# Patient Record
Sex: FEMALE | Race: WHITE | NOT HISPANIC OR LATINO | Employment: OTHER | URBAN - METROPOLITAN AREA
[De-identification: names, ages, dates, MRNs, and addresses within clinical notes are randomized per-mention and may not be internally consistent; named-entity substitution may affect disease eponyms.]

---

## 2023-03-17 LAB
CHOLESTEROL (MG/DL) IN SER/PLAS: 107 MG/DL (ref 0–199)
CHOLESTEROL IN HDL (MG/DL) IN SER/PLAS: 48.9 MG/DL
CHOLESTEROL/HDL RATIO: 2.2
LDL: 43 MG/DL (ref 0–99)
TRIGLYCERIDE (MG/DL) IN SER/PLAS: 75 MG/DL (ref 0–149)
VLDL: 15 MG/DL (ref 0–40)

## 2023-03-19 LAB
ESTIMATED AVERAGE GLUCOSE FOR HBA1C: 123 MG/DL
HEMOGLOBIN A1C/HEMOGLOBIN TOTAL IN BLOOD: 5.9 %

## 2023-04-25 LAB
ALANINE AMINOTRANSFERASE (SGPT) (U/L) IN SER/PLAS: 21 U/L (ref 7–45)
ALBUMIN (G/DL) IN SER/PLAS: 3.9 G/DL (ref 3.4–5)
ALKALINE PHOSPHATASE (U/L) IN SER/PLAS: 77 U/L (ref 33–136)
ANION GAP IN SER/PLAS: 9 MMOL/L (ref 10–20)
ASPARTATE AMINOTRANSFERASE (SGOT) (U/L) IN SER/PLAS: 24 U/L (ref 9–39)
BILIRUBIN TOTAL (MG/DL) IN SER/PLAS: 0.6 MG/DL (ref 0–1.2)
CALCIDIOL (25 OH VITAMIN D3) (NG/ML) IN SER/PLAS: 45 NG/ML
CALCIUM (MG/DL) IN SER/PLAS: 9.6 MG/DL (ref 8.6–10.3)
CARBON DIOXIDE, TOTAL (MMOL/L) IN SER/PLAS: 30 MMOL/L (ref 21–32)
CHLORIDE (MMOL/L) IN SER/PLAS: 106 MMOL/L (ref 98–107)
CREATININE (MG/DL) IN SER/PLAS: 0.81 MG/DL (ref 0.5–1.05)
ESTIMATED AVERAGE GLUCOSE FOR HBA1C: 114 MG/DL
GFR FEMALE: 75 ML/MIN/1.73M2
GLUCOSE (MG/DL) IN SER/PLAS: 88 MG/DL (ref 74–99)
HEMOGLOBIN A1C/HEMOGLOBIN TOTAL IN BLOOD: 5.6 %
POTASSIUM (MMOL/L) IN SER/PLAS: 4.3 MMOL/L (ref 3.5–5.3)
PROTEIN TOTAL: 6.9 G/DL (ref 6.4–8.2)
SODIUM (MMOL/L) IN SER/PLAS: 141 MMOL/L (ref 136–145)
THYROTROPIN (MIU/L) IN SER/PLAS BY DETECTION LIMIT <= 0.05 MIU/L: 1.59 MIU/L (ref 0.44–3.98)
UREA NITROGEN (MG/DL) IN SER/PLAS: 16 MG/DL (ref 6–23)

## 2023-09-06 PROBLEM — M19.90 ARTHRITIS: Status: ACTIVE | Noted: 2023-09-06

## 2023-09-06 PROBLEM — R25.2 LEG CRAMPS: Status: ACTIVE | Noted: 2023-09-06

## 2023-09-06 PROBLEM — R07.89 CHEST PAIN, ATYPICAL: Status: ACTIVE | Noted: 2023-09-06

## 2023-09-06 PROBLEM — R94.31 ABNORMAL EKG: Status: ACTIVE | Noted: 2023-09-06

## 2023-09-06 PROBLEM — M48.00 SPINAL STENOSIS: Status: ACTIVE | Noted: 2023-09-06

## 2023-09-06 PROBLEM — R53.83 FATIGUE: Status: ACTIVE | Noted: 2023-09-06

## 2023-09-06 PROBLEM — I25.10 CORONARY ARTERY DISEASE: Status: ACTIVE | Noted: 2023-09-06

## 2023-09-06 PROBLEM — I10 HYPERTENSION, ESSENTIAL, BENIGN: Status: ACTIVE | Noted: 2023-09-06

## 2023-09-06 PROBLEM — I20.9 ACUTE ANGINA (CMS-HCC): Status: ACTIVE | Noted: 2023-09-06

## 2023-09-06 PROBLEM — I48.0 PAROXYSMAL ATRIAL FIBRILLATION (MULTI): Status: ACTIVE | Noted: 2023-09-06

## 2023-09-06 PROBLEM — E66.3 OVERWEIGHT (BMI 25.0-29.9): Status: ACTIVE | Noted: 2023-09-06

## 2023-09-06 PROBLEM — R00.2 PALPITATIONS: Status: ACTIVE | Noted: 2023-09-06

## 2023-09-06 PROBLEM — E03.9 HYPOTHYROIDISM: Status: ACTIVE | Noted: 2023-09-06

## 2023-09-06 PROBLEM — R06.09 EXERTIONAL DYSPNEA: Status: ACTIVE | Noted: 2023-09-06

## 2023-09-06 PROBLEM — M85.80 OSTEOPENIA: Status: ACTIVE | Noted: 2023-09-06

## 2023-09-06 PROBLEM — H53.9 VISUAL CHANGES: Status: ACTIVE | Noted: 2023-09-06

## 2023-09-06 PROBLEM — Z95.1 S/P CABG X 4: Status: ACTIVE | Noted: 2023-09-06

## 2023-09-06 PROBLEM — H53.453: Status: ACTIVE | Noted: 2023-09-06

## 2023-09-06 PROBLEM — E78.5 HYPERLIPIDEMIA: Status: ACTIVE | Noted: 2023-09-06

## 2023-09-06 PROBLEM — M54.16 LUMBAR RADICULOPATHY, CHRONIC: Status: ACTIVE | Noted: 2023-09-06

## 2023-09-06 PROBLEM — I25.2: Status: ACTIVE | Noted: 2023-09-06

## 2023-09-06 RX ORDER — NITROFURANTOIN 25; 75 MG/1; MG/1
1 CAPSULE ORAL 2 TIMES DAILY
COMMUNITY
Start: 2020-12-30 | End: 2023-10-11 | Stop reason: WASHOUT

## 2023-09-06 RX ORDER — ALPRAZOLAM 0.25 MG/1
0.25 TABLET ORAL
COMMUNITY

## 2023-09-06 RX ORDER — PRIMIDONE 50 MG/1
0.5 TABLET ORAL DAILY
COMMUNITY
End: 2023-10-11 | Stop reason: WASHOUT

## 2023-09-06 RX ORDER — NITROGLYCERIN 0.3 MG/1
0.3 TABLET SUBLINGUAL
COMMUNITY

## 2023-09-06 RX ORDER — FAMOTIDINE 40 MG/1
1 TABLET, FILM COATED ORAL 2 TIMES DAILY
COMMUNITY
Start: 2019-10-16

## 2023-09-06 RX ORDER — CALCIUM CARBONATE 600 MG
2 TABLET ORAL DAILY
COMMUNITY
End: 2023-10-11 | Stop reason: WASHOUT

## 2023-09-06 RX ORDER — OLMESARTAN MEDOXOMIL 40 MG/1
1 TABLET ORAL DAILY
COMMUNITY
Start: 2020-02-26 | End: 2024-02-05 | Stop reason: SDUPTHER

## 2023-09-06 RX ORDER — NAPROXEN SODIUM 220 MG/1
81 TABLET, FILM COATED ORAL
COMMUNITY
Start: 2019-10-04

## 2023-09-06 RX ORDER — CELECOXIB 200 MG/1
200 CAPSULE ORAL DAILY PRN
COMMUNITY
Start: 2020-09-02 | End: 2023-10-11 | Stop reason: WASHOUT

## 2023-09-06 RX ORDER — ERGOCALCIFEROL 1.25 MG/1
50000 CAPSULE ORAL
COMMUNITY
Start: 2018-04-25

## 2023-09-06 RX ORDER — METOPROLOL SUCCINATE 50 MG/1
1.5 TABLET, EXTENDED RELEASE ORAL 2 TIMES DAILY
COMMUNITY
Start: 2021-03-11

## 2023-09-06 RX ORDER — ACETAMINOPHEN 325 MG/1
325 TABLET ORAL
COMMUNITY

## 2023-09-06 RX ORDER — VIT C/E/ZN/COPPR/LUTEIN/ZEAXAN 250MG-90MG
1 CAPSULE ORAL DAILY
COMMUNITY
End: 2023-10-11 | Stop reason: WASHOUT

## 2023-09-06 RX ORDER — LEVOTHYROXINE SODIUM 75 UG/1
75 TABLET ORAL
COMMUNITY
Start: 2018-05-02

## 2023-09-06 RX ORDER — TRIAMCINOLONE ACETONIDE 1 MG/G
1 OINTMENT TOPICAL 2 TIMES DAILY
COMMUNITY
Start: 2020-07-23

## 2023-09-06 RX ORDER — IRON POLYSACCHARIDE COMPLEX 150 MG
150 CAPSULE ORAL DAILY
COMMUNITY
Start: 2019-09-30 | End: 2023-10-11 | Stop reason: WASHOUT

## 2023-09-06 RX ORDER — EVOLOCUMAB 140 MG/ML
140 INJECTION, SOLUTION SUBCUTANEOUS
COMMUNITY
Start: 2019-10-16

## 2023-10-11 ENCOUNTER — OFFICE VISIT (OUTPATIENT)
Dept: CARDIOLOGY | Facility: CLINIC | Age: 78
End: 2023-10-11
Payer: MEDICARE

## 2023-10-11 VITALS
HEIGHT: 64 IN | BODY MASS INDEX: 29.02 KG/M2 | DIASTOLIC BLOOD PRESSURE: 64 MMHG | HEART RATE: 61 BPM | SYSTOLIC BLOOD PRESSURE: 138 MMHG | WEIGHT: 170 LBS

## 2023-10-11 DIAGNOSIS — I25.10 CORONARY ARTERY DISEASE INVOLVING NATIVE CORONARY ARTERY OF NATIVE HEART WITHOUT ANGINA PECTORIS: Primary | ICD-10-CM

## 2023-10-11 DIAGNOSIS — I10 HYPERTENSION, ESSENTIAL, BENIGN: ICD-10-CM

## 2023-10-11 DIAGNOSIS — E78.00 PURE HYPERCHOLESTEROLEMIA: ICD-10-CM

## 2023-10-11 DIAGNOSIS — Z95.1 S/P CABG X 4: ICD-10-CM

## 2023-10-11 PROCEDURE — 99214 OFFICE O/P EST MOD 30 MIN: CPT | Performed by: INTERNAL MEDICINE

## 2023-10-11 PROCEDURE — 1159F MED LIST DOCD IN RCRD: CPT | Performed by: INTERNAL MEDICINE

## 2023-10-11 PROCEDURE — 1125F AMNT PAIN NOTED PAIN PRSNT: CPT | Performed by: INTERNAL MEDICINE

## 2023-10-11 PROCEDURE — 3078F DIAST BP <80 MM HG: CPT | Performed by: INTERNAL MEDICINE

## 2023-10-11 PROCEDURE — 93000 ELECTROCARDIOGRAM COMPLETE: CPT | Performed by: INTERNAL MEDICINE

## 2023-10-11 PROCEDURE — 1160F RVW MEDS BY RX/DR IN RCRD: CPT | Performed by: INTERNAL MEDICINE

## 2023-10-11 PROCEDURE — 3075F SYST BP GE 130 - 139MM HG: CPT | Performed by: INTERNAL MEDICINE

## 2023-10-11 PROCEDURE — 1036F TOBACCO NON-USER: CPT | Performed by: INTERNAL MEDICINE

## 2023-10-11 RX ORDER — DOXAZOSIN 2 MG/1
2 TABLET ORAL NIGHTLY
COMMUNITY
Start: 2023-06-29

## 2023-10-11 NOTE — ASSESSMENT & PLAN NOTE
9/25/19 LIMA to LAD, SVG to diagonal, SVG sequential to PDA and PLB ([facility] at [facility])

## 2023-10-11 NOTE — PROGRESS NOTES
Transitory subxiphoid discomfort, got gout , No dyspnea,, no palapitions, no edema.      Review of Systems   All other systems reviewed and are negative.       Physical Exam  HENT:      Head: Normocephalic.   Eyes:      Pupils: Pupils are equal, round, and reactive to light.   Cardiovascular:      Rate and Rhythm: Normal rate and regular rhythm.   Abdominal:      Palpations: Abdomen is soft.   Musculoskeletal:      Right lower leg: No edema.      Left lower leg: No edema.   Neurological:      Mental Status: She is alert.          Problem List Items Addressed This Visit          Cardiac and Vasculature    Coronary artery disease - Primary    Hyperlipidemia    Hypertension, essential, benign    S/P CABG x 4    Current Assessment & Plan     9/25/19 LIMA to LAD, SVG to diagonal, SVG sequential to PDA and PLB ([facility] at [facility])

## 2023-10-24 ENCOUNTER — ANCILLARY PROCEDURE (OUTPATIENT)
Dept: RADIOLOGY | Facility: CLINIC | Age: 78
End: 2023-10-24
Payer: MEDICARE

## 2023-10-24 ENCOUNTER — LAB (OUTPATIENT)
Dept: LAB | Facility: LAB | Age: 78
End: 2023-10-24
Payer: MEDICARE

## 2023-10-24 DIAGNOSIS — I25.10 CORONARY ARTERY DISEASE INVOLVING NATIVE CORONARY ARTERY OF NATIVE HEART WITHOUT ANGINA PECTORIS: ICD-10-CM

## 2023-10-24 DIAGNOSIS — R69 ILLNESS, UNSPECIFIED: Primary | ICD-10-CM

## 2023-10-24 DIAGNOSIS — Z12.31 ENCOUNTER FOR SCREENING MAMMOGRAM FOR MALIGNANT NEOPLASM OF BREAST: ICD-10-CM

## 2023-10-24 DIAGNOSIS — E78.00 PURE HYPERCHOLESTEROLEMIA: ICD-10-CM

## 2023-10-24 LAB
ALBUMIN SERPL BCP-MCNC: 4.2 G/DL (ref 3.4–5)
ALP SERPL-CCNC: 78 U/L (ref 33–136)
ALT SERPL W P-5'-P-CCNC: 16 U/L (ref 7–45)
ANION GAP SERPL CALC-SCNC: 11 MMOL/L (ref 10–20)
AST SERPL W P-5'-P-CCNC: 19 U/L (ref 9–39)
BILIRUB SERPL-MCNC: 0.6 MG/DL (ref 0–1.2)
BUN SERPL-MCNC: 20 MG/DL (ref 6–23)
CALCIUM SERPL-MCNC: 10.1 MG/DL (ref 8.6–10.3)
CHLORIDE SERPL-SCNC: 104 MMOL/L (ref 98–107)
CHOLEST SERPL-MCNC: 148 MG/DL (ref 0–199)
CHOLESTEROL/HDL RATIO: 2.6
CO2 SERPL-SCNC: 28 MMOL/L (ref 21–32)
CREAT SERPL-MCNC: 0.95 MG/DL (ref 0.5–1.05)
GFR SERPL CREATININE-BSD FRML MDRD: 62 ML/MIN/1.73M*2
GLUCOSE SERPL-MCNC: 80 MG/DL (ref 74–99)
HDLC SERPL-MCNC: 57.7 MG/DL
LDLC SERPL CALC-MCNC: 68 MG/DL
NON HDL CHOLESTEROL: 90 MG/DL (ref 0–149)
POTASSIUM SERPL-SCNC: 4.4 MMOL/L (ref 3.5–5.3)
PROT SERPL-MCNC: 6.8 G/DL (ref 6.4–8.2)
SODIUM SERPL-SCNC: 139 MMOL/L (ref 136–145)
TRIGL SERPL-MCNC: 112 MG/DL (ref 0–149)
URATE SERPL-MCNC: 4.9 MG/DL (ref 2.3–6.7)
VLDL: 22 MG/DL (ref 0–40)

## 2023-10-24 PROCEDURE — 77067 SCR MAMMO BI INCL CAD: CPT | Performed by: RADIOLOGY

## 2023-10-24 PROCEDURE — 84550 ASSAY OF BLOOD/URIC ACID: CPT

## 2023-10-24 PROCEDURE — 77067 SCR MAMMO BI INCL CAD: CPT

## 2023-10-24 PROCEDURE — 36415 COLL VENOUS BLD VENIPUNCTURE: CPT

## 2023-10-24 PROCEDURE — 77063 BREAST TOMOSYNTHESIS BI: CPT | Performed by: RADIOLOGY

## 2023-10-24 PROCEDURE — 80061 LIPID PANEL: CPT

## 2023-10-24 PROCEDURE — 80053 COMPREHEN METABOLIC PANEL: CPT

## 2023-11-02 ENCOUNTER — ANCILLARY PROCEDURE (OUTPATIENT)
Dept: RADIOLOGY | Facility: CLINIC | Age: 78
End: 2023-11-02
Payer: MEDICARE

## 2023-11-02 ENCOUNTER — LAB (OUTPATIENT)
Dept: LAB | Facility: LAB | Age: 78
End: 2023-11-02
Payer: MEDICARE

## 2023-11-02 ENCOUNTER — APPOINTMENT (OUTPATIENT)
Dept: RADIOLOGY | Facility: CLINIC | Age: 78
End: 2023-11-02
Payer: MEDICARE

## 2023-11-02 DIAGNOSIS — E55.9 VITAMIN D DEFICIENCY, UNSPECIFIED: ICD-10-CM

## 2023-11-02 DIAGNOSIS — E04.2 NONTOXIC MULTINODULAR GOITER: ICD-10-CM

## 2023-11-02 DIAGNOSIS — E11.9 TYPE 2 DIABETES MELLITUS WITHOUT COMPLICATIONS (MULTI): ICD-10-CM

## 2023-11-02 DIAGNOSIS — E03.9 HYPOTHYROIDISM, UNSPECIFIED: Primary | ICD-10-CM

## 2023-11-02 DIAGNOSIS — R93.89 ABNORMAL FINDINGS ON DIAGNOSTIC IMAGING OF OTHER SPECIFIED BODY STRUCTURES: ICD-10-CM

## 2023-11-02 LAB
25(OH)D3 SERPL-MCNC: 35 NG/ML (ref 30–100)
ALBUMIN SERPL BCP-MCNC: 3.9 G/DL (ref 3.4–5)
ALP SERPL-CCNC: 69 U/L (ref 33–136)
ALT SERPL W P-5'-P-CCNC: 15 U/L (ref 7–45)
ANION GAP SERPL CALC-SCNC: 10 MMOL/L (ref 10–20)
AST SERPL W P-5'-P-CCNC: 21 U/L (ref 9–39)
BILIRUB SERPL-MCNC: 0.9 MG/DL (ref 0–1.2)
BUN SERPL-MCNC: 13 MG/DL (ref 6–23)
CALCIUM SERPL-MCNC: 9.4 MG/DL (ref 8.6–10.3)
CHLORIDE SERPL-SCNC: 105 MMOL/L (ref 98–107)
CO2 SERPL-SCNC: 30 MMOL/L (ref 21–32)
CREAT SERPL-MCNC: 0.75 MG/DL (ref 0.5–1.05)
CREAT UR-MCNC: 94 MG/DL (ref 20–320)
GFR SERPL CREATININE-BSD FRML MDRD: 83 ML/MIN/1.73M*2
GLUCOSE SERPL-MCNC: 78 MG/DL (ref 74–99)
MICROALBUMIN UR-MCNC: 9.4 MG/L
MICROALBUMIN/CREAT UR: 10 UG/MG CREAT
POTASSIUM SERPL-SCNC: 4.6 MMOL/L (ref 3.5–5.3)
PROT SERPL-MCNC: 6.5 G/DL (ref 6.4–8.2)
SODIUM SERPL-SCNC: 140 MMOL/L (ref 136–145)
T4 FREE SERPL-MCNC: 1.01 NG/DL (ref 0.61–1.12)
TSH SERPL-ACNC: 1.05 MIU/L (ref 0.44–3.98)

## 2023-11-02 PROCEDURE — 84443 ASSAY THYROID STIM HORMONE: CPT

## 2023-11-02 PROCEDURE — 83036 HEMOGLOBIN GLYCOSYLATED A1C: CPT

## 2023-11-02 PROCEDURE — 84439 ASSAY OF FREE THYROXINE: CPT

## 2023-11-02 PROCEDURE — 82043 UR ALBUMIN QUANTITATIVE: CPT

## 2023-11-02 PROCEDURE — 76536 US EXAM OF HEAD AND NECK: CPT | Mod: LIO | Performed by: RADIOLOGY

## 2023-11-02 PROCEDURE — 82306 VITAMIN D 25 HYDROXY: CPT

## 2023-11-02 PROCEDURE — 36415 COLL VENOUS BLD VENIPUNCTURE: CPT

## 2023-11-02 PROCEDURE — 82570 ASSAY OF URINE CREATININE: CPT

## 2023-11-02 PROCEDURE — 80053 COMPREHEN METABOLIC PANEL: CPT

## 2023-11-02 PROCEDURE — 76536 US EXAM OF HEAD AND NECK: CPT | Mod: LIO

## 2023-11-03 LAB
EST. AVERAGE GLUCOSE BLD GHB EST-MCNC: 126 MG/DL
HBA1C MFR BLD: 6 %

## 2024-01-10 ENCOUNTER — HOSPITAL ENCOUNTER (OUTPATIENT)
Dept: RADIOLOGY | Facility: HOSPITAL | Age: 79
Discharge: HOME | End: 2024-01-10
Payer: MEDICARE

## 2024-01-10 DIAGNOSIS — R52 PAIN: ICD-10-CM

## 2024-01-10 PROBLEM — N39.3 STRESS INCONTINENCE OF URINE: Status: ACTIVE | Noted: 2023-12-13

## 2024-01-10 PROBLEM — K21.9 GERD (GASTROESOPHAGEAL REFLUX DISEASE): Status: ACTIVE | Noted: 2019-03-11

## 2024-01-10 PROBLEM — I50.30 DIASTOLIC HF (HEART FAILURE) (MULTI): Status: ACTIVE | Noted: 2019-05-28

## 2024-01-10 PROCEDURE — 73560 X-RAY EXAM OF KNEE 1 OR 2: CPT | Mod: RT

## 2024-01-10 PROCEDURE — 73560 X-RAY EXAM OF KNEE 1 OR 2: CPT | Mod: RIGHT SIDE | Performed by: RADIOLOGY

## 2024-01-17 ENCOUNTER — APPOINTMENT (OUTPATIENT)
Dept: CARDIOLOGY | Facility: CLINIC | Age: 79
End: 2024-01-17
Payer: MEDICARE

## 2024-01-18 ENCOUNTER — OFFICE VISIT (OUTPATIENT)
Dept: CARDIOLOGY | Facility: CLINIC | Age: 79
End: 2024-01-18
Payer: MEDICARE

## 2024-01-18 VITALS
SYSTOLIC BLOOD PRESSURE: 150 MMHG | DIASTOLIC BLOOD PRESSURE: 90 MMHG | HEIGHT: 64 IN | BODY MASS INDEX: 28.27 KG/M2 | HEART RATE: 88 BPM | WEIGHT: 165.6 LBS

## 2024-01-18 DIAGNOSIS — Z95.1 S/P CABG X 4: Primary | ICD-10-CM

## 2024-01-18 DIAGNOSIS — E78.00 PURE HYPERCHOLESTEROLEMIA: ICD-10-CM

## 2024-01-18 DIAGNOSIS — E03.9 HYPOTHYROIDISM, UNSPECIFIED TYPE: ICD-10-CM

## 2024-01-18 DIAGNOSIS — R60.0 EDEMA OF RIGHT LOWER EXTREMITY: ICD-10-CM

## 2024-01-18 DIAGNOSIS — I50.32 CHRONIC DIASTOLIC HEART FAILURE (MULTI): ICD-10-CM

## 2024-01-18 DIAGNOSIS — I25.10 CORONARY ARTERY DISEASE INVOLVING NATIVE CORONARY ARTERY OF NATIVE HEART WITHOUT ANGINA PECTORIS: ICD-10-CM

## 2024-01-18 PROCEDURE — 1159F MED LIST DOCD IN RCRD: CPT | Performed by: INTERNAL MEDICINE

## 2024-01-18 PROCEDURE — 1036F TOBACCO NON-USER: CPT | Performed by: INTERNAL MEDICINE

## 2024-01-18 PROCEDURE — 1160F RVW MEDS BY RX/DR IN RCRD: CPT | Performed by: INTERNAL MEDICINE

## 2024-01-18 PROCEDURE — 99215 OFFICE O/P EST HI 40 MIN: CPT | Performed by: INTERNAL MEDICINE

## 2024-01-18 PROCEDURE — 1125F AMNT PAIN NOTED PAIN PRSNT: CPT | Performed by: INTERNAL MEDICINE

## 2024-01-18 PROCEDURE — 3080F DIAST BP >= 90 MM HG: CPT | Performed by: INTERNAL MEDICINE

## 2024-01-18 PROCEDURE — 93000 ELECTROCARDIOGRAM COMPLETE: CPT | Performed by: INTERNAL MEDICINE

## 2024-01-18 PROCEDURE — 3077F SYST BP >= 140 MM HG: CPT | Performed by: INTERNAL MEDICINE

## 2024-01-18 NOTE — ASSESSMENT & PLAN NOTE
9/25/19 LIMA to Lad, SVG to diag., SVG sequential to PDA and PLB ([facility]) at [facility]

## 2024-01-18 NOTE — PROGRESS NOTES
"  Subjective  Kristin Martines  is a 78 y.o. year old female who presents for ASHD.  No chest pain, no palapitions, no dyspnea, notes pain in rightn knee from hmopving her knee cap out of place    Blood pressure 150/90, pulse 88, height 1.626 m (5' 4\"), weight 75.1 kg (165 lb 9.6 oz).   Amoxicillin-pot clavulanate, Cephalexin, Ciprofloxacin, Codeine, Erythromycin base, Esomeprazole magnesium, Ezetimibe, Oxycodone-acetaminophen, and Sulfamethoxazole-trimethoprim  Past Medical History:   Diagnosis Date    Encounter for screening for malignant neoplasm of vagina     Vaginal Pap smear    Other specified noninflammatory disorders of vulva and perineum     Vulval lesion    Personal history of other medical treatment 11/01/2019    H/O bone density study    Personal history of other medical treatment     H/O mammogram     Past Surgical History:   Procedure Laterality Date    FOOT SURGERY  12/02/2019    Foot Surgery    OTHER SURGICAL HISTORY  08/09/2021    Cardiac catheterization    OTHER SURGICAL HISTORY  04/29/2020    Coronary artery bypass graft    OTHER SURGICAL HISTORY  12/02/2019    Wrist Surgery    OTHER SURGICAL HISTORY  12/02/2019    Biopsy Vulvar     Family History   Problem Relation Name Age of Onset    Hypertension Mother      Heart attack Father      Asthma Other family hx     Other (cardiac disorder) Other family hx     Hypertension Other family hx      @SOC    Current Outpatient Medications   Medication Sig Dispense Refill    acetaminophen (Tylenol) 325 mg tablet Take 1 tablet (325 mg) by mouth.      ALPRAZolam (Xanax) 0.25 mg tablet Take 1 tablet (0.25 mg) by mouth.      aspirin 81 mg chewable tablet Chew 1 tablet (81 mg).      doxazosin (Cardura) 2 mg tablet Take 1 tablet (2 mg) by mouth once daily at bedtime.      ergocalciferol (Vitamin D-2) 1.25 MG (67843 UT) capsule Take 1 capsule (50,000 Units) by mouth.      evolocumab (Repatha SureClick) 140 mg/mL injection Inject 1 mL (140 mg) under the skin every 14 " (fourteen) days.      famotidine (Pepcid) 40 mg tablet Take 1 tablet (40 mg) by mouth 2 times a day.      levothyroxine (Synthroid, Levoxyl) 75 mcg tablet Take 1 tablet (75 mcg) by mouth.      metoprolol succinate XL (Toprol-XL) 50 mg 24 hr tablet Take 1.5 tablets (75 mg) by mouth twice a day.      multivitamin (MULTIPLE VITAMINS ORAL) Take 1 tablet by mouth once daily.      nitroglycerin (Nitrostat) 0.3 mg SL tablet Place 1 tablet (0.3 mg) under the tongue.      olmesartan (BENIcar) 40 mg tablet Take 1 tablet (40 mg) by mouth once daily.      triamcinolone (Kenalog) 0.1 % ointment Apply 1 Application topically twice a day. Apply sparingly and rub in well to affected area(s) twice daily. Not for face, breast or groin.       No current facility-administered medications for this visit.        ROS  Review of Systems   All other systems reviewed and are negative.      Physical Exam  Physical Exam  Constitutional:       Appearance: Normal appearance.   HENT:      Head: Normocephalic and atraumatic.   Eyes:      Extraocular Movements: Extraocular movements intact.      Pupils: Pupils are equal, round, and reactive to light.   Cardiovascular:      Heart sounds: S1 normal and S2 normal.   Pulmonary:      Effort: Pulmonary effort is normal.      Breath sounds: Normal breath sounds.   Abdominal:      Palpations: Abdomen is soft.   Musculoskeletal:      Right lower leg: No edema.      Left lower leg: No edema.      Comments: Slight edema of the right lower thigh   Skin:     General: Skin is warm and dry.   Neurological:      Mental Status: She is alert and oriented to person, place, and time.   Psychiatric:         Mood and Affect: Mood normal.         Behavior: Behavior normal.          EKG  Encounter Date: 10/11/23   ECG 12 Lead    Narrative    NSR at 61/min., WNL       Problem List Items Addressed This Visit       Coronary artery disease    Hyperlipidemia     10/24/23 Tchol = 148, HDL = 57.7, LDL = 68, VLDL = 22, Trig  =112         Hypothyroidism    S/P CABG x 4 - Primary     9/25/19 TOMPKINS to Lad, SVG to diag., SVG sequential to PDA and PLB ([facility]) at [facility]         Relevant Orders    ECG 12 Lead    Diastolic HF (heart failure) (CMS/HCC)     Other Visit Diagnoses       Edema of right lower extremity        Relevant Orders    Vascular US lower extremity venous duplex bilateral              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

## 2024-01-25 ENCOUNTER — HOSPITAL ENCOUNTER (OUTPATIENT)
Dept: RADIOLOGY | Facility: CLINIC | Age: 79
Discharge: HOME | End: 2024-01-25
Payer: MEDICARE

## 2024-01-25 ENCOUNTER — HOSPITAL ENCOUNTER (OUTPATIENT)
Dept: VASCULAR MEDICINE | Facility: HOSPITAL | Age: 79
Discharge: HOME | End: 2024-01-25
Payer: MEDICARE

## 2024-01-25 DIAGNOSIS — R52 PAIN: ICD-10-CM

## 2024-01-25 DIAGNOSIS — R60.9 SWELLING: ICD-10-CM

## 2024-01-25 DIAGNOSIS — R60.0 EDEMA OF RIGHT LOWER EXTREMITY: ICD-10-CM

## 2024-01-25 PROCEDURE — 73721 MRI JNT OF LWR EXTRE W/O DYE: CPT | Mod: RT

## 2024-01-25 PROCEDURE — 93970 EXTREMITY STUDY: CPT

## 2024-01-25 PROCEDURE — 93970 EXTREMITY STUDY: CPT | Performed by: INTERNAL MEDICINE

## 2024-01-25 PROCEDURE — 73721 MRI JNT OF LWR EXTRE W/O DYE: CPT | Mod: RIGHT SIDE | Performed by: RADIOLOGY

## 2024-01-30 ENCOUNTER — APPOINTMENT (OUTPATIENT)
Dept: VASCULAR MEDICINE | Facility: CLINIC | Age: 79
End: 2024-01-30
Payer: MEDICARE

## 2024-02-05 DIAGNOSIS — I10 HYPERTENSION, ESSENTIAL, BENIGN: Primary | ICD-10-CM

## 2024-02-05 RX ORDER — OLMESARTAN MEDOXOMIL 40 MG/1
40 TABLET ORAL DAILY
Qty: 90 TABLET | Refills: 3 | Status: SHIPPED | OUTPATIENT
Start: 2024-02-05

## 2024-04-16 ENCOUNTER — OFFICE VISIT (OUTPATIENT)
Dept: CARDIOLOGY | Facility: CLINIC | Age: 79
End: 2024-04-16
Payer: MEDICARE

## 2024-04-16 ENCOUNTER — APPOINTMENT (OUTPATIENT)
Dept: AUDIOLOGY | Facility: CLINIC | Age: 79
End: 2024-04-16
Payer: MEDICARE

## 2024-04-16 ENCOUNTER — APPOINTMENT (OUTPATIENT)
Dept: OTOLARYNGOLOGY | Facility: CLINIC | Age: 79
End: 2024-04-16
Payer: MEDICARE

## 2024-04-16 VITALS
SYSTOLIC BLOOD PRESSURE: 150 MMHG | OXYGEN SATURATION: 95 % | HEIGHT: 64 IN | BODY MASS INDEX: 29.37 KG/M2 | WEIGHT: 172 LBS | DIASTOLIC BLOOD PRESSURE: 88 MMHG | HEART RATE: 77 BPM

## 2024-04-16 DIAGNOSIS — M71.21 SYNOVIAL CYST OF RIGHT POPLITEAL SPACE: ICD-10-CM

## 2024-04-16 DIAGNOSIS — I10 HYPERTENSION, ESSENTIAL, BENIGN: ICD-10-CM

## 2024-04-16 DIAGNOSIS — E78.00 PURE HYPERCHOLESTEROLEMIA: ICD-10-CM

## 2024-04-16 DIAGNOSIS — I50.32 CHRONIC DIASTOLIC HEART FAILURE (MULTI): ICD-10-CM

## 2024-04-16 DIAGNOSIS — I25.10 CORONARY ARTERY DISEASE INVOLVING NATIVE CORONARY ARTERY OF NATIVE HEART WITHOUT ANGINA PECTORIS: ICD-10-CM

## 2024-04-16 DIAGNOSIS — Z95.1 S/P CABG X 4: ICD-10-CM

## 2024-04-16 DIAGNOSIS — R60.0 EDEMA OF RIGHT LOWER EXTREMITY: Primary | ICD-10-CM

## 2024-04-16 DIAGNOSIS — K21.9 GASTROESOPHAGEAL REFLUX DISEASE WITHOUT ESOPHAGITIS: ICD-10-CM

## 2024-04-16 PROCEDURE — 99214 OFFICE O/P EST MOD 30 MIN: CPT | Performed by: INTERNAL MEDICINE

## 2024-04-16 PROCEDURE — 93000 ELECTROCARDIOGRAM COMPLETE: CPT | Performed by: INTERNAL MEDICINE

## 2024-04-16 PROCEDURE — 1160F RVW MEDS BY RX/DR IN RCRD: CPT | Performed by: INTERNAL MEDICINE

## 2024-04-16 PROCEDURE — 3079F DIAST BP 80-89 MM HG: CPT | Performed by: INTERNAL MEDICINE

## 2024-04-16 PROCEDURE — 3077F SYST BP >= 140 MM HG: CPT | Performed by: INTERNAL MEDICINE

## 2024-04-16 PROCEDURE — 1159F MED LIST DOCD IN RCRD: CPT | Performed by: INTERNAL MEDICINE

## 2024-04-16 RX ORDER — SERTRALINE HYDROCHLORIDE 25 MG/1
50 TABLET, FILM COATED ORAL DAILY
COMMUNITY

## 2024-04-16 NOTE — PROGRESS NOTES
"  Subjective  Kristin Martines  is a 78 y.o. year old female who presents for F/U ASHd and bilateral LE venous duplex.  Occ. Palpitatins when she takes more caffeine no chest pain, occ. Slight dyspnea with damp weather    Blood pressure 150/88, pulse 77, height 1.626 m (5' 4\"), weight 78 kg (172 lb), SpO2 95%.   Amoxicillin-pot clavulanate, Cephalexin, Ciprofloxacin, Codeine, Erythromycin base, Esomeprazole magnesium, Ezetimibe, Oxycodone-acetaminophen, and Sulfamethoxazole-trimethoprim  Past Medical History:   Diagnosis Date    Encounter for screening for malignant neoplasm of vagina     Vaginal Pap smear    Other specified noninflammatory disorders of vulva and perineum     Vulval lesion    Personal history of other medical treatment 11/01/2019    H/O bone density study    Personal history of other medical treatment     H/O mammogram     Past Surgical History:   Procedure Laterality Date    FOOT SURGERY  12/02/2019    Foot Surgery    OTHER SURGICAL HISTORY  08/09/2021    Cardiac catheterization    OTHER SURGICAL HISTORY  04/29/2020    Coronary artery bypass graft    OTHER SURGICAL HISTORY  12/02/2019    Wrist Surgery    OTHER SURGICAL HISTORY  12/02/2019    Biopsy Vulvar     Family History   Problem Relation Name Age of Onset    Hypertension Mother      Heart attack Father      Asthma Other family hx     Other (cardiac disorder) Other family hx     Hypertension Other family hx      @SOC    Current Outpatient Medications   Medication Sig Dispense Refill    acetaminophen (Tylenol) 325 mg tablet Take 1 tablet (325 mg) by mouth.      ALPRAZolam (Xanax) 0.25 mg tablet Take 1 tablet (0.25 mg) by mouth.      aspirin 81 mg chewable tablet Chew 1 tablet (81 mg).      doxazosin (Cardura) 2 mg tablet Take 1 tablet (2 mg) by mouth once daily at bedtime.      ergocalciferol (Vitamin D-2) 1.25 MG (82878 UT) capsule Take 1 capsule (50,000 Units) by mouth.      evolocumab (Repatha SureClick) 140 mg/mL injection Inject 1 mL (140 " mg) under the skin every 14 (fourteen) days.      famotidine (Pepcid) 40 mg tablet Take 1 tablet (40 mg) by mouth 2 times a day.      levothyroxine (Synthroid, Levoxyl) 75 mcg tablet Take 1 tablet (75 mcg) by mouth.      metoprolol succinate XL (Toprol-XL) 50 mg 24 hr tablet Take 1.5 tablets (75 mg) by mouth twice a day.      multivitamin (MULTIPLE VITAMINS ORAL) Take 1 tablet by mouth once daily.      nitroglycerin (Nitrostat) 0.3 mg SL tablet Place 1 tablet (0.3 mg) under the tongue.      olmesartan (BENIcar) 40 mg tablet Take 1 tablet (40 mg) by mouth once daily. 90 tablet 3    sertraline (Zoloft) 25 mg tablet Take 2 tablets (50 mg) by mouth once daily.      triamcinolone (Kenalog) 0.1 % ointment Apply 1 Application topically twice a day. Apply sparingly and rub in well to affected area(s) twice daily. Not for face, breast or groin.       No current facility-administered medications for this visit.        ROS  Review of Systems   All other systems reviewed and are negative.      Physical Exam  Physical Exam  Constitutional:       Appearance: Normal appearance.   HENT:      Head: Normocephalic and atraumatic.   Cardiovascular:      Rate and Rhythm: Normal rate and regular rhythm.      Heart sounds: S1 normal and S2 normal.   Pulmonary:      Effort: Pulmonary effort is normal.      Breath sounds: Normal breath sounds.   Musculoskeletal:      Right lower leg: No edema.      Left lower leg: No edema.      Comments: Right sided Baker's cyst   Skin:     General: Skin is warm and dry.   Neurological:      General: No focal deficit present.      Mental Status: She is alert and oriented to person, place, and time.   Psychiatric:         Mood and Affect: Mood normal.         Behavior: Behavior normal.          EKG  Encounter Date: 04/16/24   ECG 12 Lead    Narrative    NSR at 72/min., possible old IWMI       Problem List Items Addressed This Visit       Coronary artery disease    Relevant Orders    Follow Up In Cardiology     Hyperlipidemia    Relevant Orders    Lipid Panel Non-Fasting    Comprehensive metabolic panel    Follow Up In Cardiology    Hypertension, essential, benign    S/P CABG x 4     9/25/19 LIMA to LAD, SVG to Daig,SVG sequential to PDA and PLB ([facility]) at [email or web address]         GERD (gastroesophageal reflux disease)    Diastolic HF (heart failure) (Multi)    Synovial cyst of right popliteal space     Noted on 1/15/24 venous duplex.  Refer to dr. Robert          Other Visit Diagnoses       Edema of right lower extremity    -  Primary    Relevant Orders    ECG 12 Lead (Completed)    Follow Up In Cardiology    Lipid Panel Non-Fasting            CMP. Lipid profile  Refer to Dr Robert re: right Doe's cyst  Return 3 months with EKG      Juan Manuel Castro MD

## 2024-04-17 ENCOUNTER — APPOINTMENT (OUTPATIENT)
Dept: CARDIOLOGY | Facility: CLINIC | Age: 79
End: 2024-04-17
Payer: MEDICARE

## 2024-04-19 NOTE — ASSESSMENT & PLAN NOTE
9/25/19 TOMPKINS to LAD, SVG to Daig,SVG sequential to PDA and PLB ([facility]) at [facility]

## 2024-05-02 ENCOUNTER — LAB (OUTPATIENT)
Dept: LAB | Facility: LAB | Age: 79
End: 2024-05-02
Payer: MEDICARE

## 2024-05-02 DIAGNOSIS — E55.9 VITAMIN D DEFICIENCY, UNSPECIFIED: ICD-10-CM

## 2024-05-02 DIAGNOSIS — E78.5 HYPERLIPIDEMIA, UNSPECIFIED: Primary | ICD-10-CM

## 2024-05-02 DIAGNOSIS — E11.9 TYPE 2 DIABETES MELLITUS WITHOUT COMPLICATIONS (MULTI): ICD-10-CM

## 2024-05-02 DIAGNOSIS — R60.0 EDEMA OF RIGHT LOWER EXTREMITY: ICD-10-CM

## 2024-05-02 DIAGNOSIS — E78.00 PURE HYPERCHOLESTEROLEMIA: ICD-10-CM

## 2024-05-02 LAB
25(OH)D3 SERPL-MCNC: 53 NG/ML (ref 30–100)
ALBUMIN SERPL BCP-MCNC: 4 G/DL (ref 3.4–5)
ALP SERPL-CCNC: 70 U/L (ref 33–136)
ALT SERPL W P-5'-P-CCNC: 12 U/L (ref 7–45)
ANION GAP SERPL CALC-SCNC: 9 MMOL/L (ref 10–20)
AST SERPL W P-5'-P-CCNC: 19 U/L (ref 9–39)
BILIRUB SERPL-MCNC: 0.7 MG/DL (ref 0–1.2)
BUN SERPL-MCNC: 19 MG/DL (ref 6–23)
CALCIUM SERPL-MCNC: 9.7 MG/DL (ref 8.6–10.3)
CHLORIDE SERPL-SCNC: 107 MMOL/L (ref 98–107)
CHOLEST SERPL-MCNC: 114 MG/DL (ref 0–199)
CHOLESTEROL/HDL RATIO: 2.3
CO2 SERPL-SCNC: 29 MMOL/L (ref 21–32)
CREAT SERPL-MCNC: 0.78 MG/DL (ref 0.5–1.05)
EGFRCR SERPLBLD CKD-EPI 2021: 78 ML/MIN/1.73M*2
EST. AVERAGE GLUCOSE BLD GHB EST-MCNC: 105 MG/DL
GLUCOSE SERPL-MCNC: 90 MG/DL (ref 74–99)
HBA1C MFR BLD: 5.3 %
HDLC SERPL-MCNC: 49.8 MG/DL
NON-HDL CHOLESTEROL: 64 MG/DL (ref 0–149)
POTASSIUM SERPL-SCNC: 4.2 MMOL/L (ref 3.5–5.3)
PROT SERPL-MCNC: 6.7 G/DL (ref 6.4–8.2)
SODIUM SERPL-SCNC: 141 MMOL/L (ref 136–145)
T4 FREE SERPL-MCNC: 1.15 NG/DL (ref 0.61–1.12)
TSH SERPL-ACNC: 0.91 MIU/L (ref 0.44–3.98)

## 2024-05-02 PROCEDURE — 84443 ASSAY THYROID STIM HORMONE: CPT

## 2024-05-02 PROCEDURE — 83036 HEMOGLOBIN GLYCOSYLATED A1C: CPT

## 2024-05-02 PROCEDURE — 84439 ASSAY OF FREE THYROXINE: CPT

## 2024-05-02 PROCEDURE — 80053 COMPREHEN METABOLIC PANEL: CPT

## 2024-05-02 PROCEDURE — 36415 COLL VENOUS BLD VENIPUNCTURE: CPT

## 2024-05-02 PROCEDURE — 82465 ASSAY BLD/SERUM CHOLESTEROL: CPT

## 2024-05-02 PROCEDURE — 83718 ASSAY OF LIPOPROTEIN: CPT

## 2024-05-02 PROCEDURE — 82306 VITAMIN D 25 HYDROXY: CPT

## 2024-05-30 ENCOUNTER — CLINICAL SUPPORT (OUTPATIENT)
Dept: AUDIOLOGY | Facility: CLINIC | Age: 79
End: 2024-05-30
Payer: MEDICARE

## 2024-05-30 ENCOUNTER — OFFICE VISIT (OUTPATIENT)
Dept: OTOLARYNGOLOGY | Facility: CLINIC | Age: 79
End: 2024-05-30
Payer: MEDICARE

## 2024-05-30 VITALS
OXYGEN SATURATION: 99 % | BODY MASS INDEX: 29.02 KG/M2 | SYSTOLIC BLOOD PRESSURE: 182 MMHG | DIASTOLIC BLOOD PRESSURE: 84 MMHG | HEART RATE: 62 BPM | TEMPERATURE: 98.4 F | HEIGHT: 64 IN | RESPIRATION RATE: 16 BRPM | WEIGHT: 170 LBS

## 2024-05-30 DIAGNOSIS — H90.3 SENSORINEURAL HEARING LOSS (SNHL), BILATERAL: ICD-10-CM

## 2024-05-30 DIAGNOSIS — H90.3 BILATERAL SENSORINEURAL HEARING LOSS: Primary | ICD-10-CM

## 2024-05-30 DIAGNOSIS — H93.13 TINNITUS OF BOTH EARS: Primary | ICD-10-CM

## 2024-05-30 PROCEDURE — 92550 TYMPANOMETRY & REFLEX THRESH: CPT | Performed by: AUDIOLOGIST

## 2024-05-30 PROCEDURE — 92557 COMPREHENSIVE HEARING TEST: CPT | Performed by: AUDIOLOGIST

## 2024-05-30 PROCEDURE — 99213 OFFICE O/P EST LOW 20 MIN: CPT | Performed by: STUDENT IN AN ORGANIZED HEALTH CARE EDUCATION/TRAINING PROGRAM

## 2024-05-30 PROCEDURE — 1159F MED LIST DOCD IN RCRD: CPT | Performed by: STUDENT IN AN ORGANIZED HEALTH CARE EDUCATION/TRAINING PROGRAM

## 2024-05-30 PROCEDURE — 1126F AMNT PAIN NOTED NONE PRSNT: CPT | Performed by: STUDENT IN AN ORGANIZED HEALTH CARE EDUCATION/TRAINING PROGRAM

## 2024-05-30 PROCEDURE — 1160F RVW MEDS BY RX/DR IN RCRD: CPT | Performed by: STUDENT IN AN ORGANIZED HEALTH CARE EDUCATION/TRAINING PROGRAM

## 2024-05-30 PROCEDURE — 1036F TOBACCO NON-USER: CPT | Performed by: STUDENT IN AN ORGANIZED HEALTH CARE EDUCATION/TRAINING PROGRAM

## 2024-05-30 PROCEDURE — 3079F DIAST BP 80-89 MM HG: CPT | Performed by: STUDENT IN AN ORGANIZED HEALTH CARE EDUCATION/TRAINING PROGRAM

## 2024-05-30 PROCEDURE — 3077F SYST BP >= 140 MM HG: CPT | Performed by: STUDENT IN AN ORGANIZED HEALTH CARE EDUCATION/TRAINING PROGRAM

## 2024-05-30 RX ORDER — CYCLOSPORINE 0.5 MG/ML
1 EMULSION OPHTHALMIC
COMMUNITY

## 2024-05-30 ASSESSMENT — COLUMBIA-SUICIDE SEVERITY RATING SCALE - C-SSRS
2. HAVE YOU ACTUALLY HAD ANY THOUGHTS OF KILLING YOURSELF?: NO
1. IN THE PAST MONTH, HAVE YOU WISHED YOU WERE DEAD OR WISHED YOU COULD GO TO SLEEP AND NOT WAKE UP?: NO
6. HAVE YOU EVER DONE ANYTHING, STARTED TO DO ANYTHING, OR PREPARED TO DO ANYTHING TO END YOUR LIFE?: NO

## 2024-05-30 ASSESSMENT — PAIN SCALES - GENERAL
PAINLEVEL_OUTOF10: 0 - NO PAIN
PAINLEVEL: 0-NO PAIN

## 2024-05-30 ASSESSMENT — PATIENT HEALTH QUESTIONNAIRE - PHQ9
SUM OF ALL RESPONSES TO PHQ9 QUESTIONS 1 AND 2: 0
2. FEELING DOWN, DEPRESSED OR HOPELESS: NOT AT ALL
1. LITTLE INTEREST OR PLEASURE IN DOING THINGS: NOT AT ALL

## 2024-05-30 ASSESSMENT — ENCOUNTER SYMPTOMS
DEPRESSION: 0
LOSS OF SENSATION IN FEET: 0
OCCASIONAL FEELINGS OF UNSTEADINESS: 0

## 2024-05-30 ASSESSMENT — PAIN - FUNCTIONAL ASSESSMENT: PAIN_FUNCTIONAL_ASSESSMENT: 0-10

## 2024-05-30 NOTE — PROGRESS NOTES
"SUBJECTIVE  Patient ID: Kristin Martines is a 78 y.o. female who presents for Follow-up (Hearing loss).    History:  77 year-old female referred by Dr. Jemal Helton for evaluation of hearing changes and tinnitus.     She reports that 4-5 months ago she yelled at the dog and chadd her hearing changed. She has noted bilateral tinnitus that is a constant \"noise;\" not pulsatile. She has some left otalgia feels that may be related to grinding. She was concerned that her hearing change may be related to cerumen impaction. She denies otorrhea, dizziness (rare). She denies a history of prior ear surgery, noise exposure, exposure to ototoxic drugs or agents. Her mother had hearing loss.     She is also noting a lesion on the left side of her septum that grows a scab. This will occasionally bleed. Seems to grow and then will come off. Has been an issue for years. She is non-smoker.     Update 5/30/2024:  Feels that hearing has been relatively stable or potentially even improved.  Still having difficulty with some communication, especially at work.  Interested in possible hearing aids.  Here today with new audiogram.    OBJECTIVE  Physical Exam  CONSTITUTIONAL: Well appearing female who appears stated age.  PSYCHIATRIC: Alert, appropriate mood and affect.  RESPIRATORY: Normal inspiration and expiration and chest wall expansion; no use of accessory muscles to breathe.  VOICE: Clear speech without hoarseness. No stridor nor stertor.  HEAD AND FACE: Symmetric facial features. No cutaneous masses or lesions were visualized.  RIGHT EAR:  Normal external ear and post auricular area, no visible lesions, external auditory canal patent, tympanic membrane intact, no retraction, no signs of mass, effusion, or infection within the middle ear.  LEFT EAR: Normal external ear and post auricular area, no visible lesions, external auditory canal patent, tympanic membrane intact, no retraction, no signs of mass, effusion, or infection within " the middle ear.    --------------------------------------------------  Audiology:  I personally reviewed the patient's audiogram from today.  This demonstrated a relatively stable, symmetric normal sloping to moderate-severe sensorineural hearing loss bilaterally.  She had good word recognition scores, type A tympanograms, and some preservation of acoustic reflexes bilaterally.   --------------------------------------------------    ASSESSMENT/PLAN  Diagnoses and all orders for this visit:  Bilateral sensorineural hearing loss      78 y.o. female who initially presented with tinnitus in the setting of hearing loss.    1. Tinnitus, bilateral sensorineural hearing loss  The etiology of tinnitus and its connection to hearing loss was previously discussed with the patient. The patient presents today with an audiogram that demonstrates a stable normal sloping to moderate-severe sensorineural hearing loss.  The patient is more interested in amplification at this time and has requested that I sign for hearing aids.  Insurance form completed and given back to the patient.    We discussed that the patient could also consider over-the-counter hearing aids if she is not interested in adjustable hearing aids from an audiologist.  I recommended follow-up audiograms every 1 to 2 years.  I recommended she preserve her current hearing.     She can see me as needed.    This note was created using speech recognition transcription software. Despite proofreading, typographical errors may be present that affect the meaning of the content. Please contact my office with any questions.

## 2024-05-30 NOTE — PROGRESS NOTES
"AUDIOLOGY ADULT AUDIOMETRIC EVALUATION      Name:  Kristin Martines  :  1945  Age:  78 y.o.  Date of Evaluation:  2024    HISTORY  Reason for visit:  hearing loss; tinnitus  Ms. Martines is seen 24 at the request of Destin Chua M.D. for an evaluation of hearing.      Chief complaint:    Hearing loss    Hearing loss:   bilateral; increased hearing difficulty since previous audiogram of 2023  Tinnitus:   intermittent ringing tinnitus; constant hiss-like tinnitus bilaterally   Otitis Media: denies  Otologic surgical history:  denies  Dizziness/imbalance:  denies  Otalgia:  temporomandibular joint disorder, pain near left ear; 0/10 today  Ear pressure/fullness:  denies  History of excessive noise exposure:  denies   Other: none    Hearing aid history: currently pursuing hearing aids         EVALUATION  Please find audiogram in \"Media\" tab (Document Type:  Audiology Report) or included at the bottom of this note.    RESULTS   Otoscopic Evaluation: clear canals bilaterally       Immittance Measures (226 Hz probe tone):   Tympanometry is consistent with normal middle ear pressure and normal tympanic membrane mobility bilaterally.       Ipsilateral acoustic reflexes (500-4000 Hz) are present for the right ear for 500-1000 Hz (absent 7609-3208 Hz) and present for the left ear for 500-2000 Hz (absent 4000 Hz).       Test technique:  standard behavioral technique via insert earphones.  Reliability is good.    Pure Tone Audiometry:  Hearing sensitivity is in the normal hearing to moderately-severe hearing loss range bilaterally.       Speech Audiometry:        Right Ear:  Speech Reception Threshold (SRT) was obtained at 40 dBHL                 Speech discrimination score was 92% in quiet when words were presented at 80 dBHL      Left Ear:  Speech Reception Threshold (SRT) was obtained at 45 dBHL                 Speech discrimination score was 88% in quiet when words were presented at 85 " dBHL    IMPRESSIONS:  In comparison with previous audiogram of 4/18/2023, hearing is stable bilaterally.      Patient is expected to experience communication difficulty in many situations.    Patient is expected to benefit from devices that provide amplification (e.g., hearing aids) and improve the desired sound signal over that of background noise.       RECOMMENDATIONS  Continue with ENT follow-up with Destin Chua M.D.   Continue with hearing aid evaluation.    Reassess hearing in 1 year (or sooner if medically indicated or if there is a concern for a change in hearing).    Continue with medical follow-up as indicated.       PATIENT EDUCATION  Discussed results and recommendations with patient.  Questions were addressed and the patient was encouraged to contact our department should concerns arise.       MARGARETH Pacheco, CCC-A  Licensed Audiologist

## 2024-07-08 PROBLEM — J34.89 NASAL MUCOSA DRY: Status: ACTIVE | Noted: 2024-07-08

## 2024-07-08 PROBLEM — Z86.39 HISTORY OF HYPERCHOLESTEROLEMIA: Status: ACTIVE | Noted: 2024-07-08

## 2024-07-08 PROBLEM — N90.89 LESION OF VULVA: Status: ACTIVE | Noted: 2024-07-08

## 2024-07-08 PROBLEM — Z86.39 HISTORY OF HYPOTHYROIDISM: Status: ACTIVE | Noted: 2024-07-08

## 2024-07-08 PROBLEM — T84.099A MECHANICAL COMPLICATION OF INTERNAL JOINT PROSTHESIS (CMS-HCC): Status: ACTIVE | Noted: 2024-07-08

## 2024-07-08 PROBLEM — Z86.69 HISTORY OF MIGRAINE: Status: ACTIVE | Noted: 2024-07-08

## 2024-07-08 PROBLEM — M79.673 PAIN OF FOOT: Status: ACTIVE | Noted: 2024-07-08

## 2024-07-08 PROBLEM — M20.40 HAMMER TOE: Status: ACTIVE | Noted: 2024-07-08

## 2024-07-08 PROBLEM — N39.0 ACUTE URINARY TRACT INFECTION: Status: ACTIVE | Noted: 2024-07-08

## 2024-07-08 PROBLEM — S86.919A STRAIN OF KNEE: Status: ACTIVE | Noted: 2024-02-10

## 2024-07-08 PROBLEM — M25.561 RIGHT KNEE PAIN: Status: ACTIVE | Noted: 2024-02-28

## 2024-07-08 PROBLEM — Z86.69 HISTORY OF HEARING LOSS: Status: ACTIVE | Noted: 2024-07-08

## 2024-07-08 PROBLEM — H93.13 SUBJECTIVE TINNITUS OF BOTH EARS: Status: ACTIVE | Noted: 2024-07-08

## 2024-07-08 PROBLEM — Z86.39 HISTORY OF HYPERCHOLESTEROLEMIA: Status: RESOLVED | Noted: 2024-07-08 | Resolved: 2024-07-08

## 2024-07-08 PROBLEM — R60.0 EDEMA OF RIGHT LOWER EXTREMITY: Status: ACTIVE | Noted: 2024-07-08

## 2024-07-08 PROBLEM — J34.89 NASAL CRUSTING: Status: ACTIVE | Noted: 2024-07-08

## 2024-07-08 PROBLEM — Z86.39 HISTORY OF THYROID DISORDER: Status: ACTIVE | Noted: 2024-07-08

## 2024-07-08 PROBLEM — Z86.59 HISTORY OF DEPRESSION: Status: ACTIVE | Noted: 2024-07-08

## 2024-07-08 PROBLEM — J34.2 DEVIATED NASAL SEPTUM: Status: ACTIVE | Noted: 2024-07-08

## 2024-07-12 DIAGNOSIS — I10 HYPERTENSION, ESSENTIAL, BENIGN: Primary | ICD-10-CM

## 2024-07-12 RX ORDER — METOPROLOL SUCCINATE 50 MG/1
75 TABLET, EXTENDED RELEASE ORAL 2 TIMES DAILY
Qty: 270 TABLET | Refills: 3 | Status: SHIPPED | OUTPATIENT
Start: 2024-07-12

## 2024-07-24 ENCOUNTER — APPOINTMENT (OUTPATIENT)
Dept: CARDIOLOGY | Facility: CLINIC | Age: 79
End: 2024-07-24
Payer: MEDICARE

## 2024-08-01 ENCOUNTER — APPOINTMENT (OUTPATIENT)
Dept: CARDIOLOGY | Facility: CLINIC | Age: 79
End: 2024-08-01
Payer: MEDICARE

## 2024-08-01 VITALS
BODY MASS INDEX: 28 KG/M2 | HEART RATE: 68 BPM | WEIGHT: 164 LBS | SYSTOLIC BLOOD PRESSURE: 142 MMHG | DIASTOLIC BLOOD PRESSURE: 82 MMHG | HEIGHT: 64 IN

## 2024-08-01 DIAGNOSIS — R60.0 EDEMA OF RIGHT LOWER EXTREMITY: ICD-10-CM

## 2024-08-01 DIAGNOSIS — Z95.1 S/P CABG X 4: ICD-10-CM

## 2024-08-01 DIAGNOSIS — I25.10 CORONARY ARTERY DISEASE INVOLVING NATIVE CORONARY ARTERY OF NATIVE HEART WITHOUT ANGINA PECTORIS: Primary | ICD-10-CM

## 2024-08-01 DIAGNOSIS — I50.32 CHRONIC DIASTOLIC HEART FAILURE (MULTI): ICD-10-CM

## 2024-08-01 DIAGNOSIS — E78.00 PURE HYPERCHOLESTEROLEMIA: ICD-10-CM

## 2024-08-01 DIAGNOSIS — M71.21 SYNOVIAL CYST OF RIGHT POPLITEAL SPACE: ICD-10-CM

## 2024-08-01 DIAGNOSIS — K21.9 GASTROESOPHAGEAL REFLUX DISEASE WITHOUT ESOPHAGITIS: ICD-10-CM

## 2024-08-01 DIAGNOSIS — E78.00 PURE HYPERCHOLESTEROLEMIA: Primary | ICD-10-CM

## 2024-08-01 RX ORDER — EVOLOCUMAB 140 MG/ML
140 INJECTION, SOLUTION SUBCUTANEOUS
Qty: 6 ML | Refills: 3 | Status: CANCELLED | OUTPATIENT
Start: 2024-08-01 | End: 2025-08-01

## 2024-08-01 NOTE — PROGRESS NOTES
"  Subjective  Kristin Martines  is a 78 y.o. year old female who presents for F/U ASHD.  Rare palpitation, no chest pain, no dyspnea, no edema    Blood pressure 142/82, pulse 68, height 1.626 m (5' 4\"), weight 74.4 kg (164 lb).   Amoxicillin-pot clavulanate, Cephalexin, Ciprofloxacin, Codeine, Erythromycin base, Esomeprazole magnesium, Ezetimibe, Oxycodone-acetaminophen, and Sulfamethoxazole-trimethoprim  Past Medical History:   Diagnosis Date    Encounter for screening for malignant neoplasm of vagina     Vaginal Pap smear    Other specified noninflammatory disorders of vulva and perineum     Vulval lesion    Personal history of other medical treatment 11/01/2019    H/O bone density study    Personal history of other medical treatment     H/O mammogram     Past Surgical History:   Procedure Laterality Date    FOOT SURGERY  12/02/2019    Foot Surgery    OTHER SURGICAL HISTORY  08/09/2021    Cardiac catheterization    OTHER SURGICAL HISTORY  04/29/2020    Coronary artery bypass graft    OTHER SURGICAL HISTORY  12/02/2019    Wrist Surgery    OTHER SURGICAL HISTORY  12/02/2019    Biopsy Vulvar     Family History   Problem Relation Name Age of Onset    Hypertension Mother      Heart attack Father      Asthma Other family hx     Other (cardiac disorder) Other family hx     Hypertension Other family hx      @SOC    Current Outpatient Medications   Medication Sig Dispense Refill    acetaminophen (Tylenol) 325 mg tablet Take 1 tablet (325 mg) by mouth.      ALPRAZolam (Xanax) 0.25 mg tablet Take 1 tablet (0.25 mg) by mouth.      aspirin 81 mg chewable tablet Chew 1 tablet (81 mg).      cycloSPORINE (Restasis MultiDose) 0.05 % drops Administer 1 drop into affected eye(s).      doxazosin (Cardura) 2 mg tablet Take 1 tablet (2 mg) by mouth once daily at bedtime.      ergocalciferol (Vitamin D-2) 1.25 MG (80668 UT) capsule Take 1 capsule (50,000 Units) by mouth.      evolocumab (Repatha SureClick) 140 mg/mL injection Inject 1 " mL (140 mg) under the skin every 14 (fourteen) days.      famotidine (Pepcid) 40 mg tablet Take 1 tablet (40 mg) by mouth 2 times a day.      levothyroxine (Synthroid, Levoxyl) 75 mcg tablet Take 1 tablet (75 mcg) by mouth.      metoprolol succinate XL (Toprol-XL) 50 mg 24 hr tablet Take 1.5 tablets (75 mg) by mouth 2 times a day. 270 tablet 3    multivitamin (MULTIPLE VITAMINS ORAL) Take 1 tablet by mouth once daily.      nitroglycerin (Nitrostat) 0.3 mg SL tablet Place 1 tablet (0.3 mg) under the tongue.      olmesartan (BENIcar) 40 mg tablet Take 1 tablet (40 mg) by mouth once daily. 90 tablet 3    sertraline (Zoloft) 25 mg tablet Take 2 tablets (50 mg) by mouth once daily.      triamcinolone (Kenalog) 0.1 % ointment Apply 1 Application topically twice a day. Apply sparingly and rub in well to affected area(s) twice daily. Not for face, breast or groin.       No current facility-administered medications for this visit.        ROS  Review of Systems    Physical Exam  Physical Exam  Constitutional:       Appearance: Normal appearance.   HENT:      Head: Normocephalic and atraumatic.   Cardiovascular:      Rate and Rhythm: Normal rate and regular rhythm.   Pulmonary:      Effort: Pulmonary effort is normal.      Breath sounds: Normal breath sounds.   Abdominal:      Palpations: Abdomen is soft.   Musculoskeletal:      Right lower leg: No edema.      Left lower leg: No edema.   Skin:     General: Skin is warm and dry.   Neurological:      General: No focal deficit present.      Mental Status: She is alert and oriented to person, place, and time.   Psychiatric:         Mood and Affect: Mood normal.         Behavior: Behavior normal.          EKG  Encounter Date: 04/16/24   ECG 12 Lead    Narrative    NSR at 72/min., possible old IWMI       Problem List Items Addressed This Visit       Coronary artery disease - Primary    Relevant Orders    ECG 12 Lead    Hyperlipidemia    S/P CABG x 4     9/25/19 LIMA to LAD, SVG to  Diag,, SVG sequential [facility] PAD and PLB [facility]         GERD (gastroesophageal reflux disease)    Diastolic HF (heart failure) (Multi)    Synovial cyst of right popliteal space     Followed by Dr. Robert         Edema of right lower extremity         No follow-ups on file.      Juan Manuel Castro MD

## 2024-08-01 NOTE — ASSESSMENT & PLAN NOTE
9/25/19 LIMA to LAD, SVG to Diag,, SVG sequential to PAD and PLB at [facility] ([facility])

## 2024-08-02 RX ORDER — EVOLOCUMAB 140 MG/ML
140 INJECTION, SOLUTION SUBCUTANEOUS
Qty: 2 EACH | Refills: 3 | Status: SHIPPED | OUTPATIENT
Start: 2024-08-02

## 2024-08-22 ENCOUNTER — OFFICE VISIT (OUTPATIENT)
Dept: CARDIOLOGY | Facility: CLINIC | Age: 79
End: 2024-08-22
Payer: MEDICARE

## 2024-08-22 VITALS
WEIGHT: 163 LBS | HEART RATE: 63 BPM | SYSTOLIC BLOOD PRESSURE: 154 MMHG | OXYGEN SATURATION: 96 % | DIASTOLIC BLOOD PRESSURE: 82 MMHG | BODY MASS INDEX: 27.98 KG/M2

## 2024-08-22 DIAGNOSIS — E78.00 PURE HYPERCHOLESTEROLEMIA: ICD-10-CM

## 2024-08-22 DIAGNOSIS — R00.2 PALPITATIONS: ICD-10-CM

## 2024-08-22 DIAGNOSIS — I50.32 CHRONIC DIASTOLIC HEART FAILURE (MULTI): ICD-10-CM

## 2024-08-22 DIAGNOSIS — M71.21 SYNOVIAL CYST OF RIGHT POPLITEAL SPACE: ICD-10-CM

## 2024-08-22 DIAGNOSIS — Z95.1 S/P CABG X 4: ICD-10-CM

## 2024-08-22 DIAGNOSIS — K21.9 GASTROESOPHAGEAL REFLUX DISEASE WITHOUT ESOPHAGITIS: ICD-10-CM

## 2024-08-22 DIAGNOSIS — I25.10 CORONARY ARTERY DISEASE INVOLVING NATIVE CORONARY ARTERY OF NATIVE HEART WITHOUT ANGINA PECTORIS: Primary | ICD-10-CM

## 2024-08-22 PROCEDURE — 1159F MED LIST DOCD IN RCRD: CPT | Performed by: INTERNAL MEDICINE

## 2024-08-22 PROCEDURE — 3079F DIAST BP 80-89 MM HG: CPT | Performed by: INTERNAL MEDICINE

## 2024-08-22 PROCEDURE — 99214 OFFICE O/P EST MOD 30 MIN: CPT | Performed by: INTERNAL MEDICINE

## 2024-08-22 PROCEDURE — 3077F SYST BP >= 140 MM HG: CPT | Performed by: INTERNAL MEDICINE

## 2024-08-22 PROCEDURE — 1160F RVW MEDS BY RX/DR IN RCRD: CPT | Performed by: INTERNAL MEDICINE

## 2024-08-22 NOTE — PROGRESS NOTES
Subjective  Kristin Martines  is a 78 y.o. year old female who presents for ASHD F/U.  She refuses EKG today.  She has had palpiations, she attributes to meeting a new boyfriend.    Blood pressure 154/82, pulse 63, weight 73.9 kg (163 lb), SpO2 96%.   Amoxicillin-pot clavulanate, Cephalexin, Ciprofloxacin, Codeine, Erythromycin base, Esomeprazole magnesium, Ezetimibe, Oxycodone-acetaminophen, and Sulfamethoxazole-trimethoprim  Past Medical History:   Diagnosis Date    Encounter for screening for malignant neoplasm of vagina     Vaginal Pap smear    Other specified noninflammatory disorders of vulva and perineum     Vulval lesion    Personal history of other medical treatment 11/01/2019    H/O bone density study    Personal history of other medical treatment     H/O mammogram     Past Surgical History:   Procedure Laterality Date    FOOT SURGERY  12/02/2019    Foot Surgery    OTHER SURGICAL HISTORY  08/09/2021    Cardiac catheterization    OTHER SURGICAL HISTORY  04/29/2020    Coronary artery bypass graft    OTHER SURGICAL HISTORY  12/02/2019    Wrist Surgery    OTHER SURGICAL HISTORY  12/02/2019    Biopsy Vulvar     Family History   Problem Relation Name Age of Onset    Hypertension Mother      Heart attack Father      Asthma Other family hx     Other (cardiac disorder) Other family hx     Hypertension Other family hx      @SOC    Current Outpatient Medications   Medication Sig Dispense Refill    acetaminophen (Tylenol) 325 mg tablet Take 1 tablet (325 mg) by mouth.      ALPRAZolam (Xanax) 0.25 mg tablet Take 1 tablet (0.25 mg) by mouth.      aspirin 81 mg chewable tablet Chew 1 tablet (81 mg).      cycloSPORINE (Restasis MultiDose) 0.05 % drops Administer 1 drop into affected eye(s).      doxazosin (Cardura) 2 mg tablet Take 1 tablet (2 mg) by mouth once daily at bedtime.      ergocalciferol (Vitamin D-2) 1.25 MG (94054 UT) capsule Take 1 capsule (50,000 Units) by mouth.      evolocumab (Repatha SureClick) 140  mg/mL injection Inject 1 mL (140 mg) under the skin every 14 (fourteen) days. 2 each 3    famotidine (Pepcid) 40 mg tablet Take 1 tablet (40 mg) by mouth 2 times a day.      levothyroxine (Synthroid, Levoxyl) 75 mcg tablet Take 1 tablet (75 mcg) by mouth.      metoprolol succinate XL (Toprol-XL) 50 mg 24 hr tablet Take 1.5 tablets (75 mg) by mouth 2 times a day. 270 tablet 3    multivitamin (MULTIPLE VITAMINS ORAL) Take 1 tablet by mouth once daily.      nitroglycerin (Nitrostat) 0.3 mg SL tablet Place 1 tablet (0.3 mg) under the tongue.      olmesartan (BENIcar) 40 mg tablet Take 1 tablet (40 mg) by mouth once daily. 90 tablet 3    sertraline (Zoloft) 25 mg tablet Take 2 tablets (50 mg) by mouth once daily.      triamcinolone (Kenalog) 0.1 % ointment Apply 1 Application topically twice a day. Apply sparingly and rub in well to affected area(s) twice daily. Not for face, breast or groin.       No current facility-administered medications for this visit.        ROS  Review of Systems   All other systems reviewed and are negative.      Physical Exam  Physical Exam  Constitutional:       Appearance: Normal appearance.   HENT:      Head: Normocephalic and atraumatic.   Cardiovascular:      Rate and Rhythm: Normal rate and regular rhythm.   Pulmonary:      Effort: Pulmonary effort is normal.      Breath sounds: Normal breath sounds.   Musculoskeletal:      Right lower leg: No edema.      Left lower leg: No edema.   Skin:     General: Skin is warm and dry.   Neurological:      General: No focal deficit present.      Mental Status: She is alert and oriented to person, place, and time.   Psychiatric:         Mood and Affect: Mood normal.         Behavior: Behavior normal.          EKG  Encounter Date: 08/01/24   ECG 12 Lead    Narrative    NSR at 60/min., WNL       Problem List Items Addressed This Visit       Coronary artery disease - Primary    Relevant Orders    Follow Up In Cardiology    Hyperlipidemia    Palpitations      From anxiety from her new relationship         S/P CABG x 4     9/25/19 LIMA to LAD, SVG to Daig, SVG to Diag. SVG PAD and PLB at [facility] ([facility])         Relevant Orders    Follow Up In Cardiology    GERD (gastroesophageal reflux disease)    Diastolic HF (heart failure) (Multi)    Synovial cyst of right popliteal space         Same meds with EKG  with next scheduled visit      Juan Manuel Castro MD

## 2024-08-22 NOTE — ASSESSMENT & PLAN NOTE
9/25/19 LIMA to LAD, SVG to Luzmaria, SVG to Leahg. NEHEMIASG PAD and PLB at [facility] ([facility])

## 2024-11-12 ENCOUNTER — OFFICE VISIT (OUTPATIENT)
Dept: CARDIOLOGY | Facility: CLINIC | Age: 79
End: 2024-11-12
Payer: MEDICARE

## 2024-11-12 VITALS
DIASTOLIC BLOOD PRESSURE: 100 MMHG | WEIGHT: 160 LBS | BODY MASS INDEX: 27.31 KG/M2 | SYSTOLIC BLOOD PRESSURE: 190 MMHG | HEART RATE: 55 BPM | HEIGHT: 64 IN

## 2024-11-12 DIAGNOSIS — I50.32 CHRONIC DIASTOLIC HEART FAILURE: ICD-10-CM

## 2024-11-12 DIAGNOSIS — E78.00 PURE HYPERCHOLESTEROLEMIA: ICD-10-CM

## 2024-11-12 DIAGNOSIS — I10 HYPERTENSION, ESSENTIAL, BENIGN: Primary | ICD-10-CM

## 2024-11-12 DIAGNOSIS — I25.10 CORONARY ARTERY DISEASE INVOLVING NATIVE CORONARY ARTERY OF NATIVE HEART WITHOUT ANGINA PECTORIS: ICD-10-CM

## 2024-11-12 DIAGNOSIS — Z95.1 S/P CABG X 4: ICD-10-CM

## 2024-11-12 DIAGNOSIS — E03.9 HYPOTHYROIDISM, UNSPECIFIED TYPE: ICD-10-CM

## 2024-11-12 DIAGNOSIS — I10 PRIMARY HYPERTENSION: ICD-10-CM

## 2024-11-12 PROBLEM — Z86.39 HISTORY OF THYROID DISORDER: Status: RESOLVED | Noted: 2024-07-08 | Resolved: 2024-11-12

## 2024-11-12 PROBLEM — Z86.69 HISTORY OF HEARING LOSS: Status: RESOLVED | Noted: 2024-07-08 | Resolved: 2024-11-12

## 2024-11-12 PROBLEM — Z86.39 HISTORY OF HYPOTHYROIDISM: Status: RESOLVED | Noted: 2024-07-08 | Resolved: 2024-11-12

## 2024-11-12 LAB
ATRIAL RATE: 55 BPM
P AXIS: 9 DEGREES
P OFFSET: 184 MS
P ONSET: 118 MS
PR INTERVAL: 198 MS
Q ONSET: 217 MS
QRS COUNT: 9 BEATS
QRS DURATION: 84 MS
QT INTERVAL: 438 MS
QTC CALCULATION(BAZETT): 419 MS
QTC FREDERICIA: 425 MS
R AXIS: 14 DEGREES
T AXIS: 17 DEGREES
T OFFSET: 436 MS
VENTRICULAR RATE: 55 BPM

## 2024-11-12 PROCEDURE — 93005 ELECTROCARDIOGRAM TRACING: CPT | Performed by: INTERNAL MEDICINE

## 2024-11-12 PROCEDURE — 3077F SYST BP >= 140 MM HG: CPT | Performed by: INTERNAL MEDICINE

## 2024-11-12 PROCEDURE — 1036F TOBACCO NON-USER: CPT | Performed by: INTERNAL MEDICINE

## 2024-11-12 PROCEDURE — 1159F MED LIST DOCD IN RCRD: CPT | Performed by: INTERNAL MEDICINE

## 2024-11-12 PROCEDURE — 99215 OFFICE O/P EST HI 40 MIN: CPT | Performed by: INTERNAL MEDICINE

## 2024-11-12 PROCEDURE — 1160F RVW MEDS BY RX/DR IN RCRD: CPT | Performed by: INTERNAL MEDICINE

## 2024-11-12 PROCEDURE — 93010 ELECTROCARDIOGRAM REPORT: CPT | Performed by: INTERNAL MEDICINE

## 2024-11-12 PROCEDURE — 99215 OFFICE O/P EST HI 40 MIN: CPT | Mod: 25 | Performed by: INTERNAL MEDICINE

## 2024-11-12 PROCEDURE — 3080F DIAST BP >= 90 MM HG: CPT | Performed by: INTERNAL MEDICINE

## 2024-11-12 RX ORDER — SERTRALINE HYDROCHLORIDE 50 MG/1
1 TABLET, FILM COATED ORAL
COMMUNITY
Start: 2024-10-15

## 2024-11-12 NOTE — PROGRESS NOTES
"  Subjective  Kristin Martines  is a 78 y.o. year old female who presents for F/U ASHD.  No palpitations, no chest pain, no dyspnea, no edema.    Blood pressure (!) 180/100, pulse 55, height 1.626 m (5' 4\"), weight 72.6 kg (160 lb).   Amoxicillin-pot clavulanate, Cephalexin, Ciprofloxacin, Codeine, Erythromycin base, Esomeprazole magnesium, Ezetimibe, Oxycodone-acetaminophen, and Sulfamethoxazole-trimethoprim  Past Medical History:   Diagnosis Date    Encounter for screening for malignant neoplasm of vagina     Vaginal Pap smear    Other specified noninflammatory disorders of vulva and perineum     Vulval lesion    Personal history of other medical treatment 11/01/2019    H/O bone density study    Personal history of other medical treatment     H/O mammogram     Past Surgical History:   Procedure Laterality Date    FOOT SURGERY  12/02/2019    Foot Surgery    OTHER SURGICAL HISTORY  08/09/2021    Cardiac catheterization    OTHER SURGICAL HISTORY  04/29/2020    Coronary artery bypass graft    OTHER SURGICAL HISTORY  12/02/2019    Wrist Surgery    OTHER SURGICAL HISTORY  12/02/2019    Biopsy Vulvar     Family History   Problem Relation Name Age of Onset    Hypertension Mother      Heart attack Father      Asthma Other family hx     Other (cardiac disorder) Other family hx     Hypertension Other family hx      @SOC    Current Outpatient Medications   Medication Sig Dispense Refill    sertraline (Zoloft) 50 mg tablet Take 1 tablet (50 mg) by mouth early in the morning..      acetaminophen (Tylenol) 325 mg tablet Take 1 tablet (325 mg) by mouth.      ALPRAZolam (Xanax) 0.25 mg tablet Take 1 tablet (0.25 mg) by mouth.      aspirin 81 mg chewable tablet Chew 1 tablet (81 mg).      cycloSPORINE (Restasis MultiDose) 0.05 % drops Administer 1 drop into affected eye(s).      doxazosin (Cardura) 2 mg tablet Take 1 tablet (2 mg) by mouth once daily at bedtime.      ergocalciferol (Vitamin D-2) 1.25 MG (51241 UT) capsule Take 1 " capsule (50,000 Units) by mouth.      evolocumab (Repatha SureClick) 140 mg/mL injection Inject 1 mL (140 mg) under the skin every 14 (fourteen) days. 2 each 3    famotidine (Pepcid) 40 mg tablet Take 1 tablet (40 mg) by mouth 2 times a day.      levothyroxine (Synthroid, Levoxyl) 75 mcg tablet Take 1 tablet (75 mcg) by mouth.      metoprolol succinate XL (Toprol-XL) 50 mg 24 hr tablet Take 1.5 tablets (75 mg) by mouth 2 times a day. 270 tablet 3    multivitamin (MULTIPLE VITAMINS ORAL) Take 1 tablet by mouth once daily.      nitroglycerin (Nitrostat) 0.3 mg SL tablet Place 1 tablet (0.3 mg) under the tongue.      olmesartan (BENIcar) 40 mg tablet Take 1 tablet (40 mg) by mouth once daily. 90 tablet 3    triamcinolone (Kenalog) 0.1 % ointment Apply 1 Application topically twice a day. Apply sparingly and rub in well to affected area(s) twice daily. Not for face, breast or groin.       No current facility-administered medications for this visit.        ROS  Review of Systems    Physical Exam  Physical Exam     EKG  Encounter Date: 11/12/24   ECG 12 Lead   Result Value    Ventricular Rate 55    Atrial Rate 55    WV Interval 198    QRS Duration 84    QT Interval 438    QTC Calculation(Bazett) 419    P Axis 9    R Axis 14    T Axis 17    QRS Count 9    Q Onset 217    P Onset 118    P Offset 184    T Offset 436    QTC Fredericia 425    Narrative    Sinus bradycardia  Otherwise normal ECG  When compared with ECG of 05-JAN-2021 12:42,  Questionable change in QRS axis  T wave inversion now evident in Inferior leads       Problem List Items Addressed This Visit       Coronary artery disease - Primary    Relevant Orders    ECG 12 Lead (Completed)    Hyperlipidemia    Primary hypertension    Hypothyroidism    S/P CABG x 4     9/25/19 LIMA to LAD, SVG to Diag., SVG to PLB         Diastolic HF (heart failure)         Measure BP twice daily for 2 weeks and reutnr 2 seeks      Juan Manuel Castro MD

## 2024-11-14 ENCOUNTER — TELEPHONE (OUTPATIENT)
Dept: CARDIOLOGY | Facility: CLINIC | Age: 79
End: 2024-11-14
Payer: MEDICARE

## 2024-11-14 DIAGNOSIS — I10 HYPERTENSION, ESSENTIAL, BENIGN: Primary | ICD-10-CM

## 2024-11-14 RX ORDER — OLMESARTAN MEDOXOMIL 40 MG/1
80 TABLET ORAL DAILY
Qty: 90 TABLET | Refills: 3 | Status: SHIPPED | OUTPATIENT
Start: 2024-11-14 | End: 2024-11-14 | Stop reason: SDUPTHER

## 2024-11-14 NOTE — TELEPHONE ENCOUNTER
Per Dr. Castro- do not increase Benicar, keep at 40mg daily. Increase doxazosin to 4mg at hs.    Spoke with patient and provided instructions to clarify change in medication orders. Patient verb understanding and able to teach back.

## 2024-11-14 NOTE — TELEPHONE ENCOUNTER
Patient called to report she is continuing to have elevated blood pressures:  171/102, 176/101. 187/100.  Stated she forgot to tell Dr. Castro at recent OV 11/12 she is on Doxycycline for Diverticulitis and was not sure if that was affecting her bp.    Reviewed above with Dr. Castro and orders rec'd- increase Benicar to 80mg daily.    Spoke with patient and instructions provided. Understanding verb.

## 2024-11-15 RX ORDER — DOXAZOSIN 2 MG/1
4 TABLET ORAL NIGHTLY
Qty: 180 TABLET | Refills: 3 | Status: SHIPPED | OUTPATIENT
Start: 2024-11-15 | End: 2025-11-15

## 2024-11-15 RX ORDER — OLMESARTAN MEDOXOMIL 40 MG/1
40 TABLET ORAL DAILY
Qty: 90 TABLET | Refills: 3 | Status: SHIPPED | OUTPATIENT
Start: 2024-11-15

## 2024-11-16 ENCOUNTER — LAB (OUTPATIENT)
Dept: LAB | Facility: LAB | Age: 79
End: 2024-11-16
Payer: MEDICARE

## 2024-11-16 DIAGNOSIS — E55.9 VITAMIN D DEFICIENCY, UNSPECIFIED: ICD-10-CM

## 2024-11-16 DIAGNOSIS — E11.9 TYPE 2 DIABETES MELLITUS WITHOUT COMPLICATIONS (MULTI): ICD-10-CM

## 2024-11-16 DIAGNOSIS — E03.9 HYPOTHYROIDISM, UNSPECIFIED: Primary | ICD-10-CM

## 2024-11-16 LAB
25(OH)D3 SERPL-MCNC: 33 NG/ML (ref 30–100)
ALBUMIN SERPL BCP-MCNC: 3.8 G/DL (ref 3.4–5)
ALP SERPL-CCNC: 69 U/L (ref 33–136)
ALT SERPL W P-5'-P-CCNC: 13 U/L (ref 7–45)
ANION GAP SERPL CALC-SCNC: 10 MMOL/L (ref 10–20)
AST SERPL W P-5'-P-CCNC: 18 U/L (ref 9–39)
BILIRUB SERPL-MCNC: 0.7 MG/DL (ref 0–1.2)
BUN SERPL-MCNC: 16 MG/DL (ref 6–23)
CALCIUM SERPL-MCNC: 9.7 MG/DL (ref 8.6–10.3)
CHLORIDE SERPL-SCNC: 106 MMOL/L (ref 98–107)
CO2 SERPL-SCNC: 29 MMOL/L (ref 21–32)
CREAT SERPL-MCNC: 0.87 MG/DL (ref 0.5–1.05)
EGFRCR SERPLBLD CKD-EPI 2021: 68 ML/MIN/1.73M*2
GLUCOSE SERPL-MCNC: 92 MG/DL (ref 74–99)
POTASSIUM SERPL-SCNC: 4.5 MMOL/L (ref 3.5–5.3)
PROT SERPL-MCNC: 6.4 G/DL (ref 6.4–8.2)
SODIUM SERPL-SCNC: 140 MMOL/L (ref 136–145)
T4 FREE SERPL-MCNC: 1.16 NG/DL (ref 0.61–1.12)
TSH SERPL-ACNC: 1.64 MIU/L (ref 0.44–3.98)

## 2024-11-16 PROCEDURE — 36415 COLL VENOUS BLD VENIPUNCTURE: CPT

## 2024-11-16 PROCEDURE — 80053 COMPREHEN METABOLIC PANEL: CPT

## 2024-11-16 PROCEDURE — 83036 HEMOGLOBIN GLYCOSYLATED A1C: CPT

## 2024-11-16 PROCEDURE — 84439 ASSAY OF FREE THYROXINE: CPT

## 2024-11-16 PROCEDURE — 84443 ASSAY THYROID STIM HORMONE: CPT

## 2024-11-16 PROCEDURE — 82306 VITAMIN D 25 HYDROXY: CPT

## 2024-11-18 ENCOUNTER — HOSPITAL ENCOUNTER (OUTPATIENT)
Dept: RADIOLOGY | Facility: CLINIC | Age: 79
Discharge: HOME | End: 2024-11-18
Payer: MEDICARE

## 2024-11-18 DIAGNOSIS — R93.89 ABNORMAL FINDINGS ON DIAGNOSTIC IMAGING OF OTHER SPECIFIED BODY STRUCTURES: ICD-10-CM

## 2024-11-18 DIAGNOSIS — E04.2 NONTOXIC MULTINODULAR GOITER: ICD-10-CM

## 2024-11-18 LAB
EST. AVERAGE GLUCOSE BLD GHB EST-MCNC: 117 MG/DL
HBA1C MFR BLD: 5.7 %

## 2024-11-18 PROCEDURE — 76536 US EXAM OF HEAD AND NECK: CPT | Performed by: RADIOLOGY

## 2024-11-18 PROCEDURE — 76536 US EXAM OF HEAD AND NECK: CPT

## 2024-11-21 ENCOUNTER — APPOINTMENT (OUTPATIENT)
Dept: RADIOLOGY | Facility: CLINIC | Age: 79
End: 2024-11-21
Payer: MEDICARE

## 2024-11-26 ENCOUNTER — OFFICE VISIT (OUTPATIENT)
Dept: CARDIOLOGY | Facility: CLINIC | Age: 79
End: 2024-11-26
Payer: MEDICARE

## 2024-11-26 ENCOUNTER — LAB (OUTPATIENT)
Dept: LAB | Facility: LAB | Age: 79
End: 2024-11-26
Payer: MEDICARE

## 2024-11-26 VITALS
DIASTOLIC BLOOD PRESSURE: 90 MMHG | HEART RATE: 62 BPM | WEIGHT: 164 LBS | BODY MASS INDEX: 28 KG/M2 | HEIGHT: 64 IN | OXYGEN SATURATION: 96 % | SYSTOLIC BLOOD PRESSURE: 168 MMHG

## 2024-11-26 DIAGNOSIS — I25.2: Primary | ICD-10-CM

## 2024-11-26 DIAGNOSIS — I50.32 CHRONIC DIASTOLIC HEART FAILURE: ICD-10-CM

## 2024-11-26 DIAGNOSIS — I10 PRIMARY HYPERTENSION: ICD-10-CM

## 2024-11-26 DIAGNOSIS — I25.10 CORONARY ARTERY DISEASE INVOLVING NATIVE CORONARY ARTERY OF NATIVE HEART WITHOUT ANGINA PECTORIS: ICD-10-CM

## 2024-11-26 LAB
CHOLEST SERPL-MCNC: 143 MG/DL (ref 0–199)
CHOLESTEROL/HDL RATIO: 2.6
HDLC SERPL-MCNC: 55.5 MG/DL
LDLC SERPL CALC-MCNC: 68 MG/DL
NON HDL CHOLESTEROL: 88 MG/DL (ref 0–149)
TRIGL SERPL-MCNC: 96 MG/DL (ref 0–149)
VLDL: 19 MG/DL (ref 0–40)

## 2024-11-26 PROCEDURE — 3080F DIAST BP >= 90 MM HG: CPT | Performed by: INTERNAL MEDICINE

## 2024-11-26 PROCEDURE — 1036F TOBACCO NON-USER: CPT | Performed by: INTERNAL MEDICINE

## 2024-11-26 PROCEDURE — 1159F MED LIST DOCD IN RCRD: CPT | Performed by: INTERNAL MEDICINE

## 2024-11-26 PROCEDURE — 3077F SYST BP >= 140 MM HG: CPT | Performed by: INTERNAL MEDICINE

## 2024-11-26 PROCEDURE — 1160F RVW MEDS BY RX/DR IN RCRD: CPT | Performed by: INTERNAL MEDICINE

## 2024-11-26 PROCEDURE — 99215 OFFICE O/P EST HI 40 MIN: CPT | Performed by: INTERNAL MEDICINE

## 2024-11-26 PROCEDURE — 80061 LIPID PANEL: CPT

## 2024-11-26 PROCEDURE — 36415 COLL VENOUS BLD VENIPUNCTURE: CPT

## 2024-11-26 NOTE — PROGRESS NOTES
"  Subjective  Kristin Martines  is a 79 y.o. year old female who presents for HTN F/U HTN after increase Doxicin after home measuremants nshowedn persistent increase BP.  BP still elevated up to 196/110.  Had 2 nosebleeds yesterday.      Blood pressure 168/90, pulse 62, height 1.626 m (5' 4\"), weight 74.4 kg (164 lb), SpO2 96%.   Amoxicillin-pot clavulanate, Cephalexin, Ciprofloxacin, Codeine, Erythromycin base, Esomeprazole magnesium, Ezetimibe, Oxycodone-acetaminophen, and Sulfamethoxazole-trimethoprim  Past Medical History:   Diagnosis Date    Encounter for screening for malignant neoplasm of vagina     Vaginal Pap smear    Other specified noninflammatory disorders of vulva and perineum     Vulval lesion    Personal history of other medical treatment 11/01/2019    H/O bone density study    Personal history of other medical treatment     H/O mammogram     Past Surgical History:   Procedure Laterality Date    FOOT SURGERY  12/02/2019    Foot Surgery    OTHER SURGICAL HISTORY  08/09/2021    Cardiac catheterization    OTHER SURGICAL HISTORY  04/29/2020    Coronary artery bypass graft    OTHER SURGICAL HISTORY  12/02/2019    Wrist Surgery    OTHER SURGICAL HISTORY  12/02/2019    Biopsy Vulvar     Family History   Problem Relation Name Age of Onset    Hypertension Mother      Heart attack Father      Asthma Other family hx     Other (cardiac disorder) Other family hx     Hypertension Other family hx      @SOC    Current Outpatient Medications   Medication Sig Dispense Refill    acetaminophen (Tylenol) 325 mg tablet Take 1 tablet (325 mg) by mouth.      ALPRAZolam (Xanax) 0.25 mg tablet Take 1 tablet (0.25 mg) by mouth.      aspirin 81 mg chewable tablet Chew 1 tablet (81 mg).      cycloSPORINE (Restasis MultiDose) 0.05 % drops Administer 1 drop into affected eye(s).      doxazosin (Cardura) 2 mg tablet Take 2 tablets (4 mg) by mouth once daily at bedtime. 180 tablet 3    ergocalciferol (Vitamin D-2) 1.25 MG (77423 UT) " capsule Take 1 capsule (50,000 Units) by mouth.      evolocumab (Repatha SureClick) 140 mg/mL injection Inject 1 mL (140 mg) under the skin every 14 (fourteen) days. 2 each 3    famotidine (Pepcid) 40 mg tablet Take 1 tablet (40 mg) by mouth 2 times a day.      levothyroxine (Synthroid, Levoxyl) 75 mcg tablet Take 1 tablet (75 mcg) by mouth.      metoprolol succinate XL (Toprol-XL) 50 mg 24 hr tablet Take 1.5 tablets (75 mg) by mouth 2 times a day. 270 tablet 3    multivitamin (MULTIPLE VITAMINS ORAL) Take 1 tablet by mouth once daily.      nitroglycerin (Nitrostat) 0.3 mg SL tablet Place 1 tablet (0.3 mg) under the tongue.      olmesartan (BENIcar) 40 mg tablet Take 1 tablet (40 mg) by mouth once daily. 90 tablet 3    sertraline (Zoloft) 50 mg tablet Take 1 tablet (50 mg) by mouth early in the morning..      triamcinolone (Kenalog) 0.1 % ointment Apply 1 Application topically twice a day. Apply sparingly and rub in well to affected area(s) twice daily. Not for face, breast or groin.       No current facility-administered medications for this visit.        ROS  Review of Systems   All other systems reviewed and are negative.      Physical Exam  Physical Exam  Constitutional:       Appearance: Normal appearance.   HENT:      Head: Normocephalic and atraumatic.   Cardiovascular:      Rate and Rhythm: Normal rate and regular rhythm.   Pulmonary:      Effort: Pulmonary effort is normal.      Breath sounds: Normal breath sounds.   Abdominal:      General: Abdomen is flat.   Skin:     General: Skin is warm and dry.   Neurological:      General: No focal deficit present.      Mental Status: She is alert and oriented to person, place, and time.   Psychiatric:         Mood and Affect: Mood normal.         Behavior: Behavior normal.          EKG  Encounter Date: 11/12/24   ECG 12 Lead   Result Value    Ventricular Rate 55    Atrial Rate 55    VT Interval 198    QRS Duration 84    QT Interval 438    QTC Calculation(Bazett) 419     P Axis 9    R Axis 14    T Axis 17    QRS Count 9    Q Onset 217    P Onset 118    P Offset 184    T Offset 436    QTC Fredericia 425    Narrative    Sinus bradycardia  Otherwise normal ECG  When compared with ECG of 05-JAN-2021 12:42,  Questionable change in QRS axis  T wave inversion now evident in Inferior leads  Confirmed by Juan Manuel Castro ([id number]) on 11/12/2024 4:40:51 PM       Problem List Items Addressed This Visit       Acute myocardial infarct greater than 3 months ago - Primary    Coronary artery disease    Relevant Orders    Lipid panel    Primary hypertension     Inadequately conntrolled at 11/26/24 visit         Relevant Orders    Follow Up In Cardiology    Diastolic HF (heart failure)         Amlodpipine 5 mg daily originally recommended but the patient checked into her records ad found she was intolerant of it previously  Spironolactone 25 mg steve with BMP 1 week  Lipid profile  Return 1 month      Juan Manuel Castro MD

## 2024-11-27 ENCOUNTER — TELEPHONE (OUTPATIENT)
Dept: CARDIOLOGY | Facility: CLINIC | Age: 79
End: 2024-11-27
Payer: MEDICARE

## 2024-11-27 DIAGNOSIS — I10 HYPERTENSION, ESSENTIAL, BENIGN: ICD-10-CM

## 2024-11-27 RX ORDER — DOXAZOSIN 2 MG/1
6 TABLET ORAL NIGHTLY
Qty: 90 TABLET | Refills: 11 | OUTPATIENT
Start: 2024-11-27

## 2024-11-27 NOTE — TELEPHONE ENCOUNTER
Pt called, just saw Dr Castro and he was going to prescribe Amlodipine. After thinking about it, she realized that she has taken amlodipine in the past and had multiple side effects. She would like to be prescribed a different med for BP.     Discussed with Dr Castro and he suggested Spironolactone 25mg once daily. Discussed with pt and she does not want to take a diuretic due to it causing frequent urination. Dr Castro then suggested increasing doxazosin to 6mg once daily.

## 2024-12-02 ENCOUNTER — TELEPHONE (OUTPATIENT)
Dept: CARDIOLOGY | Facility: CLINIC | Age: 79
End: 2024-12-02
Payer: MEDICARE

## 2024-12-02 DIAGNOSIS — I10 HYPERTENSION, ESSENTIAL, BENIGN: ICD-10-CM

## 2024-12-02 RX ORDER — DOXAZOSIN 2 MG/1
4 TABLET ORAL NIGHTLY
Status: ON HOLD | COMMUNITY
Start: 2024-12-02

## 2024-12-02 RX ORDER — SPIRONOLACTONE 25 MG/1
25 TABLET ORAL DAILY
Qty: 90 TABLET | Refills: 3 | Status: ON HOLD | OUTPATIENT
Start: 2024-12-02 | End: 2025-12-02

## 2024-12-02 NOTE — TELEPHONE ENCOUNTER
Pt returned call. Verbalizes understanding. Instructed to continue to monitor bp and call office with any questions/concerns.

## 2024-12-02 NOTE — TELEPHONE ENCOUNTER
Patient called stating she is having increased edema and is requesting diuretic as discussed previous telephone encounter 11/27/24. BP today 169/101.    Reviewed with Dr. Castro. Per Dr. Castro verbal order: Decrease doxazosin to 4mg daily and start spironolactone 25mg daily. Left VM for patient to call office.

## 2024-12-08 ENCOUNTER — HOSPITAL ENCOUNTER (INPATIENT)
Facility: HOSPITAL | Age: 79
End: 2024-12-08
Attending: INTERNAL MEDICINE | Admitting: INTERNAL MEDICINE
Payer: MEDICARE

## 2024-12-08 ENCOUNTER — APPOINTMENT (OUTPATIENT)
Dept: RADIOLOGY | Facility: HOSPITAL | Age: 79
End: 2024-12-08
Payer: MEDICARE

## 2024-12-08 ENCOUNTER — APPOINTMENT (OUTPATIENT)
Dept: CARDIOLOGY | Facility: HOSPITAL | Age: 79
End: 2024-12-08
Payer: MEDICARE

## 2024-12-08 VITALS
WEIGHT: 158.29 LBS | SYSTOLIC BLOOD PRESSURE: 129 MMHG | OXYGEN SATURATION: 93 % | BODY MASS INDEX: 27.02 KG/M2 | DIASTOLIC BLOOD PRESSURE: 61 MMHG | RESPIRATION RATE: 16 BRPM | HEART RATE: 73 BPM | HEIGHT: 64 IN | TEMPERATURE: 97.7 F

## 2024-12-08 DIAGNOSIS — I48.0 PAROXYSMAL ATRIAL FIBRILLATION (MULTI): ICD-10-CM

## 2024-12-08 DIAGNOSIS — J18.9 PNEUMONIA DUE TO INFECTIOUS ORGANISM, UNSPECIFIED LATERALITY, UNSPECIFIED PART OF LUNG: ICD-10-CM

## 2024-12-08 DIAGNOSIS — I21.4 NON-ST ELEVATION (NSTEMI) MYOCARDIAL INFARCTION (MULTI): ICD-10-CM

## 2024-12-08 DIAGNOSIS — N39.0 ACUTE URINARY TRACT INFECTION: ICD-10-CM

## 2024-12-08 DIAGNOSIS — I50.30 DIASTOLIC HEART FAILURE, UNSPECIFIED HF CHRONICITY: ICD-10-CM

## 2024-12-08 DIAGNOSIS — Z95.1 S/P CABG X 4: ICD-10-CM

## 2024-12-08 DIAGNOSIS — I25.2: ICD-10-CM

## 2024-12-08 DIAGNOSIS — R94.31 ABNORMAL ELECTROCARDIOGRAM (ECG) (EKG): ICD-10-CM

## 2024-12-08 DIAGNOSIS — I48.91 ATRIAL FIBRILLATION WITH RVR (MULTI): Primary | ICD-10-CM

## 2024-12-08 DIAGNOSIS — R06.02 SHORTNESS OF BREATH: ICD-10-CM

## 2024-12-08 DIAGNOSIS — R79.89 ELEVATED TROPONIN: ICD-10-CM

## 2024-12-08 LAB
ALBUMIN SERPL BCP-MCNC: 3.9 G/DL (ref 3.4–5)
ALP SERPL-CCNC: 77 U/L (ref 33–136)
ALT SERPL W P-5'-P-CCNC: 13 U/L (ref 7–45)
ANION GAP BLDV CALCULATED.4IONS-SCNC: 14 MMOL/L (ref 10–25)
ANION GAP SERPL CALC-SCNC: 14 MMOL/L (ref 10–20)
APPEARANCE UR: CLEAR
APTT PPP: 26 SECONDS (ref 27–38)
AST SERPL W P-5'-P-CCNC: 15 U/L (ref 9–39)
BACTERIA #/AREA URNS AUTO: ABNORMAL /HPF
BACTERIA BLD CULT: NORMAL
BACTERIA BLD CULT: NORMAL
BASE EXCESS BLDV CALC-SCNC: 1.6 MMOL/L (ref -2–3)
BASOPHILS # BLD AUTO: 0.06 X10*3/UL (ref 0–0.1)
BASOPHILS NFR BLD AUTO: 0.3 %
BILIRUB SERPL-MCNC: 0.7 MG/DL (ref 0–1.2)
BILIRUB UR STRIP.AUTO-MCNC: NEGATIVE MG/DL
BNP SERPL-MCNC: 470 PG/ML (ref 0–99)
BODY TEMPERATURE: 37 DEGREES CELSIUS
BUN SERPL-MCNC: 18 MG/DL (ref 6–23)
CA-I BLDV-SCNC: 1.37 MMOL/L (ref 1.1–1.33)
CALCIUM SERPL-MCNC: 10.9 MG/DL (ref 8.6–10.3)
CARDIAC TROPONIN I PNL SERPL HS: 12 NG/L (ref 0–13)
CARDIAC TROPONIN I PNL SERPL HS: 5529 NG/L (ref 0–13)
CARDIAC TROPONIN I PNL SERPL HS: 61 NG/L (ref 0–13)
CARDIAC TROPONIN I PNL SERPL HS: 8818 NG/L (ref 0–13)
CHLORIDE BLDV-SCNC: 100 MMOL/L (ref 98–107)
CHLORIDE SERPL-SCNC: 101 MMOL/L (ref 98–107)
CO2 SERPL-SCNC: 26 MMOL/L (ref 21–32)
COLOR UR: YELLOW
CREAT SERPL-MCNC: 1.09 MG/DL (ref 0.5–1.05)
EGFRCR SERPLBLD CKD-EPI 2021: 52 ML/MIN/1.73M*2
EOSINOPHIL # BLD AUTO: 0.03 X10*3/UL (ref 0–0.4)
EOSINOPHIL NFR BLD AUTO: 0.1 %
ERYTHROCYTE [DISTWIDTH] IN BLOOD BY AUTOMATED COUNT: 13.2 % (ref 11.5–14.5)
GLUCOSE BLDV-MCNC: 130 MG/DL (ref 74–99)
GLUCOSE SERPL-MCNC: 127 MG/DL (ref 74–99)
GLUCOSE UR STRIP.AUTO-MCNC: NORMAL MG/DL
HCO3 BLDV-SCNC: 27.2 MMOL/L (ref 22–26)
HCT VFR BLD AUTO: 40.8 % (ref 36–46)
HCT VFR BLD EST: 38 % (ref 36–46)
HGB BLD-MCNC: 13.6 G/DL (ref 12–16)
HGB BLDV-MCNC: 12.6 G/DL (ref 12–16)
HOLD SPECIMEN: NORMAL
IMM GRANULOCYTES # BLD AUTO: 0.22 X10*3/UL (ref 0–0.5)
IMM GRANULOCYTES NFR BLD AUTO: 1.1 % (ref 0–0.9)
INHALED O2 CONCENTRATION: 21 %
INR PPP: 1.4 (ref 0.9–1.1)
KETONES UR STRIP.AUTO-MCNC: NEGATIVE MG/DL
LACTATE BLDV-SCNC: 1.9 MMOL/L (ref 0.4–2)
LACTATE SERPL-SCNC: 1.7 MMOL/L (ref 0.4–2)
LEUKOCYTE ESTERASE UR QL STRIP.AUTO: NEGATIVE
LYMPHOCYTES # BLD AUTO: 1.77 X10*3/UL (ref 0.8–3)
LYMPHOCYTES NFR BLD AUTO: 8.7 %
MAGNESIUM SERPL-MCNC: 1.88 MG/DL (ref 1.6–2.4)
MCH RBC QN AUTO: 30.9 PG (ref 26–34)
MCHC RBC AUTO-ENTMCNC: 33.3 G/DL (ref 32–36)
MCV RBC AUTO: 93 FL (ref 80–100)
MONOCYTES # BLD AUTO: 1.23 X10*3/UL (ref 0.05–0.8)
MONOCYTES NFR BLD AUTO: 6 %
MUCOUS THREADS #/AREA URNS AUTO: ABNORMAL /LPF
NEUTROPHILS # BLD AUTO: 17.07 X10*3/UL (ref 1.6–5.5)
NEUTROPHILS NFR BLD AUTO: 83.8 %
NITRITE UR QL STRIP.AUTO: NEGATIVE
NRBC BLD-RTO: 0 /100 WBCS (ref 0–0)
OXYHGB MFR BLDV: 23.9 % (ref 45–75)
PCO2 BLDV: 46 MM HG (ref 41–51)
PH BLDV: 7.38 PH (ref 7.33–7.43)
PH UR STRIP.AUTO: 5.5 [PH]
PLATELET # BLD AUTO: 308 X10*3/UL (ref 150–450)
PO2 BLDV: 17 MM HG (ref 35–45)
POTASSIUM BLDV-SCNC: 4.4 MMOL/L (ref 3.5–5.3)
POTASSIUM SERPL-SCNC: 4 MMOL/L (ref 3.5–5.3)
PROT SERPL-MCNC: 7.6 G/DL (ref 6.4–8.2)
PROT UR STRIP.AUTO-MCNC: ABNORMAL MG/DL
PROTHROMBIN TIME: 15.8 SECONDS (ref 9.8–12.8)
RBC # BLD AUTO: 4.4 X10*6/UL (ref 4–5.2)
RBC # UR STRIP.AUTO: ABNORMAL /UL
RBC #/AREA URNS AUTO: ABNORMAL /HPF
SAO2 % BLDV: 24 % (ref 45–75)
SODIUM BLDV-SCNC: 137 MMOL/L (ref 136–145)
SODIUM SERPL-SCNC: 137 MMOL/L (ref 136–145)
SP GR UR STRIP.AUTO: >1.05
SQUAMOUS #/AREA URNS AUTO: ABNORMAL /HPF
TSH SERPL-ACNC: 3.03 MIU/L (ref 0.44–3.98)
UROBILINOGEN UR STRIP.AUTO-MCNC: NORMAL MG/DL
WBC # BLD AUTO: 20.4 X10*3/UL (ref 4.4–11.3)
WBC #/AREA URNS AUTO: ABNORMAL /HPF

## 2024-12-08 PROCEDURE — 84484 ASSAY OF TROPONIN QUANT: CPT | Performed by: INTERNAL MEDICINE

## 2024-12-08 PROCEDURE — 2500000001 HC RX 250 WO HCPCS SELF ADMINISTERED DRUGS (ALT 637 FOR MEDICARE OP)

## 2024-12-08 PROCEDURE — 99223 1ST HOSP IP/OBS HIGH 75: CPT | Performed by: INTERNAL MEDICINE

## 2024-12-08 PROCEDURE — 2550000001 HC RX 255 CONTRASTS: Performed by: INTERNAL MEDICINE

## 2024-12-08 PROCEDURE — 87040 BLOOD CULTURE FOR BACTERIA: CPT | Mod: PARLAB

## 2024-12-08 PROCEDURE — 93005 ELECTROCARDIOGRAM TRACING: CPT

## 2024-12-08 PROCEDURE — 83605 ASSAY OF LACTIC ACID: CPT

## 2024-12-08 PROCEDURE — 36415 COLL VENOUS BLD VENIPUNCTURE: CPT

## 2024-12-08 PROCEDURE — 2500000004 HC RX 250 GENERAL PHARMACY W/ HCPCS (ALT 636 FOR OP/ED)

## 2024-12-08 PROCEDURE — 85610 PROTHROMBIN TIME: CPT

## 2024-12-08 PROCEDURE — 71046 X-RAY EXAM CHEST 2 VIEWS: CPT

## 2024-12-08 PROCEDURE — 84484 ASSAY OF TROPONIN QUANT: CPT

## 2024-12-08 PROCEDURE — 96375 TX/PRO/DX INJ NEW DRUG ADDON: CPT

## 2024-12-08 PROCEDURE — 85025 COMPLETE CBC W/AUTO DIFF WBC: CPT

## 2024-12-08 PROCEDURE — 71046 X-RAY EXAM CHEST 2 VIEWS: CPT | Performed by: RADIOLOGY

## 2024-12-08 PROCEDURE — 82810 BLOOD GASES O2 SAT ONLY: CPT

## 2024-12-08 PROCEDURE — 70498 CT ANGIOGRAPHY NECK: CPT

## 2024-12-08 PROCEDURE — 70498 CT ANGIOGRAPHY NECK: CPT | Performed by: RADIOLOGY

## 2024-12-08 PROCEDURE — 96365 THER/PROPH/DIAG IV INF INIT: CPT

## 2024-12-08 PROCEDURE — 93010 ELECTROCARDIOGRAM REPORT: CPT | Performed by: STUDENT IN AN ORGANIZED HEALTH CARE EDUCATION/TRAINING PROGRAM

## 2024-12-08 PROCEDURE — 2500000002 HC RX 250 W HCPCS SELF ADMINISTERED DRUGS (ALT 637 FOR MEDICARE OP, ALT 636 FOR OP/ED)

## 2024-12-08 PROCEDURE — 2500000004 HC RX 250 GENERAL PHARMACY W/ HCPCS (ALT 636 FOR OP/ED): Performed by: INTERNAL MEDICINE

## 2024-12-08 PROCEDURE — 82435 ASSAY OF BLOOD CHLORIDE: CPT

## 2024-12-08 PROCEDURE — 2500000002 HC RX 250 W HCPCS SELF ADMINISTERED DRUGS (ALT 637 FOR MEDICARE OP, ALT 636 FOR OP/ED): Performed by: INTERNAL MEDICINE

## 2024-12-08 PROCEDURE — 1200000002 HC GENERAL ROOM WITH TELEMETRY DAILY

## 2024-12-08 PROCEDURE — 96366 THER/PROPH/DIAG IV INF ADDON: CPT

## 2024-12-08 PROCEDURE — 70496 CT ANGIOGRAPHY HEAD: CPT | Performed by: RADIOLOGY

## 2024-12-08 PROCEDURE — 84145 PROCALCITONIN (PCT): CPT | Mod: PARLAB

## 2024-12-08 PROCEDURE — 83735 ASSAY OF MAGNESIUM: CPT

## 2024-12-08 PROCEDURE — 84132 ASSAY OF SERUM POTASSIUM: CPT

## 2024-12-08 PROCEDURE — 83880 ASSAY OF NATRIURETIC PEPTIDE: CPT

## 2024-12-08 PROCEDURE — 70450 CT HEAD/BRAIN W/O DYE: CPT | Performed by: RADIOLOGY

## 2024-12-08 PROCEDURE — 99285 EMERGENCY DEPT VISIT HI MDM: CPT | Mod: 25 | Performed by: INTERNAL MEDICINE

## 2024-12-08 PROCEDURE — 70450 CT HEAD/BRAIN W/O DYE: CPT

## 2024-12-08 PROCEDURE — 87899 AGENT NOS ASSAY W/OPTIC: CPT | Mod: PARLAB

## 2024-12-08 PROCEDURE — 81001 URINALYSIS AUTO W/SCOPE: CPT

## 2024-12-08 PROCEDURE — 87449 NOS EACH ORGANISM AG IA: CPT | Mod: PARLAB

## 2024-12-08 PROCEDURE — 84443 ASSAY THYROID STIM HORMONE: CPT

## 2024-12-08 RX ORDER — METHYLPREDNISOLONE 4 MG/1
TABLET ORAL SEE ADMIN INSTRUCTIONS
Status: ON HOLD | COMMUNITY
Start: 2024-12-07 | End: 2024-12-12

## 2024-12-08 RX ORDER — LEVOTHYROXINE SODIUM 75 UG/1
75 TABLET ORAL
Status: ACTIVE | OUTPATIENT
Start: 2024-12-09

## 2024-12-08 RX ORDER — VALSARTAN 80 MG/1
320 TABLET ORAL DAILY
Status: DISPENSED | OUTPATIENT
Start: 2024-12-09

## 2024-12-08 RX ORDER — DIGOXIN 0.25 MG/ML
250 INJECTION INTRAMUSCULAR; INTRAVENOUS ONCE
Status: COMPLETED | OUTPATIENT
Start: 2024-12-08 | End: 2024-12-08

## 2024-12-08 RX ORDER — ACETAMINOPHEN 650 MG/1
650 SUPPOSITORY RECTAL EVERY 4 HOURS PRN
Status: ACTIVE | OUTPATIENT
Start: 2024-12-08

## 2024-12-08 RX ORDER — LEVOFLOXACIN 500 MG/1
1 TABLET, FILM COATED ORAL DAILY
Status: ON HOLD | COMMUNITY
Start: 2024-12-07 | End: 2024-12-17

## 2024-12-08 RX ORDER — LEVOFLOXACIN 750 MG/1
750 TABLET ORAL EVERY 24 HOURS
Status: DISCONTINUED | OUTPATIENT
Start: 2024-12-08 | End: 2024-12-08

## 2024-12-08 RX ORDER — NAPROXEN SODIUM 220 MG/1
81 TABLET, FILM COATED ORAL DAILY
Status: DISPENSED | OUTPATIENT
Start: 2024-12-08

## 2024-12-08 RX ORDER — ACETAMINOPHEN 160 MG/5ML
650 SOLUTION ORAL EVERY 4 HOURS PRN
Status: ACTIVE | OUTPATIENT
Start: 2024-12-08

## 2024-12-08 RX ORDER — DIGOXIN 0.25 MG/ML
250 INJECTION INTRAMUSCULAR; INTRAVENOUS ONCE
Status: DISCONTINUED | OUTPATIENT
Start: 2024-12-08 | End: 2024-12-08

## 2024-12-08 RX ORDER — SERTRALINE HYDROCHLORIDE 50 MG/1
50 TABLET, FILM COATED ORAL DAILY
Status: DISPENSED | OUTPATIENT
Start: 2024-12-08

## 2024-12-08 RX ORDER — HEPARIN SODIUM 5000 [USP'U]/ML
5000 INJECTION, SOLUTION INTRAVENOUS; SUBCUTANEOUS EVERY 8 HOURS
Status: DISCONTINUED | OUTPATIENT
Start: 2024-12-08 | End: 2024-12-08

## 2024-12-08 RX ORDER — LEVOFLOXACIN 5 MG/ML
750 INJECTION, SOLUTION INTRAVENOUS ONCE
Status: COMPLETED | OUTPATIENT
Start: 2024-12-08 | End: 2024-12-08

## 2024-12-08 RX ORDER — METOPROLOL TARTRATE 1 MG/ML
5 INJECTION, SOLUTION INTRAVENOUS EVERY 6 HOURS PRN
Status: ACTIVE | OUTPATIENT
Start: 2024-12-08

## 2024-12-08 RX ORDER — ESTRADIOL 0.1 MG/G
1 CREAM VAGINAL 2 TIMES WEEKLY
Status: ON HOLD | COMMUNITY
Start: 2024-07-11

## 2024-12-08 RX ORDER — POLYETHYLENE GLYCOL 3350 17 G/17G
17 POWDER, FOR SOLUTION ORAL DAILY
Status: DISPENSED | OUTPATIENT
Start: 2024-12-08

## 2024-12-08 RX ORDER — CYCLOSPORINE 0.5 MG/ML
1 EMULSION OPHTHALMIC 2 TIMES DAILY
Status: DISPENSED | OUTPATIENT
Start: 2024-12-08

## 2024-12-08 RX ORDER — DOXAZOSIN 2 MG/1
4 TABLET ORAL NIGHTLY
Status: DISPENSED | OUTPATIENT
Start: 2024-12-08

## 2024-12-08 RX ORDER — METOPROLOL TARTRATE 1 MG/ML
5 INJECTION, SOLUTION INTRAVENOUS ONCE
Status: COMPLETED | OUTPATIENT
Start: 2024-12-08 | End: 2024-12-08

## 2024-12-08 RX ORDER — ALPRAZOLAM 0.25 MG/1
0.25 TABLET ORAL NIGHTLY
Status: DISPENSED | OUTPATIENT
Start: 2024-12-08

## 2024-12-08 RX ORDER — ACETAMINOPHEN 325 MG/1
650 TABLET ORAL EVERY 4 HOURS PRN
Status: ACTIVE | OUTPATIENT
Start: 2024-12-08

## 2024-12-08 RX ORDER — LEVOFLOXACIN 5 MG/ML
750 INJECTION, SOLUTION INTRAVENOUS
Status: ACTIVE | OUTPATIENT
Start: 2024-12-10

## 2024-12-08 RX ORDER — CYCLOSPORINE 0.5 MG/ML
1 EMULSION OPHTHALMIC 2 TIMES DAILY
Status: ON HOLD | COMMUNITY
Start: 2024-11-27

## 2024-12-08 RX ORDER — FAMOTIDINE 20 MG/1
40 TABLET, FILM COATED ORAL NIGHTLY
Status: DISPENSED | OUTPATIENT
Start: 2024-12-08

## 2024-12-08 RX ADMIN — LEVOFLOXACIN 750 MG: 750 INJECTION, SOLUTION INTRAVENOUS at 11:27

## 2024-12-08 RX ADMIN — IOHEXOL 90 ML: 350 INJECTION, SOLUTION INTRAVENOUS at 12:04

## 2024-12-08 RX ADMIN — HEPARIN SODIUM 5000 UNITS: 5000 INJECTION INTRAVENOUS; SUBCUTANEOUS at 14:49

## 2024-12-08 RX ADMIN — METOPROLOL SUCCINATE 75 MG: 50 TABLET, EXTENDED RELEASE ORAL at 21:34

## 2024-12-08 RX ADMIN — SERTRALINE HYDROCHLORIDE 50 MG: 50 TABLET ORAL at 14:49

## 2024-12-08 RX ADMIN — DOXAZOSIN 4 MG: 2 TABLET ORAL at 21:34

## 2024-12-08 RX ADMIN — FAMOTIDINE 40 MG: 20 TABLET, FILM COATED ORAL at 21:34

## 2024-12-08 RX ADMIN — METOPROLOL SUCCINATE 75 MG: 50 TABLET, EXTENDED RELEASE ORAL at 14:49

## 2024-12-08 RX ADMIN — CYCLOSPORINE 1 DROP: 0.5 EMULSION OPHTHALMIC at 21:34

## 2024-12-08 RX ADMIN — RIVAROXABAN 15 MG: 15 TABLET, FILM COATED ORAL at 16:55

## 2024-12-08 RX ADMIN — ALPRAZOLAM 0.25 MG: 0.25 TABLET ORAL at 21:35

## 2024-12-08 RX ADMIN — METOPROLOL TARTRATE 5 MG: 5 INJECTION INTRAVENOUS at 11:21

## 2024-12-08 RX ADMIN — DIGOXIN 250 MCG: 0.25 INJECTION INTRAMUSCULAR; INTRAVENOUS at 12:24

## 2024-12-08 RX ADMIN — ASPIRIN 81 MG CHEWABLE TABLET 81 MG: 81 TABLET CHEWABLE at 14:50

## 2024-12-08 SDOH — ECONOMIC STABILITY: HOUSING INSECURITY: AT ANY TIME IN THE PAST 12 MONTHS, WERE YOU HOMELESS OR LIVING IN A SHELTER (INCLUDING NOW)?: NO

## 2024-12-08 SDOH — SOCIAL STABILITY: SOCIAL INSECURITY: WITHIN THE LAST YEAR, HAVE YOU BEEN AFRAID OF YOUR PARTNER OR EX-PARTNER?: NO

## 2024-12-08 SDOH — SOCIAL STABILITY: SOCIAL INSECURITY: ARE YOU OR HAVE YOU BEEN THREATENED OR ABUSED PHYSICALLY, EMOTIONALLY, OR SEXUALLY BY ANYONE?: NO

## 2024-12-08 SDOH — SOCIAL STABILITY: SOCIAL INSECURITY: HAVE YOU HAD ANY THOUGHTS OF HARMING ANYONE ELSE?: NO

## 2024-12-08 SDOH — SOCIAL STABILITY: SOCIAL INSECURITY: DOES ANYONE TRY TO KEEP YOU FROM HAVING/CONTACTING OTHER FRIENDS OR DOING THINGS OUTSIDE YOUR HOME?: NO

## 2024-12-08 SDOH — SOCIAL STABILITY: SOCIAL INSECURITY: DO YOU FEEL UNSAFE GOING BACK TO THE PLACE WHERE YOU ARE LIVING?: NO

## 2024-12-08 SDOH — SOCIAL STABILITY: SOCIAL INSECURITY: WITHIN THE LAST YEAR, HAVE YOU BEEN HUMILIATED OR EMOTIONALLY ABUSED IN OTHER WAYS BY YOUR PARTNER OR EX-PARTNER?: NO

## 2024-12-08 SDOH — ECONOMIC STABILITY: TRANSPORTATION INSECURITY: IN THE PAST 12 MONTHS, HAS LACK OF TRANSPORTATION KEPT YOU FROM MEDICAL APPOINTMENTS OR FROM GETTING MEDICATIONS?: NO

## 2024-12-08 SDOH — SOCIAL STABILITY: SOCIAL INSECURITY: HAVE YOU HAD THOUGHTS OF HARMING ANYONE ELSE?: NO

## 2024-12-08 SDOH — ECONOMIC STABILITY: FOOD INSECURITY: WITHIN THE PAST 12 MONTHS, YOU WORRIED THAT YOUR FOOD WOULD RUN OUT BEFORE YOU GOT THE MONEY TO BUY MORE.: NEVER TRUE

## 2024-12-08 SDOH — SOCIAL STABILITY: SOCIAL INSECURITY: ARE THERE ANY APPARENT SIGNS OF INJURIES/BEHAVIORS THAT COULD BE RELATED TO ABUSE/NEGLECT?: NO

## 2024-12-08 SDOH — ECONOMIC STABILITY: INCOME INSECURITY: IN THE PAST 12 MONTHS HAS THE ELECTRIC, GAS, OIL, OR WATER COMPANY THREATENED TO SHUT OFF SERVICES IN YOUR HOME?: NO

## 2024-12-08 SDOH — SOCIAL STABILITY: SOCIAL INSECURITY: ABUSE: ADULT

## 2024-12-08 SDOH — SOCIAL STABILITY: SOCIAL INSECURITY
WITHIN THE LAST YEAR, HAVE YOU BEEN KICKED, HIT, SLAPPED, OR OTHERWISE PHYSICALLY HURT BY YOUR PARTNER OR EX-PARTNER?: NO

## 2024-12-08 SDOH — SOCIAL STABILITY: SOCIAL INSECURITY: HAS ANYONE EVER THREATENED TO HURT YOUR FAMILY OR YOUR PETS?: NO

## 2024-12-08 SDOH — SOCIAL STABILITY: SOCIAL INSECURITY: DO YOU FEEL ANYONE HAS EXPLOITED OR TAKEN ADVANTAGE OF YOU FINANCIALLY OR OF YOUR PERSONAL PROPERTY?: NO

## 2024-12-08 SDOH — SOCIAL STABILITY: SOCIAL INSECURITY
WITHIN THE LAST YEAR, HAVE YOU BEEN RAPED OR FORCED TO HAVE ANY KIND OF SEXUAL ACTIVITY BY YOUR PARTNER OR EX-PARTNER?: NO

## 2024-12-08 SDOH — ECONOMIC STABILITY: FOOD INSECURITY: WITHIN THE PAST 12 MONTHS, THE FOOD YOU BOUGHT JUST DIDN'T LAST AND YOU DIDN'T HAVE MONEY TO GET MORE.: NEVER TRUE

## 2024-12-08 SDOH — ECONOMIC STABILITY: HOUSING INSECURITY: IN THE LAST 12 MONTHS, WAS THERE A TIME WHEN YOU WERE NOT ABLE TO PAY THE MORTGAGE OR RENT ON TIME?: NO

## 2024-12-08 SDOH — ECONOMIC STABILITY: FOOD INSECURITY: HOW HARD IS IT FOR YOU TO PAY FOR THE VERY BASICS LIKE FOOD, HOUSING, MEDICAL CARE, AND HEATING?: NOT VERY HARD

## 2024-12-08 SDOH — ECONOMIC STABILITY: HOUSING INSECURITY: IN THE PAST 12 MONTHS, HOW MANY TIMES HAVE YOU MOVED WHERE YOU WERE LIVING?: 0

## 2024-12-08 ASSESSMENT — COGNITIVE AND FUNCTIONAL STATUS - GENERAL
PATIENT BASELINE BEDBOUND: NO
MOBILITY SCORE: 23
DAILY ACTIVITIY SCORE: 24
DAILY ACTIVITIY SCORE: 24
CLIMB 3 TO 5 STEPS WITH RAILING: A LITTLE
CLIMB 3 TO 5 STEPS WITH RAILING: A LITTLE
MOBILITY SCORE: 23

## 2024-12-08 ASSESSMENT — ACTIVITIES OF DAILY LIVING (ADL)
GROOMING: INDEPENDENT
ADEQUATE_TO_COMPLETE_ADL: YES
BATHING: INDEPENDENT
LACK_OF_TRANSPORTATION: NO
FEEDING YOURSELF: INDEPENDENT
PATIENT'S MEMORY ADEQUATE TO SAFELY COMPLETE DAILY ACTIVITIES?: YES
HEARING - RIGHT EAR: FUNCTIONAL
TOILETING: INDEPENDENT
DRESSING YOURSELF: INDEPENDENT
WALKS IN HOME: INDEPENDENT
JUDGMENT_ADEQUATE_SAFELY_COMPLETE_DAILY_ACTIVITIES: YES
HEARING - LEFT EAR: FUNCTIONAL

## 2024-12-08 ASSESSMENT — LIFESTYLE VARIABLES
HOW MANY STANDARD DRINKS CONTAINING ALCOHOL DO YOU HAVE ON A TYPICAL DAY: PATIENT DOES NOT DRINK
HOW OFTEN DO YOU HAVE 6 OR MORE DRINKS ON ONE OCCASION: NEVER
SKIP TO QUESTIONS 9-10: 1
AUDIT-C TOTAL SCORE: 0
HOW OFTEN DO YOU HAVE A DRINK CONTAINING ALCOHOL: NEVER
AUDIT-C TOTAL SCORE: 0

## 2024-12-08 ASSESSMENT — PAIN SCALES - GENERAL
PAINLEVEL_OUTOF10: 0 - NO PAIN
PAINLEVEL_OUTOF10: 0 - NO PAIN

## 2024-12-08 ASSESSMENT — PATIENT HEALTH QUESTIONNAIRE - PHQ9
1. LITTLE INTEREST OR PLEASURE IN DOING THINGS: NOT AT ALL
SUM OF ALL RESPONSES TO PHQ9 QUESTIONS 1 & 2: 0
1. LITTLE INTEREST OR PLEASURE IN DOING THINGS: NOT AT ALL
2. FEELING DOWN, DEPRESSED OR HOPELESS: NOT AT ALL
2. FEELING DOWN, DEPRESSED OR HOPELESS: NOT AT ALL
SUM OF ALL RESPONSES TO PHQ9 QUESTIONS 1 & 2: 0

## 2024-12-08 ASSESSMENT — COLUMBIA-SUICIDE SEVERITY RATING SCALE - C-SSRS
6. HAVE YOU EVER DONE ANYTHING, STARTED TO DO ANYTHING, OR PREPARED TO DO ANYTHING TO END YOUR LIFE?: NO
2. HAVE YOU ACTUALLY HAD ANY THOUGHTS OF KILLING YOURSELF?: NO
1. IN THE PAST MONTH, HAVE YOU WISHED YOU WERE DEAD OR WISHED YOU COULD GO TO SLEEP AND NOT WAKE UP?: NO

## 2024-12-08 NOTE — ED PROVIDER NOTES
HPI   Chief Complaint   Patient presents with    Weakness, Gen       Patient is a 79-year-old female with past medical history of diastolic heart failure, CABG x 4 in 2019, HLD, palpitations, GERD presenting with concern for generalized weakness.  Reports she has been sick with what she thought was a chest infection since last Tuesday.  Reports she completed her course of steroids and levofloxacin but had worsening generalized weakness since yesterday.  Does endorse burning sensation in her chest but otherwise denies chest pain, shortness of breath.  Endorses feeling sick to her stomach but denies emesis, abdominal pain.  Endorses regular bowel movements, no difficulties with p.o. intake.  Denies dysuria urgency, frequency.  Endorses no other history of blood clots, denies smoking history, history of COPD.  Reports she is currently on metoprolol 75 mg twice daily, doxazosin, Benicar, spironolcatone for her blood pressure.            Patient History   Past Medical History:   Diagnosis Date    Encounter for screening for malignant neoplasm of vagina     Vaginal Pap smear    Other specified noninflammatory disorders of vulva and perineum     Vulval lesion    Personal history of other medical treatment 11/01/2019    H/O bone density study    Personal history of other medical treatment     H/O mammogram     Past Surgical History:   Procedure Laterality Date    FOOT SURGERY  12/02/2019    Foot Surgery    OTHER SURGICAL HISTORY  08/09/2021    Cardiac catheterization    OTHER SURGICAL HISTORY  04/29/2020    Coronary artery bypass graft    OTHER SURGICAL HISTORY  12/02/2019    Wrist Surgery    OTHER SURGICAL HISTORY  12/02/2019    Biopsy Vulvar     Family History   Problem Relation Name Age of Onset    Hypertension Mother      Heart attack Father      Asthma Other family hx     Other (cardiac disorder) Other family hx     Hypertension Other family hx      Social History     Tobacco Use    Smoking status: Never    Smokeless  tobacco: Never   Substance Use Topics    Alcohol use: Yes     Comment: OCCASSIONAL    Drug use: Never       Physical Exam   ED Triage Vitals [12/08/24 1000]   Temperature Heart Rate Respirations BP   36 °C (96.8 °F) 68 18 (!) 67/46      Pulse Ox Temp src Heart Rate Source Patient Position   96 % -- -- --      BP Location FiO2 (%)     -- --       Physical Exam  Constitutional:       Appearance: Normal appearance.   HENT:      Head: Normocephalic and atraumatic.   Eyes:      Extraocular Movements: Extraocular movements intact.   Cardiovascular:      Rate and Rhythm: Normal rate.      Comments: Heart rate less than appreciated on monitors, approximately 100 bpm.  2+ bilateral radial and PT pulses.  Pulmonary:      Effort: Pulmonary effort is normal.      Comments: Clear to auscultation bilaterally, satting well on room air, no increased work of breathing.  Musculoskeletal:         General: Normal range of motion.      Cervical back: Normal range of motion.   Skin:     General: Skin is warm and dry.      Comments: Pale   Neurological:      General: No focal deficit present.      Mental Status: She is alert and oriented to person, place, and time.      Comments: Cranial nerves II through XII intact.  Inducible dizziness with movement but no nystagmus.  No coordination deficits with finger-to-nose.  5/5 strength in bilateral upper and lower extremities.  Sensation intact to light touch in upper and lower extremities.   Psychiatric:         Mood and Affect: Mood normal.         Behavior: Behavior normal.           ED Course & MDM   ED Course as of 12/08/24 1149   Sun Dec 08, 2024   1118 HPI      Patient presented for evaluation of acute onset dizziness and palpitations.  Patient notes that she has been ill with an upper respiratory infection for the last week.  Patient notes she is started on Levaquin and prednisone yesterday.  Patient denies history of atrial fibrillation.  Does not take blood thinning agents.  Patient  describes dizziness as feeling off balance when she got up today.  Patient went to sleep at 1030 last night.         Physical Exam     Appearance: Awake and alert, not in distress.  Skin: No rash or lesions, warm and dry  Eyes: Pupils equal and reactive, EOMI  ENT: Mucous membranes moist dentition without lesions. Pharynx clear, uvula midline.  Neck: Supple, no meningeal signs.  Pulmonary: CTA bilaterally with good air movement  Cardiac: Tachycardic rate, irregularly irregular rhythm.  Radial and DP Pulses equal BL  Abdomen: Soft and nontender, nondistended, no mass.  Genitourinary: No flank tenderness.  Musculoskeletal: No signs of trauma, strong pulses.  No tenderness along the venous system.  Neurological: Clear speech, NIH 0. CN II-XII grossly intact, no focal neuro deficit.    Psychiatric: Appropriate mood and affect.        Medical Decision Making:     Patient found to be in atrial fibrillation with RVR.  New onset.  Patient also having dizziness.  NIH is 0 at this time.  Last known well 10:30 PM.  Out of window for TNK.  Question as to whether or not symptoms may be related to underlying infection.  Septic protocol started the ED    EKG:     Independently reviewed and agree with resident interpretation.      I personally saw the patient and made/approved the management plan and take responsibility for the patient management.        Disclaimer: This note was dictated by speech recognition. Minor errors in transcription may be present.  Please call if questions.   [JA]   1135 Discussed with Dr. Helton and he accepts the patient to his service.  [JA]      ED Course User Index  [JA] Leonardo Portillo DO         Diagnoses as of 12/08/24 1149   Atrial fibrillation with RVR (Multi)   Pneumonia due to infectious organism, unspecified laterality, unspecified part of lung                 No data recorded     Rex Coma Scale Score: 15 (12/08/24 1002 : Denisse Bean RN)                           Medical  Decision Making  EKG shows a normal rate of 134bpm, atrial fibrillation, normal axis, QRS 84 ms, QTc 471 ms.  ST depressions in leads I, aVL, intermittent elevations in lead III less than 1 mm, otherwise normal ST and T wave pattern with no evidence of acute ischemia or other acute findings.    Patient is a 79-year-old female with past medical history of diastolic heart failure, CABG x 4 in 2019, HLD, palpitations, GERD presenting with concern for generalized weakness.  Initial workup concerning for atrial fibrillation with RVR.  Initially hypotensive in triage but mentating well, normotensive when brought back to room.  Patient otherwise with small retrocardiac opacity, chest pain, cough and unclear if component of pneumonia contributing to this as well.  Does have white count to 20.4 but recently started steroids. Tachycardic but in atrial fibrillation making risk stratification for sepsis more difficult.  Continued on levofloxacin given allergies to amoxicillin, erythromycin, cephalexin and only indeterminant evidence of pneumonia.  Given patient without lactate elevation, without hypotension when brought back to room, risks of IV fluids felt to outweigh benefit in patient otherwise with significant cardiac history.  Patient with some inducible vertigo without nystagmus or other focal neurologic deficits and cerebellar syndrome considered particular given patient's new A-fib RVR.  NIHSS of 0, woke up with symptoms and risks of TNK felt to far outweigh benefits.  CTA of head and neck ordered for further risk stratification of cerebellar occlusion which are pending at time of admission.  Given stabilization of blood pressure and continued atrial fibrillation with RVR, initial attempt at rate control with 5 mg IV metoprolol.  Unsuccessful rate control with one dose and case discussed further with Dr. Helton, patient's PCP, who recommended digoxin 0.25mg and admission.  Patient admitted to medicine for further  management.    Patient seen and discussed with Dr. Bandar Henriquez MD, PhD  Emergency Medicine PGY3          Procedure  Procedures     Donaldo Henriquez MD  Resident  12/08/24 2962

## 2024-12-08 NOTE — CONSULTS
"Consults  History Of Present Illness:    Kristin Martines is a 79 y.o. female presenting with pneumonia and paroxymal atrial fibrillation.PMH of HFpEF, ASDS/pCABGx 4 withost op Afib, GERD, HDLnotes eneralized weakness being sicksince last Tuesday with \"chest infection\" has neen noting a cough and also had some lower chest discomfort.  She was found to adolfo atrial fibrillatinonarrival which has spontaneously converted to NSR with PAC's.     Last Recorded Vitals:  Vitals:    12/08/24 1224 12/08/24 1230 12/08/24 1300 12/08/24 1315   BP:  143/63 97/74    Pulse: (!) 150 (!) 130 (!) 128 69   Resp:  17 18 (!) 40   Temp:       SpO2:  97% 94% 97%   Weight:       Height:           Last Labs:  CBC - 12/8/2024: 10:24 AM  20.4 13.6 308    40.8      CMP - 12/8/2024: 10:24 AM  10.9 7.6 15 --- 0.7   _ 3.9 13 77      PTT - 12/8/2024: 11:03 AM  1.4   15.8 26     Troponin I, High Sensitivity   Date/Time Value Ref Range Status   12/08/2024 11:25 AM 61 (HH) 0 - 13 ng/L Final   12/08/2024 10:24 AM 12 0 - 13 ng/L Final     BNP   Date/Time Value Ref Range Status   12/08/2024 10:24  (H) 0 - 99 pg/mL Final     Hemoglobin A1C   Date/Time Value Ref Range Status   11/16/2024 11:45 AM 5.7 (H) See comment % Final   05/02/2024 12:08 PM 5.3 see below % Final     LDL Calculated   Date/Time Value Ref Range Status   11/26/2024 01:44 PM 68 <=99 mg/dL Final     Comment:                                 Near   Borderline      AGE      Desirable  Optimal    High     High     Very High     0-19 Y     0 - 109     ---    110-129   >/= 130     ----    20-24 Y     0 - 119     ---    120-159   >/= 160     ----      >24 Y     0 -  99   100-129  130-159   160-189     >/=190     10/24/2023 12:01 PM 68 <=99 mg/dL Final     Comment:                                 Near   Borderline      AGE      Desirable  Optimal    High     High     Very High     0-19 Y     0 - 109     ---    110-129   >/= 130     ----    20-24 Y     0 - 119     ---    120-159   >/= 160     " "----      >24 Y     0 -  99   100-129  130-159   160-189     >/=190       VLDL   Date/Time Value Ref Range Status   11/26/2024 01:44 PM 19 0 - 40 mg/dL Final   10/24/2023 12:01 PM 22 0 - 40 mg/dL Final   03/17/2023 12:06 PM 15 0 - 40 mg/dL Final   10/21/2022 11:30 AM 20 0 - 40 mg/dL Final   03/02/2022 02:20 PM 24 0 - 40 mg/dL Final      Last I/O:  No intake/output data recorded.    Past Cardiology Tests (Last 3 Years):  EKG:  ECG 12 Lead 11/12/2024      ECG 12 Lead 08/01/2024      ECG 12 Lead 04/16/2024      ECG 12 Lead 01/18/2024      ECG 12 Lead 10/11/2023    Echo:  No results found for this or any previous visit from the past 1095 days.    Ejection Fractions:  No results found for: \"EF\"  Cath:  No results found for this or any previous visit from the past 1095 days.    Stress Test:  No results found for this or any previous visit from the past 1095 days.    Cardiac Imaging:  No results found for this or any previous visit from the past 1095 days.      Past Medical History:  She has a past medical history of Encounter for screening for malignant neoplasm of vagina, Other specified noninflammatory disorders of vulva and perineum, Personal history of other medical treatment (11/01/2019), and Personal history of other medical treatment.    Past Surgical History:  She has a past surgical history that includes Other surgical history (08/09/2021); Other surgical history (04/29/2020); Other surgical history (12/02/2019); Foot surgery (12/02/2019); and Other surgical history (12/02/2019).      Social History:  She reports that she has never smoked. She has never used smokeless tobacco. She reports current alcohol use. She reports that she does not use drugs.    Family History:  Family History   Problem Relation Name Age of Onset    Hypertension Mother      Heart attack Father      Asthma Other family hx     Other (cardiac disorder) Other family hx     Hypertension Other family hx         Allergies:  Amoxicillin-pot " clavulanate, Cephalexin, Ciprofloxacin, Codeine, Erythromycin base, Esomeprazole magnesium, Ezetimibe, Oxycodone-acetaminophen, and Sulfamethoxazole-trimethoprim    Inpatient Medications:  Scheduled medications   Medication Dose Route Frequency    ALPRAZolam  0.25 mg oral Nightly    aspirin  81 mg oral Daily    cycloSPORINE  1 drop Both Eyes BID    doxazosin  4 mg oral Nightly    famotidine  40 mg oral Nightly    heparin (porcine)  5,000 Units subcutaneous q8h    [START ON 12/10/2024] levoFLOXacin  750 mg intravenous q48h    [START ON 12/9/2024] levothyroxine  75 mcg oral Once per day on Monday Tuesday Wednesday Thursday Friday    metoprolol succinate XL  75 mg oral BID    perflutren lipid microspheres  0.5-10 mL of dilution intravenous Once in imaging    perflutren protein A microsphere  0.5 mL intravenous Once in imaging    polyethylene glycol  17 g oral Daily    sertraline  50 mg oral Daily    sulfur hexafluoride microsphr  2 mL intravenous Once in imaging    [START ON 12/9/2024] valsartan  320 mg oral Daily     PRN medications   Medication    acetaminophen    Or    acetaminophen    Or    acetaminophen    metoprolol    oxygen     Continuous Medications   Medication Dose Last Rate     Outpatient Medications:  Current Outpatient Medications   Medication Instructions    acetaminophen (TYLENOL) 325 mg, Every 4 hours PRN    ALPRAZolam (XANAX) 0.25 mg, Nightly    aspirin 81 mg, Daily    cycloSPORINE (Restasis) 0.05 % ophthalmic emulsion 1 drop, Both Eyes, 2 times daily    doxazosin (CARDURA) 4 mg, Nightly    ergocalciferol (VITAMIN D-2) 50,000 Units, oral, Weekly, Every monday    estradiol (ESTRACE) 1 g, 2 times weekly    famotidine (Pepcid) 40 mg tablet 1 tablet, Nightly    levoFLOXacin (Levaquin) 500 mg tablet 1 tablet, oral, Daily    levothyroxine (SYNTHROID, LEVOXYL) 75 mcg, 5 times weekly    methylPREDNISolone (Medrol Dospak) 4 mg tablets See admin instructions    metoprolol succinate XL (TOPROL-XL) 75 mg, oral, 2  times daily    multivitamin (MULTIPLE VITAMINS ORAL) 1 tablet, Daily    nitroglycerin (NITROSTAT) 0.3 mg    olmesartan (BENICAR) 40 mg, oral, Daily    Repatha SureClick 140 mg, subcutaneous, Every 14 days    sertraline (Zoloft) 50 mg tablet 1 tablet, Daily    spironolactone (ALDACTONE) 25 mg, oral, Daily    triamcinolone (Kenalog) 0.1 % ointment 1 Application, 2 times daily       Physical Exam:  GEN: WD WN 78 yo wf sitting comfortably at 30 degrees  HEENT: Atraumatic, normocephalic  CORE: Reg  LUNGS: Few rhonchi  EXT: No edema     Assessment/Plan   1.) Paroxsymal atrail fibrillation with spontaneous conversion to NSR.[id number] score = 5 C/W with hgh risk forr embolic CVA  2.) Pneumonia  3.) ASHD S/P CABG  4.) HTN  5.) HLD  6.) GERD  REC:  1.) Xarelto 20 daily  2.) Agree with echocardiogram  3.) Continue same meds fornow       Thank you for the consultatin                        Will follow with you                                           JLS    Code Status:  Full Code    I spent 30 minutes in the professional and overall care of this patient.        Juan Manuel Castro MD

## 2024-12-08 NOTE — Clinical Note
Sheath was exchanged in the right femoral artery with ACCESS KIT, MINI LIAN, 4FR X 10CML, 0.018 X 40CM, SS/SS, ECHO ENHANCED 7CM NDL.

## 2024-12-08 NOTE — H&P
"History Of Present Illness  Kristin Martines is a 79 y.o. female with a past medical history of diastolic heart failure, CABG x 4 in 2019, HLD, palpitations, GERD presenting with concern for generalized weakness.  Patient states that since last Tuesday, she has been sick with a “chest infection.\"  She states that initially, she was utilizing Mucinex, Tylenol, and elderberry, but this was not doing the trick.  She notified Dr. Helton of her symptoms, and was subsequently prescribed Medrol Dosepak and levofloxacin.  Patient states she took only 1 dose of each yesterday, and then decided to present to the emergency department for further evaluation.  She states she feels warm, but when she took her temperature was 99.3 °F.  She is also endorsing a cough productive of green mucus, green rhinorrhea, a heavy feeling in her chest, occasional palpitations that started with her infection, shortness of breath.  She states the heaviness in her chest is located the center, but denies active chest pain.  She states that just started this morning and she has never experienced it before.  She states it gets worse with cough.  She otherwise denies dizziness, syncope, leg swelling, nausea/vomiting, diarrhea, urinary symptoms, history of DVT/PE, hemoptysis, tobacco use, history of malignancy, recent surgery/recent travel/long car rides.  She states she does not use caffeine, and drinks everything caffeine free.  She does states she has been under some stress lately with work, as she works on commission.  Does not smoke or drink alcohol.    ED course: On arrival, patient's BP 67/46, heart rate 68, respirations 18, afebrile, saturating 96% on room air.  BP did go up to 114/74.  Heart rate now 140.  Respirations 26.  Labs and imaging performed, revealing creatinine 1.09 and BUN 18 (0.87 and 16 on 11/16).  Calcium 10.9.  Lactate WNL.  BNP elevated at 470.  Initial troponin 12, delta troponin 61.  TSH WNL.  White count elevated at 20.4.  " Blood cultures ordered and pending.  Chest x-ray shows questionable retrocardiac infiltrate.  Patient given 5 mg Lopressor, 250 mcg digoxin.  IV Levaquin initiated.  CT angio head and neck and CT head ordered and pending.  Urinalysis ordered and pending.  Patient to be admitted inpatient under Dr. Helton.    Past Medical History  Past Medical History:   Diagnosis Date    Encounter for screening for malignant neoplasm of vagina     Vaginal Pap smear    Other specified noninflammatory disorders of vulva and perineum     Vulval lesion    Personal history of other medical treatment 11/01/2019    H/O bone density study    Personal history of other medical treatment     H/O mammogram       Surgical History  Past Surgical History:   Procedure Laterality Date    FOOT SURGERY  12/02/2019    Foot Surgery    OTHER SURGICAL HISTORY  08/09/2021    Cardiac catheterization    OTHER SURGICAL HISTORY  04/29/2020    Coronary artery bypass graft    OTHER SURGICAL HISTORY  12/02/2019    Wrist Surgery    OTHER SURGICAL HISTORY  12/02/2019    Biopsy Vulvar        Social History  She reports that she has never smoked. She has never used smokeless tobacco. She reports current alcohol use. She reports that she does not use drugs.    Family History  Family History   Problem Relation Name Age of Onset    Hypertension Mother      Heart attack Father      Asthma Other family hx     Other (cardiac disorder) Other family hx     Hypertension Other family hx         Allergies  Amoxicillin-pot clavulanate, Cephalexin, Ciprofloxacin, Codeine, Erythromycin base, Esomeprazole magnesium, Ezetimibe, Oxycodone-acetaminophen, and Sulfamethoxazole-trimethoprim    Review of Systems  Negative except as stated above in HPI     Physical Exam  Constitutional:       General: She is not in acute distress.     Appearance: Normal appearance. She is not toxic-appearing.      Comments: Patient is alert and oriented x 3 upon my arrival.  She is in normal sinus rhythm.   "She frequently stops our interview to cough, but is not displaying conversational dyspnea.   HENT:      Head: Normocephalic and atraumatic.      Nose: Congestion and rhinorrhea present.      Mouth/Throat:      Mouth: Mucous membranes are moist.      Pharynx: Oropharynx is clear.   Eyes:      Extraocular Movements: Extraocular movements intact.      Conjunctiva/sclera: Conjunctivae normal.      Pupils: Pupils are equal, round, and reactive to light.   Cardiovascular:      Rate and Rhythm: Normal rate and regular rhythm.      Pulses: Normal pulses.   Pulmonary:      Effort: Pulmonary effort is normal.      Breath sounds: Normal breath sounds.   Abdominal:      General: Abdomen is flat. Bowel sounds are normal.      Palpations: Abdomen is soft.      Tenderness: There is no abdominal tenderness.   Musculoskeletal:         General: Normal range of motion.      Cervical back: Normal range of motion.      Right lower leg: No edema.      Left lower leg: No edema.   Skin:     General: Skin is warm and dry.   Neurological:      General: No focal deficit present.      Mental Status: She is alert and oriented to person, place, and time. Mental status is at baseline.   Psychiatric:         Mood and Affect: Mood normal.         Behavior: Behavior normal.         Thought Content: Thought content normal.          Last Recorded Vitals  Blood pressure 97/74, pulse 69, temperature 36 °C (96.8 °F), resp. rate (!) 40, height 1.626 m (5' 4\"), weight 72.6 kg (160 lb), SpO2 97%.    Relevant Results    Scheduled medications  ALPRAZolam, 0.25 mg, oral, Nightly  aspirin, 81 mg, oral, Daily  cycloSPORINE, 1 drop, Both Eyes, BID  doxazosin, 4 mg, oral, Nightly  famotidine, 40 mg, oral, Nightly  heparin (porcine), 5,000 Units, subcutaneous, q8h  levoFLOXacin, 750 mg, intravenous, q24h  levothyroxine, 75 mcg, oral, Once per day on Sunday Tuesday Wednesday Thursday Saturday  metoprolol succinate XL, 75 mg, oral, BID  perflutren lipid microspheres, " 0.5-10 mL of dilution, intravenous, Once in imaging  perflutren protein A microsphere, 0.5 mL, intravenous, Once in imaging  polyethylene glycol, 17 g, oral, Daily  sertraline, 50 mg, oral, Daily  sulfur hexafluoride microsphr, 2 mL, intravenous, Once in imaging  valsartan, 320 mg, oral, Daily      Continuous medications     PRN medications  PRN medications: acetaminophen **OR** acetaminophen **OR** acetaminophen, metoprolol, oxygen    CT angio head and neck w and wo IV contrast    Result Date: 12/8/2024  Interpreted By:  Mary Grace Byrd, STUDY: CT ANGIO HEAD AND NECK W AND WO IV CONTRAST;  12/8/2024 12:03 pm   INDICATION: Signs/Symptoms:vertigo, new a.fib, initial screening for cerebellar syndrome.     COMPARISON: Same day unenhanced head CT which is reported separately.   ACCESSION NUMBER(S): [id number]   ORDERING CLINICIAN: LUCIA ECHEVERRIA   TECHNIQUE: Subsequently, 90 ML of Omnipaque 350 was administered intravenously and axial images of the head and neck were acquired.  Coronal, sagittal, and 3-D reconstructions were provided for review.   3D reconstructions were performed utilizing independent software.   FINDINGS:     CTA HEAD FINDINGS:   Anterior circulation: There are diffuse calcifications of the intracranial segments of the internal carotid arteries. There appears to be at least mild narrowing bilaterally. There is subtle focal outpouching projecting inferomedially from the communicating segment of the right ICA measuring less than 2 mm on image 115 of 430. The proximal anterior cerebral arteries are patent. There is subtle narrowing of the proximal M1 segment of the right MCA. There is mild irregularity and narrowing of M2 and more distal MCA branches bilaterally.   Posterior circulation: The V4 segment on the right is dominant. There is mild irregularity and narrowing on the left. The basilar artery is patent. Focal outpouching projecting superior laterally on the left from the basilar tip measuring  approximately 2 mm on image 131 of 430 is favored to be related to an otherwise markedly diminutive P1 segment of the left PCA although an aneurysm could also give this appearance. Areas of irregularity and narrowing of the PCAs are suspected bilaterally.   CTA NECK FINDINGS:   Evaluation of the aortic arch and arch vessels is limited due to streak artifact from adjacent dense venous contrast. There are mild atherosclerotic changes of the visualized portion of the arch.   Carotid vessels:   The proximal common carotid arteries are degraded by artifact. They otherwise are patent. The carotid bifurcations demonstrate no measurable stenosis. There is tortuosity of the cervical ICAs bilaterally with subtle luminal irregularity but no measurable stenosis.   Vertebral vessels:   The cervical segments of the vertebral arteries are tortuous. There is subtle multifocal narrowing bilaterally due to impression from degenerative changes of the cervical spine.   Mucosal thickening and fluid are noted within the maxillary sinuses.   There are ground-glass opacities and irregular spiculated nodules in the right upper lobe, incompletely included on the obtained field-of-view but measuring up to at least 8-9 mm.   There is reversal of the normal cervical lordosis. There are multilevel degenerative changes of the cervical spine with associated central canal and neuroforaminal stenosis.         1. There is multifocal irregularity and narrowing of the anterior and posterior circulation vasculature without proximal large branch vessel cutoff. Please note MRI with diffusion-weighted imaging would be a more sensitive means of assessing for acute ischemic injury. 2. Focal outpouching arising from the communicating segment of the right ICA could represent an infundibular origin of an otherwise very small, poorly seen vessel or sub 2 mm aneurysm. Apparent focal outpouching projecting anteriorly and to the left from the basilar tip may  represent an otherwise markedly diminutive P1 segment of the left PCA although an aneurysm is not excluded. 3. Mild, multilevel narrowing of the cervical segments of the vertebral arteries due to impression from degenerative changes of the cervical spine. 4. Subtle luminal irregularity of the mid to distal cervical ICAs superimposed on vascular tortuosity is nonspecific but may be seen with FMD among other etiologies. There is no measurable stenosis. 5. Incompletely imaged irregular nodules in the right upper lobe with patchy ground-glass opacities are nonspecific and could be infectious or inflammatory in nature. Dedicated chest CT is recommended for further evaluation.   MACRO: None   Signed by: Mary Grace Byrd 12/8/2024 1:08 PM Dictation workstation:   [id number]    CT head wo IV contrast    Result Date: 12/8/2024  Interpreted By:  Gi Bloom, STUDY: CT HEAD WO IV CONTRAST;  12/8/2024 12:03 pm   INDICATION: Signs/Symptoms:dizziness.     COMPARISON: 05/24/2022   ACCESSION NUMBER(S): [id number]   ORDERING CLINICIAN: LUCIA ECHEVERRIA   TECHNIQUE: Unenhanced CT images of the head were obtained.   FINDINGS: The ventricles, cisterns and sulci are prominent, consistent with diffuse volume loss. There are areas of nonspecific white matter hypodensity, which are probably age related or microvascular in nature. These findings are similar to the prior exam. There is no acute intracranial hemorrhage, mass effect or midline shift. No extraaxial fluid collection.   No focal calvarial lesion.   Mild left ethmoid and bilateral maxillary sinus mucosal thickening. Small amount of fluid in the left maxillary sinus.           No acute intracranial hemorrhage or mass-effect.   Multifocal sinus mucosal thickening and small amount of fluid in the left maxillary sinus.   MACRO: None.   Signed by: Gi Bloom 12/8/2024 12:18 PM Dictation workstation:   [id number]    XR chest 2 views    Result Date: 12/8/2024  Interpreted By:   Gi Bloom, STUDY: XR CHEST 2 VIEWS;  12/8/2024 10:28 am   INDICATION: Signs/Symptoms:recent chest infection, burning in chest.     COMPARISON: 12/12/2019   ACCESSION NUMBER(S): [id number]   ORDERING CLINICIAN: LUCIA ECHEVERRIA   FINDINGS: Artifact from overlying monitoring leads noted. Questionable retrocardiac infiltrate versus confluence of shadows. No pleural effusion or pneumothorax. The cardiac silhouette is upper limits of normal in size status post sternotomy.       Questionable retrocardiac infiltrate. Clinical correlation and follow-up recommended.   MACRO: None.   Signed by: Gi Bloom 12/8/2024 10:38 AM Dictation workstation:   [id number]     Results for orders placed or performed during the hospital encounter of 12/08/24 (from the past 24 hours)   CBC and Auto Differential   Result Value Ref Range    WBC 20.4 (H) 4.4 - 11.3 x10*3/uL    nRBC 0.0 0.0 - 0.0 /100 WBCs    RBC 4.40 4.00 - 5.20 x10*6/uL    Hemoglobin 13.6 12.0 - 16.0 g/dL    Hematocrit 40.8 36.0 - 46.0 %    MCV 93 80 - 100 fL    MCH 30.9 26.0 - 34.0 pg    MCHC 33.3 32.0 - 36.0 g/dL    RDW 13.2 11.5 - 14.5 %    Platelets 308 150 - 450 x10*3/uL    Neutrophils % 83.8 40.0 - 80.0 %    Immature Granulocytes %, Automated 1.1 (H) 0.0 - 0.9 %    Lymphocytes % 8.7 13.0 - 44.0 %    Monocytes % 6.0 2.0 - 10.0 %    Eosinophils % 0.1 0.0 - 6.0 %    Basophils % 0.3 0.0 - 2.0 %    Neutrophils Absolute 17.07 (H) 1.60 - 5.50 x10*3/uL    Immature Granulocytes Absolute, Automated 0.22 0.00 - 0.50 x10*3/uL    Lymphocytes Absolute 1.77 0.80 - 3.00 x10*3/uL    Monocytes Absolute 1.23 (H) 0.05 - 0.80 x10*3/uL    Eosinophils Absolute 0.03 0.00 - 0.40 x10*3/uL    Basophils Absolute 0.06 0.00 - 0.10 x10*3/uL   Comprehensive metabolic panel   Result Value Ref Range    Glucose 127 (H) 74 - 99 mg/dL    Sodium 137 136 - 145 mmol/L    Potassium 4.0 3.5 - 5.3 mmol/L    Chloride 101 98 - 107 mmol/L    Bicarbonate 26 21 - 32 mmol/L    Anion Gap 14 10 - 20 mmol/L    Urea  Nitrogen 18 6 - 23 mg/dL    Creatinine 1.09 (H) 0.50 - 1.05 mg/dL    eGFR 52 (L) >60 mL/min/1.73m*2    Calcium 10.9 (H) 8.6 - 10.3 mg/dL    Albumin 3.9 3.4 - 5.0 g/dL    Alkaline Phosphatase 77 33 - 136 U/L    Total Protein 7.6 6.4 - 8.2 g/dL    AST 15 9 - 39 U/L    Bilirubin, Total 0.7 0.0 - 1.2 mg/dL    ALT 13 7 - 45 U/L   B-Type Natriuretic Peptide   Result Value Ref Range     (H) 0 - 99 pg/mL   Troponin I, High Sensitivity, Initial   Result Value Ref Range    Troponin I, High Sensitivity 12 0 - 13 ng/L   TSH with reflex to Free T4 if abnormal   Result Value Ref Range    Thyroid Stimulating Hormone 3.03 0.44 - 3.98 mIU/L   Magnesium   Result Value Ref Range    Magnesium 1.88 1.60 - 2.40 mg/dL   BLOOD GAS VENOUS FULL PANEL   Result Value Ref Range    POCT pH, Venous 7.38 7.33 - 7.43 pH    POCT pCO2, Venous 46 41 - 51 mm Hg    POCT pO2, Venous 17 (L) 35 - 45 mm Hg    POCT SO2, Venous 24 (L) 45 - 75 %    POCT Oxy Hemoglobin, Venous 23.9 (L) 45.0 - 75.0 %    POCT Hematocrit Calculated, Venous 38.0 36.0 - 46.0 %    POCT Sodium, Venous 137 136 - 145 mmol/L    POCT Potassium, Venous 4.4 3.5 - 5.3 mmol/L    POCT Chloride, Venous 100 98 - 107 mmol/L    POCT Ionized Calicum, Venous 1.37 (H) 1.10 - 1.33 mmol/L    POCT Glucose, Venous 130 (H) 74 - 99 mg/dL    POCT Lactate, Venous 1.9 0.4 - 2.0 mmol/L    POCT Base Excess, Venous 1.6 -2.0 - 3.0 mmol/L    POCT HCO3 Calculated, Venous 27.2 (H) 22.0 - 26.0 mmol/L    POCT Hemoglobin, Venous 12.6 12.0 - 16.0 g/dL    POCT Anion Gap, Venous 14.0 10.0 - 25.0 mmol/L    Patient Temperature 37.0 degrees Celsius    FiO2 21 %   Lactate   Result Value Ref Range    Lactate 1.7 0.4 - 2.0 mmol/L   Coagulation Screen   Result Value Ref Range    Protime 15.8 (H) 9.8 - 12.8 seconds    INR 1.4 (H) 0.9 - 1.1    aPTT 26 (L) 27 - 38 seconds   Troponin, High Sensitivity, 1 Hour   Result Value Ref Range    Troponin I, High Sensitivity 61 (HH) 0 - 13 ng/L          Assessment/Plan   Assessment &  Plan  Atrial fibrillation with RVR (Multi)    Atrial fibrillation with RVR  Elevated high-sensitivity troponins  Elevated BNP  History of diastolic heart failure  CABG x 4 (2019)  -Cardiology consult and recommendations appreciated  -Patient found to be in atrial fibrillation with RVR, new onset in the ED.  EKG, per ED physician, shows 134 bpm, atrial fibrillation, normal axis, QRS 84, QTc 471, intermittent elevations in lead III less than 1 mm, otherwise normal ST and T wave pattern with no evidence of acute ischemia or other acute findings  -Patient initially given 5 mg IV Lopressor which was unsuccessful, patient then given 250 mcg of digoxin per Dr. Helton.   -Patient in NSR at the time of my interview with HR in the 70's  -Last echo from 2019, echo added on  -Patient on 75 mg metoprolol 2 times daily at home, continue.  Adjustments per cardiology.  IV Lopressor as needed  -Continue home aspirin, doxazosin  -[id number] score 6, initiation of anticoagulation per cardiology/attending    Pneumonia  Shortness of breath  Leukocytosis  -Chest x-ray shows questionable retrocardiac infiltrate  -Will empirically treat for pneumonia, given patient states she has been sick with a “ chest infection” since last Tuesday and was recently on steroids/antibiotics.  Patient also with leukocytosis  -Continue IV levofloxacin given allergies to amoxicillin, erythromycin, cephalexin  -DuoNebs and albuterol as needed, oxygen as needed  -Strep pneumo and Legionella antigen, Pro-Yehuda added on and pending  -Following blood cultures and respiratory cultures    Acute kidney injury  Hypercalcemia  -Creatinine 1.09 today, 0.87 on 11/16  -Calcium 10.9 today, hold calcium supplements  -Avoid nephrotoxic medications, monitor with BMP    GERD  -Continue home medications as ordered    DVT prophylaxis  -Heparin  -SCDs      I spent 65 minutes in the professional and overall care of this patient.  Patient fully evaluated and plan as above    Chikis  F Null, PARastaC

## 2024-12-08 NOTE — Clinical Note
Catheter [facility], JL 4.0, 100C.

## 2024-12-08 NOTE — PROGRESS NOTES
Pharmacy Medication History Review    Kristin Martines is a 79 y.o. female admitted for No Principal Problem: There is no principal problem currently on the Problem List. Please update the Problem List and refresh.. Pharmacy reviewed the patient's [id number] medications and allergies for accuracy.    The list below reflectives the updated PTA list. Please review each medication in order reconciliation for additional clarification and justification.  Prior to Admission medications    Medication Sig Start Date End Date Taking? Authorizing Provider   acetaminophen (Tylenol) 325 mg tablet Take 1 tablet (325 mg) by mouth every 4 hours if needed for mild pain (1 - 3).   Yes Historical Provider, MD   ALPRAZolam (Xanax) 0.25 mg tablet Take 1 tablet (0.25 mg) by mouth once daily at bedtime. 11/12/24 12/12/24 Yes Historical Provider, MD   aspirin 81 mg chewable tablet Chew 1 tablet (81 mg) once daily. 10/4/19  Yes Historical Provider, MD   cycloSPORINE (Restasis) 0.05 % ophthalmic emulsion Administer 1 drop into both eyes 2 times a day. 11/27/24  Yes Historical Provider, MD   doxazosin (Cardura) 2 mg tablet Take 2 tablets (4 mg) by mouth once daily at bedtime. 12/2/24  Yes Juan Manuel Castro MD   famotidine (Pepcid) 40 mg tablet Take 1 tablet (40 mg) by mouth once daily at bedtime. 10/16/19  Yes Historical Provider, MD   levoFLOXacin (Levaquin) 500 mg tablet Take 1 tablet (500 mg) by mouth once daily. 12/7/24 12/17/24 Yes Historical Provider, MD   levothyroxine (Synthroid, Levoxyl) 75 mcg tablet Take 1 tablet (75 mcg) by mouth 5 times a week. Exclude weekends 5/2/18  Yes Historical Provider, MD   methylPREDNISolone (Medrol Dospak) 4 mg tablets see administration instructions. 12/7/24 12/12/24 Yes Historical Provider, MD   metoprolol succinate XL (Toprol-XL) 50 mg 24 hr tablet Take 1.5 tablets (75 mg) by mouth 2 times a day. 7/12/24  Yes Juan Manuel Castro MD   multivitamin (MULTIPLE VITAMINS ORAL) Take 1 tablet by mouth  once daily. 9/30/19  Yes Historical Provider, MD   olmesartan (BENIcar) 40 mg tablet Take 1 tablet (40 mg) by mouth once daily. 11/15/24  Yes Juan Manuel Castro MD   sertraline (Zoloft) 50 mg tablet Take 1 tablet (50 mg) by mouth once daily. 10/15/24  Yes Historical Provider, MD   ergocalciferol (Vitamin D-2) 1.25 MG (03462 UT) capsule Take 1 capsule (50,000 Units) by mouth 1 (one) time per week. Every monday 4/25/18  Yes Historical Provider, MD   estradiol (Estrace) 0.01 % (0.1 mg/gram) vaginal cream Insert 0.25 Applicatorfuls (1 g) into the vagina 2 times a week.  Patient not taking: Reported on 12/8/2024 7/11/24  no Historical Provider, MD   evolocumab (Repatha SureClick) 140 mg/mL injection Inject 1 mL (140 mg) under the skin every 14 (fourteen) days.  Patient taking differently: Inject 1 mL (140 mg) under the skin every 14 (fourteen) days. Every other Thursday 8/2/24  Yes Juan Manuel Castro MD   nitroglycerin (Nitrostat) 0.3 mg SL tablet Place 1 tablet (0.3 mg) under the tongue.  Patient not taking: Reported on 12/8/2024   no Historical Provider, MD   spironolactone (Aldactone) 25 mg tablet Take 1 tablet (25 mg) by mouth once daily.  Patient not taking: Reported on 12/8/2024 12/2/24 12/2/25 no Juan Manuel Castro MD   triamcinolone (Kenalog) 0.1 % ointment Apply 1 Application topically twice a day. Apply sparingly and rub in well to affected area(s) twice daily. Not for face, breast or groin.  Patient not taking: Reported on 12/8/2024 7/23/20  no Historical Provider, MD   cycloSPORINE (Restasis MultiDose) 0.05 % drops Administer 1 drop into affected eye(s).  12/8/24 duplicate Historical Provider, MD   doxazosin (Cardura) 2 mg tablet Take 3 tablets (6 mg) by mouth once daily at bedtime. 11/27/24 12/2/24 no Juan Manuel Castro MD        The list below reflectives the updated allergy list. Please review each documented allergy for additional clarification and justification.  Allergies  Reviewed by Denisse Bean,  RN on 12/8/2024        Severity Reactions Comments    Amoxicillin-pot Clavulanate Medium Swelling     Cephalexin Medium Itching     Ciprofloxacin Medium Unknown     Codeine Medium Unknown     Erythromycin Base Medium Unknown     Esomeprazole Magnesium Medium Unknown     Ezetimibe Medium Unknown     Oxycodone-acetaminophen Medium Other tightness in throat    Sulfamethoxazole-trimethoprim Medium Swelling             Below are additional concerns with the patient's PTA list.      Malena Alvarez

## 2024-12-08 NOTE — PROGRESS NOTES
Medication Adjustment    The following medication(s) was/were adjusted for Kristin Martines per protocol/policy due to altered renal function.    Medication(s) adjusted:   Levofloxacin 750mg IV q24h to 750mg IV q48h d/t indication of pneumonia and CrCl between 20-49 mL/min. (41.6 mL/min.)    Herman Lobato, [facility]

## 2024-12-08 NOTE — ED TRIAGE NOTES
Patient states she was sent in by dr wilson because she has had a cough, chest congestion and is feeling weak/dizzy. Patient tearful in triage.

## 2024-12-09 ENCOUNTER — APPOINTMENT (OUTPATIENT)
Dept: RADIOLOGY | Facility: HOSPITAL | Age: 79
End: 2024-12-09
Payer: MEDICARE

## 2024-12-09 ENCOUNTER — APPOINTMENT (OUTPATIENT)
Dept: CARDIOLOGY | Facility: HOSPITAL | Age: 79
End: 2024-12-09
Payer: MEDICARE

## 2024-12-09 PROBLEM — R79.89 ELEVATED TROPONIN: Status: ACTIVE | Noted: 2024-12-08

## 2024-12-09 LAB
ANION GAP SERPL CALC-SCNC: 10 MMOL/L (ref 10–20)
AORTIC VALVE MEAN GRADIENT: 6 MMHG
AORTIC VALVE PEAK VELOCITY: 1.46 M/S
AV PEAK GRADIENT: 9 MMHG
AVA (PEAK VEL): 2.18 CM2
AVA (VTI): 1.96 CM2
BUN SERPL-MCNC: 22 MG/DL (ref 6–23)
CALCIUM SERPL-MCNC: 9.8 MG/DL (ref 8.6–10.3)
CHLORIDE SERPL-SCNC: 103 MMOL/L (ref 98–107)
CO2 SERPL-SCNC: 29 MMOL/L (ref 21–32)
CREAT SERPL-MCNC: 0.96 MG/DL (ref 0.5–1.05)
EGFRCR SERPLBLD CKD-EPI 2021: 61 ML/MIN/1.73M*2
EJECTION FRACTION APICAL 4 CHAMBER: 64.8
EJECTION FRACTION: 58 %
ERYTHROCYTE [DISTWIDTH] IN BLOOD BY AUTOMATED COUNT: 13.4 % (ref 11.5–14.5)
GLUCOSE SERPL-MCNC: 89 MG/DL (ref 74–99)
HCT VFR BLD AUTO: 37.6 % (ref 36–46)
HGB BLD-MCNC: 11.8 G/DL (ref 12–16)
HOLD SPECIMEN: NORMAL
LEFT ATRIUM VOLUME AREA LENGTH INDEX BSA: 30.9 ML/M2
LEFT VENTRICLE INTERNAL DIMENSION DIASTOLE: 3.9 CM (ref 3.5–6)
LEFT VENTRICULAR OUTFLOW TRACT DIAMETER: 1.9 CM
LEGIONELLA AG UR QL: NEGATIVE
MCH RBC QN AUTO: 29.7 PG (ref 26–34)
MCHC RBC AUTO-ENTMCNC: 31.4 G/DL (ref 32–36)
MCV RBC AUTO: 95 FL (ref 80–100)
MITRAL VALVE E/A RATIO: 1.52
NRBC BLD-RTO: 0 /100 WBCS (ref 0–0)
PLATELET # BLD AUTO: 320 X10*3/UL (ref 150–450)
POTASSIUM SERPL-SCNC: 4 MMOL/L (ref 3.5–5.3)
PROCALCITONIN SERPL-MCNC: 0.77 NG/ML
RBC # BLD AUTO: 3.97 X10*6/UL (ref 4–5.2)
RIGHT VENTRICLE FREE WALL PEAK S': 9.25 CM/S
RIGHT VENTRICLE PEAK SYSTOLIC PRESSURE: 32.8 MMHG
S PNEUM AG UR QL: NEGATIVE
SODIUM SERPL-SCNC: 138 MMOL/L (ref 136–145)
TRICUSPID ANNULAR PLANE SYSTOLIC EXCURSION: 1.9 CM
UFH PPP CHRO-ACNC: 0.3 IU/ML
WBC # BLD AUTO: 15.2 X10*3/UL (ref 4.4–11.3)

## 2024-12-09 PROCEDURE — 93306 TTE W/DOPPLER COMPLETE: CPT | Performed by: STUDENT IN AN ORGANIZED HEALTH CARE EDUCATION/TRAINING PROGRAM

## 2024-12-09 PROCEDURE — 36415 COLL VENOUS BLD VENIPUNCTURE: CPT

## 2024-12-09 PROCEDURE — 85027 COMPLETE CBC AUTOMATED: CPT

## 2024-12-09 PROCEDURE — 2500000001 HC RX 250 WO HCPCS SELF ADMINISTERED DRUGS (ALT 637 FOR MEDICARE OP): Performed by: STUDENT IN AN ORGANIZED HEALTH CARE EDUCATION/TRAINING PROGRAM

## 2024-12-09 PROCEDURE — 36415 COLL VENOUS BLD VENIPUNCTURE: CPT | Performed by: INTERNAL MEDICINE

## 2024-12-09 PROCEDURE — 93306 TTE W/DOPPLER COMPLETE: CPT

## 2024-12-09 PROCEDURE — 71046 X-RAY EXAM CHEST 2 VIEWS: CPT | Performed by: STUDENT IN AN ORGANIZED HEALTH CARE EDUCATION/TRAINING PROGRAM

## 2024-12-09 PROCEDURE — 87070 CULTURE OTHR SPECIMN AEROBIC: CPT | Mod: PARLAB

## 2024-12-09 PROCEDURE — 1200000002 HC GENERAL ROOM WITH TELEMETRY DAILY

## 2024-12-09 PROCEDURE — 71046 X-RAY EXAM CHEST 2 VIEWS: CPT

## 2024-12-09 PROCEDURE — 2500000001 HC RX 250 WO HCPCS SELF ADMINISTERED DRUGS (ALT 637 FOR MEDICARE OP)

## 2024-12-09 PROCEDURE — 2500000004 HC RX 250 GENERAL PHARMACY W/ HCPCS (ALT 636 FOR OP/ED): Performed by: NURSE PRACTITIONER

## 2024-12-09 PROCEDURE — 2500000002 HC RX 250 W HCPCS SELF ADMINISTERED DRUGS (ALT 637 FOR MEDICARE OP, ALT 636 FOR OP/ED)

## 2024-12-09 PROCEDURE — 80048 BASIC METABOLIC PNL TOTAL CA: CPT

## 2024-12-09 PROCEDURE — 2500000001 HC RX 250 WO HCPCS SELF ADMINISTERED DRUGS (ALT 637 FOR MEDICARE OP): Performed by: INTERNAL MEDICINE

## 2024-12-09 PROCEDURE — 99233 SBSQ HOSP IP/OBS HIGH 50: CPT | Performed by: INTERNAL MEDICINE

## 2024-12-09 PROCEDURE — 85520 HEPARIN ASSAY: CPT | Performed by: INTERNAL MEDICINE

## 2024-12-09 RX ORDER — CLOPIDOGREL BISULFATE 75 MG/1
75 TABLET ORAL DAILY
Status: DISCONTINUED | OUTPATIENT
Start: 2024-12-10 | End: 2024-12-10

## 2024-12-09 RX ORDER — CLOPIDOGREL BISULFATE 75 MG/1
600 TABLET ORAL ONCE
Status: COMPLETED | OUTPATIENT
Start: 2024-12-09 | End: 2024-12-09

## 2024-12-09 RX ORDER — METOPROLOL SUCCINATE 50 MG/1
100 TABLET, EXTENDED RELEASE ORAL 2 TIMES DAILY
Status: DISCONTINUED | OUTPATIENT
Start: 2024-12-09 | End: 2024-12-12 | Stop reason: HOSPADM

## 2024-12-09 RX ORDER — HEPARIN SODIUM 10000 [USP'U]/100ML
0-4000 INJECTION, SOLUTION INTRAVENOUS CONTINUOUS
Status: DISCONTINUED | OUTPATIENT
Start: 2024-12-09 | End: 2024-12-10

## 2024-12-09 RX ORDER — HEPARIN SODIUM 5000 [USP'U]/ML
2000-4000 INJECTION, SOLUTION INTRAVENOUS; SUBCUTANEOUS EVERY 4 HOURS PRN
Status: DISCONTINUED | OUTPATIENT
Start: 2024-12-09 | End: 2024-12-10

## 2024-12-09 RX ADMIN — HEPARIN SODIUM 885 UNITS/HR: 10000 INJECTION, SOLUTION INTRAVENOUS at 16:31

## 2024-12-09 RX ADMIN — LEVOTHYROXINE SODIUM 75 MCG: 0.07 TABLET ORAL at 06:04

## 2024-12-09 RX ADMIN — METOPROLOL SUCCINATE 100 MG: 50 TABLET, EXTENDED RELEASE ORAL at 20:33

## 2024-12-09 RX ADMIN — ALPRAZOLAM 0.25 MG: 0.25 TABLET ORAL at 20:34

## 2024-12-09 RX ADMIN — VALSARTAN 320 MG: 80 TABLET, FILM COATED ORAL at 09:29

## 2024-12-09 RX ADMIN — DOXAZOSIN 4 MG: 2 TABLET ORAL at 20:33

## 2024-12-09 RX ADMIN — ASPIRIN 81 MG CHEWABLE TABLET 81 MG: 81 TABLET CHEWABLE at 09:30

## 2024-12-09 RX ADMIN — METOPROLOL SUCCINATE 75 MG: 50 TABLET, EXTENDED RELEASE ORAL at 09:30

## 2024-12-09 RX ADMIN — SERTRALINE HYDROCHLORIDE 50 MG: 50 TABLET ORAL at 09:30

## 2024-12-09 RX ADMIN — CLOPIDOGREL BISULFATE 600 MG: 75 TABLET ORAL at 16:05

## 2024-12-09 RX ADMIN — FAMOTIDINE 40 MG: 20 TABLET, FILM COATED ORAL at 20:33

## 2024-12-09 RX ADMIN — CYCLOSPORINE 1 DROP: 0.5 EMULSION OPHTHALMIC at 09:31

## 2024-12-09 RX ADMIN — CYCLOSPORINE 1 DROP: 0.5 EMULSION OPHTHALMIC at 20:34

## 2024-12-09 ASSESSMENT — PAIN SCALES - GENERAL
PAINLEVEL_OUTOF10: 0 - NO PAIN
PAINLEVEL_OUTOF10: 0 - NO PAIN

## 2024-12-09 NOTE — PROGRESS NOTES
Subjective Data:  No chest pain    Overnight Events:    Elevated troponin     Objective Data:  Last Recorded Vitals:  Vitals:    12/09/24 0416 12/09/24 0744 12/09/24 0930 12/09/24 1139   BP: 152/76 (!) 182/77 162/77 167/78   BP Location: Left arm      Patient Position: Lying      Pulse: 73 71 74 68   Resp: 15      Temp: 36.6 °C (97.9 °F) 36 °C (96.8 °F)  36.2 °C (97.2 °F)   TempSrc: Temporal      SpO2: 93% 90%  93%   Weight: 73.8 kg (162 lb 11.2 oz)      Height:           Last Labs:  CBC - 12/9/2024:  6:37 AM  15.2 11.8 320    37.6      CMP - 12/9/2024:  6:37 AM  9.8 7.6 15 --- 0.7   _ 3.9 13 77      PTT - 12/8/2024: 11:03 AM  1.4   15.8 26     TROPHS   Date/Time Value Ref Range Status   12/08/2024 03:53 PM 8,818 0 - 13 ng/L Final     Comment:     Previous result verified on 12/8/2024 1217 on specimen/case [id number] called with component TRPHS for procedure Troponin, High Sensitivity, 1 Hour with value 61 ng/L.   12/08/2024 02:44 PM 5,529 0 - 13 ng/L Final     Comment:     Previous result verified on 12/8/2024 1217 on specimen/case [id number] called with component TRPHS for procedure Troponin, High Sensitivity, 1 Hour with value 61 ng/L.   12/08/2024 11:25 AM 61 0 - 13 ng/L Final     BNP   Date/Time Value Ref Range Status   12/08/2024 10:24  0 - 99 pg/mL Final     HGBA1C   Date/Time Value Ref Range Status   11/16/2024 11:45 AM 5.7 See comment % Final   05/02/2024 12:08 PM 5.3 see below % Final     LDLCALC   Date/Time Value Ref Range Status   11/26/2024 01:44 PM 68 <=99 mg/dL Final     Comment:                                 Near   Borderline      AGE      Desirable  Optimal    High     High     Very High     0-19 Y     0 - 109     ---    110-129   >/= 130     ----    20-24 Y     0 - 119     ---    120-159   >/= 160     ----      >24 Y     0 -  99   100-129  130-159   160-189     >/=190     10/24/2023 12:01 PM 68 <=99 mg/dL Final     Comment:                                 Near   Borderline       "AGE      Desirable  Optimal    High     High     Very High     0-19 Y     0 - 109     ---    110-129   >/= 130     ----    20-24 Y     0 - 119     ---    120-159   >/= 160     ----      >24 Y     0 -  99   100-129  130-159   160-189     >/=190       VLDL   Date/Time Value Ref Range Status   11/26/2024 01:44 PM 19 0 - 40 mg/dL Final   10/24/2023 12:01 PM 22 0 - 40 mg/dL Final   03/17/2023 12:06 PM 15 0 - 40 mg/dL Final   10/21/2022 11:30 AM 20 0 - 40 mg/dL Final   03/02/2022 02:20 PM 24 0 - 40 mg/dL Final      Last I/O:  I/O last 3 completed shifts:  In: - (0 mL/kg)   Out: 200 (2.7 mL/kg) [Urine:200 (0.1 mL/kg/hr)]  Weight: 73.8 kg     Past Cardiology Tests (Last 3 Years):  EKG:  ECG 12 lead 12/08/2024 (Preliminary)      ECG 12 Lead 11/12/2024      ECG 12 Lead 08/01/2024      ECG 12 Lead 04/16/2024      ECG 12 Lead 01/18/2024      ECG 12 Lead 10/11/2023    Echo:  No results found for this or any previous visit from the past 1095 days.    Ejection Fractions:  No results found for: \"EF\"  Cath:  No results found for this or any previous visit from the past 1095 days.    Stress Test:  No results found for this or any previous visit from the past 1095 days.    Cardiac Imaging:  No results found for this or any previous visit from the past 1095 days.      Inpatient Medications:  Scheduled medications   Medication Dose Route Frequency    ALPRAZolam  0.25 mg oral Nightly    aspirin  81 mg oral Daily    cycloSPORINE  1 drop Both Eyes BID    doxazosin  4 mg oral Nightly    famotidine  40 mg oral Nightly    [START ON 12/10/2024] levoFLOXacin  750 mg intravenous q48h    levothyroxine  75 mcg oral Once per day on Monday Tuesday Wednesday Thursday Friday    metoprolol succinate XL  100 mg oral BID    perflutren lipid microspheres  0.5-10 mL of dilution intravenous Once in imaging    perflutren protein A microsphere  0.5 mL intravenous Once in imaging    polyethylene glycol  17 g oral Daily    sertraline  50 mg oral Daily    sulfur " hexafluoride microsphr  2 mL intravenous Once in imaging    valsartan  320 mg oral Daily     PRN medications   Medication    acetaminophen    Or    acetaminophen    Or    acetaminophen    metoprolol    oxygen     Continuous Medications   Medication Dose Last Rate       Physical Exam:  GEN: SD SN 78 yo wf sitting 45 degrees in the bed  HEENT: Atraumatic, normocephalic  COR: Reg.  LUNGS: Clear  EXT: Warm     Assessment/Plan   1.) Acute location indeterminate NSTEM with peak Troponin to 8000 range with ASHD S/P CABG  2.) Paroxysmal atrail fibrillation  3.)HLD  PLAN:  1.) Await echocardiogram  2.) Cardiac catheterization will discuss with Dr. Gordon  Peripheral IV 12/08/24 18 G Left;Proximal Forearm (Active)   Site Assessment Clean;Dry;Intact 12/09/24 0900   Dressing Type Transparent 12/09/24 0900   Line Status Capped 12/09/24 0900   Dressing Status Clean;Dry 12/09/24 0900   Number of days: 1       Code Status:  Full Code    I spent 30 minutes in the professional and overall care of this patient.        Juan Manuel Castro MD

## 2024-12-09 NOTE — PROGRESS NOTES
Kristin Martines is a 79 y.o. female on day 1 of admission presenting with Atrial fibrillation with RVR (Multi).      Subjective     Pt awake, conversant, and A&O x 3. Pt's heart rate was in the 90's upon examination. Pt stated that she has been experiencing SOB and labored breathing. Today's labs read a glucose of 89, sodium of 138, WBC of 15.2, RBC of 3.97, hemoglobin of 11.8, and hematocrit of 37.6. Cardiology was consulted and provided their input. Still awaiting the results of the echocardiogram which will help determine if a heart cath is needed. Pt's metoprolol has been increased to 100 mg to help better manage her atrial fibrillation. Pt is currently taking Xarelto 20 mg daily for protection against thromboembolism. As per pharmacy - her levofloxacin 750 mg IV q24hrs was adjusted to 750 mg IV q48hrs to better treat her pneumonia and is better for her renal function. Plan discussed with interdisciplinary team, continue current and repeat labs in the AM.     Discharge planning discussed with patient and care team. Therapy evaluations ordered.  Patient aware and agreeable to current plan, continue plan as above.     I spent a total of 50 minutes on the date of the service which included preparing to see the patient, face-to-face patient care, completing clinical documentation, obtaining and/or reviewing separately obtained history, performing a medically appropriate examination, counseling and educating the patient/family/caregiver, ordering medications, tests, or procedures, communicating with other HCPs (not separately reported), independently interpreting results (not separately reported), communicating results to the patient/family/caregiver, and care coordination (not separately reported).          Objective     Last Recorded Vitals  /78   Pulse 68   Temp 36.2 °C (97.2 °F)   Resp 15   Wt 73.8 kg (162 lb 11.2 oz)   SpO2 93%   Intake/Output last 3 Shifts:    Intake/Output Summary (Last 24 hours)  at 12/9/2024 1401  Last data filed at 12/8/2024 2200  Gross per 24 hour   Intake --   Output 200 ml   Net -200 ml       Admission Weight  Weight: 72.6 kg (160 lb) (12/08/24 1000)    Daily Weight  12/09/24 : 73.8 kg (162 lb 11.2 oz)    Image Results  Electrocardiogram, 12-lead PRN ACS symptoms  Atrial fibrillation with rapid ventricular response  Marked ST abnormality, possible lateral subendocardial injury  Abnormal ECG  When compared with ECG of 12-NOV-2024 11:24,  Atrial fibrillation has replaced Sinus rhythm  Vent. rate has increased BY  79 BPM  ST now depressed in Lateral leads  T wave inversion now evident in Lateral leads  ECG 12 lead  Sinus rhythm with Premature atrial complexes with Aberrant conduction  Minimal voltage criteria for LVH, may be normal variant ( R in aVL )  Borderline ECG  When compared with ECG of 08-DEC-2024 10:11, (unconfirmed)  Sinus rhythm has replaced Atrial fibrillation  Vent. rate has decreased BY  67 BPM  T wave inversion no longer evident in Lateral leads  XR chest 2 views  Narrative: Interpreted By:  Cal Noyola,   STUDY:  XR CHEST 2 VIEWS;  12/9/2024 9:47 am      INDICATION:  Signs/Symptoms:PNA.      COMPARISON:  12/08/2024 chest x-ray      ACCESSION NUMBER(S):  [id number]      ORDERING CLINICIAN:  CARMEN MENDEHNALL      FINDINGS:      CARDIOMEDIASTINAL SILHOUETTE:  Cardiomediastinal silhouette is normal in size and configuration.  Previous median sternotomy.      LUNGS:  Less conspicuous previously seen retrocardiac nonspecific  atelectasis/infiltrate. No new major infiltrate or pleural effusion.  No pneumothorax.      ABDOMEN:  No remarkable upper abdominal findings.      BONES:  Multilevel degenerative changes.      Impression: 1. No new major infiltrate or pleural effusion. Previously seen  nonspecific retrocardiac infiltrate/atelectasis is less conspicuous              MACRO:  None      Signed by: Cal Noyola 12/9/2024 10:47 AM  Dictation workstation:    "[id number]      Physical Exam    Relevant Results               Assessment/Plan                  Assessment & Plan  Atrial fibrillation with RVR (Multi)    Elevated troponin          Jemal Helton MD   Physician  Internal Medicine     H&P      Addendum     Date of Service: 12/8/2024  1:37 PM     Addendum       Expand All Collapse All    History Of Present Illness  Kristin Martines is a 79 y.o. female with a past medical history of diastolic heart failure, CABG x 4 in 2019, HLD, palpitations, GERD presenting with concern for generalized weakness.  Patient states that since last Tuesday, she has been sick with a “chest infection.\"  She states that initially, she was utilizing Mucinex, Tylenol, and elderberry, but this was not doing the trick.  She notified Dr. Helton of her symptoms, and was subsequently prescribed Medrol Dosepak and levofloxacin.  Patient states she took only 1 dose of each yesterday, and then decided to present to the emergency department for further evaluation.  She states she feels warm, but when she took her temperature was 99.3 °F.  She is also endorsing a cough productive of green mucus, green rhinorrhea, a heavy feeling in her chest, occasional palpitations that started with her infection, shortness of breath.  She states the heaviness in her chest is located the center, but denies active chest pain.  She states that just started this morning and she has never experienced it before.  She states it gets worse with cough.  She otherwise denies dizziness, syncope, leg swelling, nausea/vomiting, diarrhea, urinary symptoms, history of DVT/PE, hemoptysis, tobacco use, history of malignancy, recent surgery/recent travel/long car rides.  She states she does not use caffeine, and drinks everything caffeine free.  She does states she has been under some stress lately with work, as she works on [employer].  Does not smoke or drink alcohol.     ED course: On arrival, patient's BP 67/46, heart rate " 68, respirations 18, afebrile, saturating 96% on room air.  BP did go up to 114/74.  Heart rate now 140.  Respirations 26.  Labs and imaging performed, revealing creatinine 1.09 and BUN 18 (0.87 and 16 on 11/16).  Calcium 10.9.  Lactate WNL.  BNP elevated at 470.  Initial troponin 12, delta troponin 61.  TSH WNL.  White count elevated at 20.4.  Blood cultures ordered and pending.  Chest x-ray shows questionable retrocardiac infiltrate.  Patient given 5 mg Lopressor, 250 mcg digoxin.  IV Levaquin initiated.  CT angio head and neck and CT head ordered and pending.  Urinalysis ordered and pending.  Patient to be admitted inpatient under Dr. Helton.     Past Medical History  Medical History        Past Medical History:   Diagnosis Date    Encounter for screening for malignant neoplasm of vagina       Vaginal Pap smear    Other specified noninflammatory disorders of vulva and perineum       Vulval lesion    Personal history of other medical treatment 11/01/2019     H/O bone density study    Personal history of other medical treatment       H/O mammogram            Surgical History  Surgical History         Past Surgical History:   Procedure Laterality Date    FOOT SURGERY   12/02/2019     Foot Surgery    OTHER SURGICAL HISTORY   08/09/2021     Cardiac catheterization    OTHER SURGICAL HISTORY   04/29/2020     Coronary artery bypass graft    OTHER SURGICAL HISTORY   12/02/2019     Wrist Surgery    OTHER SURGICAL HISTORY   12/02/2019     Biopsy Vulvar            Social History  She reports that she has never smoked. She has never used smokeless tobacco. She reports current alcohol use. She reports that she does not use drugs.     Family History  Family History          Family History   Problem Relation Name Age of Onset    Hypertension Mother        Heart attack Father        Asthma Other family hx      Other (cardiac disorder) Other family hx      Hypertension Other family hx              Allergies  Amoxicillin-pot  "clavulanate, Cephalexin, Ciprofloxacin, Codeine, Erythromycin base, Esomeprazole magnesium, Ezetimibe, Oxycodone-acetaminophen, and Sulfamethoxazole-trimethoprim     Review of Systems  Negative except as stated above in HPI     Physical Exam  Constitutional:       General: She is not in acute distress.     Appearance: Normal appearance. She is not toxic-appearing.      Comments: Patient is alert and oriented x 3 upon my arrival.  She is in normal sinus rhythm.  She frequently stops our interview to cough, but is not displaying conversational dyspnea.   HENT:      Head: Normocephalic and atraumatic.      Nose: Congestion and rhinorrhea present.      Mouth/Throat:      Mouth: Mucous membranes are moist.      Pharynx: Oropharynx is clear.   Eyes:      Extraocular Movements: Extraocular movements intact.      Conjunctiva/sclera: Conjunctivae normal.      Pupils: Pupils are equal, round, and reactive to light.   Cardiovascular:      Rate and Rhythm: Normal rate and regular rhythm.      Pulses: Normal pulses.   Pulmonary:      Effort: Pulmonary effort is normal.      Breath sounds: Normal breath sounds.   Abdominal:      General: Abdomen is flat. Bowel sounds are normal.      Palpations: Abdomen is soft.      Tenderness: There is no abdominal tenderness.   Musculoskeletal:         General: Normal range of motion.      Cervical back: Normal range of motion.      Right lower leg: No edema.      Left lower leg: No edema.   Skin:     General: Skin is warm and dry.   Neurological:      General: No focal deficit present.      Mental Status: She is alert and oriented to person, place, and time. Mental status is at baseline.   Psychiatric:         Mood and Affect: Mood normal.         Behavior: Behavior normal.         Thought Content: Thought content normal.            Last Recorded Vitals  Blood pressure 97/74, pulse 69, temperature 36 °C (96.8 °F), resp. rate (!) 40, height 1.626 m (5' 4\"), weight 72.6 kg (160 lb), SpO2 97%.   "   Relevant Results     Scheduled medications    Scheduled Medications   ALPRAZolam, 0.25 mg, oral, Nightly  aspirin, 81 mg, oral, Daily  cycloSPORINE, 1 drop, Both Eyes, BID  doxazosin, 4 mg, oral, Nightly  famotidine, 40 mg, oral, Nightly  heparin (porcine), 5,000 Units, subcutaneous, q8h  levoFLOXacin, 750 mg, intravenous, q24h  levothyroxine, 75 mcg, oral, Once per day on Sunday Tuesday Wednesday Thursday Saturday  metoprolol succinate XL, 75 mg, oral, BID  perflutren lipid microspheres, 0.5-10 mL of dilution, intravenous, Once in imaging  perflutren protein A microsphere, 0.5 mL, intravenous, Once in imaging  polyethylene glycol, 17 g, oral, Daily  sertraline, 50 mg, oral, Daily  sulfur hexafluoride microsphr, 2 mL, intravenous, Once in imaging  valsartan, 320 mg, oral, Daily         Continuous medications  Continuous Medications         PRN medications  PRN Medications   PRN medications: acetaminophen **OR** acetaminophen **OR** acetaminophen, metoprolol, oxygen        CT angio head and neck w and wo IV contrast     Result Date: 12/8/2024  Interpreted By:  Mary Grace Byrd, STUDY: CT ANGIO HEAD AND NECK W AND WO IV CONTRAST;  12/8/2024 12:03 pm   INDICATION: Signs/Symptoms:vertigo, new a.fib, initial screening for cerebellar syndrome.     COMPARISON: Same day unenhanced head CT which is reported separately.   ACCESSION NUMBER(S): [id number]   ORDERING CLINICIAN: LUCIA ECHEVERRIA   TECHNIQUE: Subsequently, 90 ML of Omnipaque 350 was administered intravenously and axial images of the head and neck were acquired.  Coronal, sagittal, and 3-D reconstructions were provided for review.   3D reconstructions were performed utilizing independent software.   FINDINGS:     CTA HEAD FINDINGS:   Anterior circulation: There are diffuse calcifications of the intracranial segments of the internal carotid arteries. There appears to be at least mild narrowing bilaterally. There is subtle focal outpouching projecting inferomedially  from the communicating segment of the right ICA measuring less than 2 mm on image 115 of 430. The proximal anterior cerebral arteries are patent. There is subtle narrowing of the proximal M1 segment of the right MCA. There is mild irregularity and narrowing of M2 and more distal MCA branches bilaterally.   Posterior circulation: The V4 segment on the right is dominant. There is mild irregularity and narrowing on the left. The basilar artery is patent. Focal outpouching projecting superior laterally on the left from the basilar tip measuring approximately 2 mm on image 131 of 430 is favored to be related to an otherwise markedly diminutive P1 segment of the left PCA although an aneurysm could also give this appearance. Areas of irregularity and narrowing of the PCAs are suspected bilaterally.   CTA NECK FINDINGS:   Evaluation of the aortic arch and arch vessels is limited due to streak artifact from adjacent dense venous contrast. There are mild atherosclerotic changes of the visualized portion of the arch.   Carotid vessels:   The proximal common carotid arteries are degraded by artifact. They otherwise are patent. The carotid bifurcations demonstrate no measurable stenosis. There is tortuosity of the cervical ICAs bilaterally with subtle luminal irregularity but no measurable stenosis.   Vertebral vessels:   The cervical segments of the vertebral arteries are tortuous. There is subtle multifocal narrowing bilaterally due to impression from degenerative changes of the cervical spine.   Mucosal thickening and fluid are noted within the maxillary sinuses.   There are ground-glass opacities and irregular spiculated nodules in the right upper lobe, incompletely included on the obtained field-of-view but measuring up to at least 8-9 mm.   There is reversal of the normal cervical lordosis. There are multilevel degenerative changes of the cervical spine with associated central canal and neuroforaminal stenosis.          1.  There is multifocal irregularity and narrowing of the anterior and posterior circulation vasculature without proximal large branch vessel cutoff. Please note MRI with diffusion-weighted imaging would be a more sensitive means of assessing for acute ischemic injury. 2. Focal outpouching arising from the communicating segment of the right ICA could represent an infundibular origin of an otherwise very small, poorly seen vessel or sub 2 mm aneurysm. Apparent focal outpouching projecting anteriorly and to the left from the basilar tip may represent an otherwise markedly diminutive P1 segment of the left PCA although an aneurysm is not excluded. 3. Mild, multilevel narrowing of the cervical segments of the vertebral arteries due to impression from degenerative changes of the cervical spine. 4. Subtle luminal irregularity of the mid to distal cervical ICAs superimposed on vascular tortuosity is nonspecific but may be seen with FMD among other etiologies. There is no measurable stenosis. 5. Incompletely imaged irregular nodules in the right upper lobe with patchy ground-glass opacities are nonspecific and could be infectious or inflammatory in nature. Dedicated chest CT is recommended for further evaluation.   MACRO: None   Signed by: Mary Grace Byrd 12/8/2024 1:08 PM Dictation workstation:   [id number]     CT head wo IV contrast     Result Date: 12/8/2024  Interpreted By:  Gi Bloom, STUDY: CT HEAD WO IV CONTRAST;  12/8/2024 12:03 pm   INDICATION: Signs/Symptoms:dizziness.     COMPARISON: 05/24/2022   ACCESSION NUMBER(S): [id number]   ORDERING CLINICIAN: LUCIA ECHEVERRIA   TECHNIQUE: Unenhanced CT images of the head were obtained.   FINDINGS: The ventricles, cisterns and sulci are prominent, consistent with diffuse volume loss. There are areas of nonspecific white matter hypodensity, which are probably age related or microvascular in nature. These findings are similar to the prior exam. There is no acute intracranial  hemorrhage, mass effect or midline shift. No extraaxial fluid collection.   No focal calvarial lesion.   Mild left ethmoid and bilateral maxillary sinus mucosal thickening. Small amount of fluid in the left maxillary sinus.            No acute intracranial hemorrhage or mass-effect.   Multifocal sinus mucosal thickening and small amount of fluid in the left maxillary sinus.   MACRO: None.   Signed by: Gi Bloom 12/8/2024 12:18 PM Dictation workstation:   [id number]     XR chest 2 views     Result Date: 12/8/2024  Interpreted By:  Gi Bloom, STUDY: XR CHEST 2 VIEWS;  12/8/2024 10:28 am   INDICATION: Signs/Symptoms:recent chest infection, burning in chest.     COMPARISON: 12/12/2019   ACCESSION NUMBER(S): [id number]   ORDERING CLINICIAN: LUCIA ECHEVERRIA   FINDINGS: Artifact from overlying monitoring leads noted. Questionable retrocardiac infiltrate versus confluence of shadows. No pleural effusion or pneumothorax. The cardiac silhouette is upper limits of normal in size status post sternotomy.        Questionable retrocardiac infiltrate. Clinical correlation and follow-up recommended.   MACRO: None.   Signed by: Gi Bloom 12/8/2024 10:38 AM Dictation workstation:   [id number]            Results for orders placed or performed during the hospital encounter of 12/08/24 (from the past 24 hours)   CBC and Auto Differential   Result Value Ref Range     WBC 20.4 (H) 4.4 - 11.3 x10*3/uL     nRBC 0.0 0.0 - 0.0 /100 WBCs     RBC 4.40 4.00 - 5.20 x10*6/uL     Hemoglobin 13.6 12.0 - 16.0 g/dL     Hematocrit 40.8 36.0 - 46.0 %     MCV 93 80 - 100 fL     MCH 30.9 26.0 - 34.0 pg     MCHC 33.3 32.0 - 36.0 g/dL     RDW 13.2 11.5 - 14.5 %     Platelets 308 150 - 450 x10*3/uL     Neutrophils % 83.8 40.0 - 80.0 %     Immature Granulocytes %, Automated 1.1 (H) 0.0 - 0.9 %     Lymphocytes % 8.7 13.0 - 44.0 %     Monocytes % 6.0 2.0 - 10.0 %     Eosinophils % 0.1 0.0 - 6.0 %     Basophils % 0.3 0.0 - 2.0 %     Neutrophils  Absolute 17.07 (H) 1.60 - 5.50 x10*3/uL     Immature Granulocytes Absolute, Automated 0.22 0.00 - 0.50 x10*3/uL     Lymphocytes Absolute 1.77 0.80 - 3.00 x10*3/uL     Monocytes Absolute 1.23 (H) 0.05 - 0.80 x10*3/uL     Eosinophils Absolute 0.03 0.00 - 0.40 x10*3/uL     Basophils Absolute 0.06 0.00 - 0.10 x10*3/uL   Comprehensive metabolic panel   Result Value Ref Range     Glucose 127 (H) 74 - 99 mg/dL     Sodium 137 136 - 145 mmol/L     Potassium 4.0 3.5 - 5.3 mmol/L     Chloride 101 98 - 107 mmol/L     Bicarbonate 26 21 - 32 mmol/L     Anion Gap 14 10 - 20 mmol/L     Urea Nitrogen 18 6 - 23 mg/dL     Creatinine 1.09 (H) 0.50 - 1.05 mg/dL     eGFR 52 (L) >60 mL/min/1.73m*2     Calcium 10.9 (H) 8.6 - 10.3 mg/dL     Albumin 3.9 3.4 - 5.0 g/dL     Alkaline Phosphatase 77 33 - 136 U/L     Total Protein 7.6 6.4 - 8.2 g/dL     AST 15 9 - 39 U/L     Bilirubin, Total 0.7 0.0 - 1.2 mg/dL     ALT 13 7 - 45 U/L   B-Type Natriuretic Peptide   Result Value Ref Range      (H) 0 - 99 pg/mL   Troponin I, High Sensitivity, Initial   Result Value Ref Range     Troponin I, High Sensitivity 12 0 - 13 ng/L   TSH with reflex to Free T4 if abnormal   Result Value Ref Range     Thyroid Stimulating Hormone 3.03 0.44 - 3.98 mIU/L   Magnesium   Result Value Ref Range     Magnesium 1.88 1.60 - 2.40 mg/dL   BLOOD GAS VENOUS FULL PANEL   Result Value Ref Range     POCT pH, Venous 7.38 7.33 - 7.43 pH     POCT pCO2, Venous 46 41 - 51 mm Hg     POCT pO2, Venous 17 (L) 35 - 45 mm Hg     POCT SO2, Venous 24 (L) 45 - 75 %     POCT Oxy Hemoglobin, Venous 23.9 (L) 45.0 - 75.0 %     POCT Hematocrit Calculated, Venous 38.0 36.0 - 46.0 %     POCT Sodium, Venous 137 136 - 145 mmol/L     POCT Potassium, Venous 4.4 3.5 - 5.3 mmol/L     POCT Chloride, Venous 100 98 - 107 mmol/L     POCT Ionized Calicum, Venous 1.37 (H) 1.10 - 1.33 mmol/L     POCT Glucose, Venous 130 (H) 74 - 99 mg/dL     POCT Lactate, Venous 1.9 0.4 - 2.0 mmol/L     POCT Base Excess,  Venous 1.6 -2.0 - 3.0 mmol/L     POCT HCO3 Calculated, Venous 27.2 (H) 22.0 - 26.0 mmol/L     POCT Hemoglobin, Venous 12.6 12.0 - 16.0 g/dL     POCT Anion Gap, Venous 14.0 10.0 - 25.0 mmol/L     Patient Temperature 37.0 degrees Celsius     FiO2 21 %   Lactate   Result Value Ref Range     Lactate 1.7 0.4 - 2.0 mmol/L   Coagulation Screen   Result Value Ref Range     Protime 15.8 (H) 9.8 - 12.8 seconds     INR 1.4 (H) 0.9 - 1.1     aPTT 26 (L) 27 - 38 seconds   Troponin, High Sensitivity, 1 Hour   Result Value Ref Range     Troponin I, High Sensitivity 61 (HH) 0 - 13 ng/L               Assessment/Plan     Assessment & Plan  Atrial fibrillation with RVR (Multi)     Atrial fibrillation with RVR  Elevated high-sensitivity troponins  Elevated BNP  History of diastolic heart failure  CABG x 4 (2019)  -Cardiology consult and recommendations appreciated  -Patient found to be in atrial fibrillation with RVR, new onset in the ED.  EKG, per ED physician, shows 134 bpm, atrial fibrillation, normal axis, QRS 84, QTc 471, intermittent elevations in lead III less than 1 mm, otherwise normal ST and T wave pattern with no evidence of acute ischemia or other acute findings  -Patient initially given 5 mg IV Lopressor which was unsuccessful, patient then given 250 mcg of digoxin per Dr. Helton.   -Patient in NSR at the time of my interview with HR in the 70's  -Last echo from 2019, echo added on  -Patient on 75 mg metoprolol 2 times daily at home, continue.  Adjustments per cardiology.  IV Lopressor as needed  -Continue home aspirin, doxazosin  -[id number] score 6, initiation of anticoagulation per cardiology/attending     Pneumonia  Shortness of breath  Leukocytosis  -Chest x-ray shows questionable retrocardiac infiltrate  -Will empirically treat for pneumonia, given patient states she has been sick with a “ chest infection” since last Tuesday and was recently on steroids/antibiotics.  Patient also with leukocytosis  -Continue IV  levofloxacin given allergies to amoxicillin, erythromycin, cephalexin  -DuoNebs and albuterol as needed, oxygen as needed  -Strep pneumo and Legionella antigen, Pro-Yehuda added on and pending  -Following blood cultures and respiratory cultures     Acute kidney injury  Hypercalcemia  -Creatinine 1.09 today, 0.87 on 11/16  -Calcium 10.9 today, hold calcium supplements  -Avoid nephrotoxic medications, monitor with BMP     GERD  -Continue home medications as ordered     DVT prophylaxis  -Heparin  -SCDs        I spent 65 minutes in the professional and overall care of this patient.  Patient fully evaluated and plan as above     Chikis Barone PA-C                 Revision History       Patient fully evaluated  12/09  for Principal Problem:    Atrial fibrillation with RVR (Multi)  Active Problems:    Elevated troponin    Patient seen resting in bed with head of bed elevated, no s/s or c/o acute difficulties at this time.  Vital signs for last 24 hours Temp:  [36 °C (96.8 °F)-36.6 °C (97.9 °F)] 36.2 °C (97.2 °F)  Heart Rate:  [68-74] 68  Resp:  [15-16] 15  BP: (129-182)/(61-78) 167/78    No intake/output data recorded.  Patient still requiring frequent cardiac and SPO2 monitoring. Continue aggressive pulmonary hygiene and oral hygiene. Off loading as tolerated for skin integrity. Medications and labs reviewed-   Results for orders placed or performed during the hospital encounter of 12/08/24 (from the past 24 hours)   Troponin I, High Sensitivity, Initial   Result Value Ref Range    Troponin I, High Sensitivity 5,529 (HH) 0 - 13 ng/L   Procalcitonin   Result Value Ref Range    Procalcitonin 0.77 (H) <=0.07 ng/mL   SST TOP   Result Value Ref Range    Extra Tube Hold for add-ons.    Troponin, High Sensitivity, 1 Hour   Result Value Ref Range    Troponin I, High Sensitivity 8,818 (HH) 0 - 13 ng/L   Urinalysis with Reflex Culture and Microscopic   Result Value Ref Range    Color, Urine Yellow Light-Yellow, Yellow, Dark-Yellow     Appearance, Urine Clear Clear    Specific Gravity, Urine >1.050 (N) 1.005 - 1.035    pH, Urine 5.5 5.0, 5.5, 6.0, 6.5, 7.0, 7.5, 8.0    Protein, Urine 20 (TRACE) NEGATIVE, 10 (TRACE), 20 (TRACE) mg/dL    Glucose, Urine Normal Normal mg/dL    Blood, Urine 0.03 (TRACE) (A) NEGATIVE    Ketones, Urine NEGATIVE NEGATIVE mg/dL    Bilirubin, Urine NEGATIVE NEGATIVE    Urobilinogen, Urine Normal Normal mg/dL    Nitrite, Urine NEGATIVE NEGATIVE    Leukocyte Esterase, Urine NEGATIVE NEGATIVE   Extra Urine Gray Tube   Result Value Ref Range    Extra Tube Hold for add-ons.    Urinalysis Microscopic   Result Value Ref Range    WBC, Urine NONE 1-5, NONE /HPF    RBC, Urine 1-2 NONE, 1-2, 3-5 /HPF    Squamous Epithelial Cells, Urine 1-9 (SPARSE) Reference range not established. /HPF    Bacteria, Urine 1+ (A) NONE SEEN /HPF    Mucus, Urine 3+ Reference range not established. /LPF   CBC   Result Value Ref Range    WBC 15.2 (H) 4.4 - 11.3 x10*3/uL    nRBC 0.0 0.0 - 0.0 /100 WBCs    RBC 3.97 (L) 4.00 - 5.20 x10*6/uL    Hemoglobin 11.8 (L) 12.0 - 16.0 g/dL    Hematocrit 37.6 36.0 - 46.0 %    MCV 95 80 - 100 fL    MCH 29.7 26.0 - 34.0 pg    MCHC 31.4 (L) 32.0 - 36.0 g/dL    RDW 13.4 11.5 - 14.5 %    Platelets 320 150 - 450 x10*3/uL   Basic metabolic panel   Result Value Ref Range    Glucose 89 74 - 99 mg/dL    Sodium 138 136 - 145 mmol/L    Potassium 4.0 3.5 - 5.3 mmol/L    Chloride 103 98 - 107 mmol/L    Bicarbonate 29 21 - 32 mmol/L    Anion Gap 10 10 - 20 mmol/L    Urea Nitrogen 22 6 - 23 mg/dL    Creatinine 0.96 0.50 - 1.05 mg/dL    eGFR 61 >60 mL/min/1.73m*2    Calcium 9.8 8.6 - 10.3 mg/dL      Patient recently received an antibiotic (last 12 hours)       None           Pt awake, conversant, and A&O x 3. Pt's heart rate was in the 90's upon examination. Pt stated that she has been experiencing SOB and labored breathing. Today's labs read a glucose of 89, sodium of 138, WBC of 15.2, RBC of 3.97, hemoglobin of 11.8, and hematocrit of  37.6. Cardiology was consulted and provided their input. Still awaiting the results of the echocardiogram which will help determine if a heart cath is needed. Pt's metoprolol has been increased to 100 mg to help better manage her atrial fibrillation. Pt is currently taking Xarelto 20 mg daily for protection against thromboembolism. As per pharmacy - her levofloxacin 750 mg IV q24hrs was adjusted to 750 mg IV q48hrs to better treat her pneumonia and is better for her renal function. Plan discussed with interdisciplinary team, continue current and repeat labs in the AM.     Discharge planning discussed with patient and care team. Therapy evaluations ordered.  Patient aware and agreeable to current plan, continue plan as above.     I spent a total of 50 minutes on the date of the service which included preparing to see the patient, face-to-face patient care, completing clinical documentation, obtaining and/or reviewing separately obtained history, performing a medically appropriate examination, counseling and educating the patient/family/caregiver, ordering medications, tests, or procedures, communicating with other HCPs (not separately reported), independently interpreting results (not separately reported), communicating results to the patient/family/caregiver, and care coordination (not separately reported).           IZABELLA LO, Student PA

## 2024-12-10 ENCOUNTER — APPOINTMENT (OUTPATIENT)
Dept: CARDIOLOGY | Facility: CLINIC | Age: 79
End: 2024-12-10
Payer: MEDICARE

## 2024-12-10 LAB
ALBUMIN SERPL BCP-MCNC: 3.1 G/DL (ref 3.4–5)
ALP SERPL-CCNC: 57 U/L (ref 33–136)
ALT SERPL W P-5'-P-CCNC: 10 U/L (ref 7–45)
ANION GAP SERPL CALC-SCNC: 12 MMOL/L (ref 10–20)
AST SERPL W P-5'-P-CCNC: 17 U/L (ref 9–39)
ATRIAL RATE: 67 BPM
BILIRUB SERPL-MCNC: 0.5 MG/DL (ref 0–1.2)
BUN SERPL-MCNC: 18 MG/DL (ref 6–23)
CALCIUM SERPL-MCNC: 9.4 MG/DL (ref 8.6–10.3)
CHLORIDE SERPL-SCNC: 103 MMOL/L (ref 98–107)
CO2 SERPL-SCNC: 26 MMOL/L (ref 21–32)
CREAT SERPL-MCNC: 0.9 MG/DL (ref 0.5–1.05)
EGFRCR SERPLBLD CKD-EPI 2021: 66 ML/MIN/1.73M*2
ERYTHROCYTE [DISTWIDTH] IN BLOOD BY AUTOMATED COUNT: 13.2 % (ref 11.5–14.5)
GLUCOSE BLD MANUAL STRIP-MCNC: 95 MG/DL (ref 74–99)
GLUCOSE SERPL-MCNC: 102 MG/DL (ref 74–99)
HCT VFR BLD AUTO: 37.6 % (ref 36–46)
HGB BLD-MCNC: 12 G/DL (ref 12–16)
MCH RBC QN AUTO: 29.5 PG (ref 26–34)
MCHC RBC AUTO-ENTMCNC: 31.9 G/DL (ref 32–36)
MCV RBC AUTO: 92 FL (ref 80–100)
NRBC BLD-RTO: 0 /100 WBCS (ref 0–0)
P AXIS: 58 DEGREES
P OFFSET: 193 MS
P ONSET: 136 MS
PLATELET # BLD AUTO: 325 X10*3/UL (ref 150–450)
POTASSIUM SERPL-SCNC: 4.1 MMOL/L (ref 3.5–5.3)
PR INTERVAL: 164 MS
PROT SERPL-MCNC: 6 G/DL (ref 6.4–8.2)
Q ONSET: 218 MS
Q ONSET: 220 MS
QRS COUNT: 10 BEATS
QRS COUNT: 22 BEATS
QRS DURATION: 78 MS
QRS DURATION: 84 MS
QT INTERVAL: 316 MS
QT INTERVAL: 388 MS
QTC CALCULATION(BAZETT): 409 MS
QTC CALCULATION(BAZETT): 471 MS
QTC FREDERICIA: 402 MS
QTC FREDERICIA: 412 MS
R AXIS: 12 DEGREES
R AXIS: 21 DEGREES
RBC # BLD AUTO: 4.07 X10*6/UL (ref 4–5.2)
SODIUM SERPL-SCNC: 137 MMOL/L (ref 136–145)
T AXIS: 149 DEGREES
T AXIS: 29 DEGREES
T OFFSET: 378 MS
T OFFSET: 412 MS
UFH PPP CHRO-ACNC: 0.2 IU/ML
UFH PPP CHRO-ACNC: 0.4 IU/ML
UFH PPP CHRO-ACNC: 0.7 IU/ML
VENTRICULAR RATE: 134 BPM
VENTRICULAR RATE: 67 BPM
WBC # BLD AUTO: 12 X10*3/UL (ref 4.4–11.3)

## 2024-12-10 PROCEDURE — 2500000002 HC RX 250 W HCPCS SELF ADMINISTERED DRUGS (ALT 637 FOR MEDICARE OP, ALT 636 FOR OP/ED)

## 2024-12-10 PROCEDURE — 82947 ASSAY GLUCOSE BLOOD QUANT: CPT

## 2024-12-10 PROCEDURE — 93459 L HRT ART/GRFT ANGIO: CPT | Performed by: STUDENT IN AN ORGANIZED HEALTH CARE EDUCATION/TRAINING PROGRAM

## 2024-12-10 PROCEDURE — 99152 MOD SED SAME PHYS/QHP 5/>YRS: CPT | Performed by: STUDENT IN AN ORGANIZED HEALTH CARE EDUCATION/TRAINING PROGRAM

## 2024-12-10 PROCEDURE — 85520 HEPARIN ASSAY: CPT | Performed by: INTERNAL MEDICINE

## 2024-12-10 PROCEDURE — 36415 COLL VENOUS BLD VENIPUNCTURE: CPT | Performed by: INTERNAL MEDICINE

## 2024-12-10 PROCEDURE — 7100000009 HC PHASE TWO TIME - INITIAL BASE CHARGE: Performed by: STUDENT IN AN ORGANIZED HEALTH CARE EDUCATION/TRAINING PROGRAM

## 2024-12-10 PROCEDURE — 85027 COMPLETE CBC AUTOMATED: CPT | Performed by: INTERNAL MEDICINE

## 2024-12-10 PROCEDURE — 2550000001 HC RX 255 CONTRASTS: Performed by: STUDENT IN AN ORGANIZED HEALTH CARE EDUCATION/TRAINING PROGRAM

## 2024-12-10 PROCEDURE — 84075 ASSAY ALKALINE PHOSPHATASE: CPT | Performed by: INTERNAL MEDICINE

## 2024-12-10 PROCEDURE — 99153 MOD SED SAME PHYS/QHP EA: CPT | Performed by: STUDENT IN AN ORGANIZED HEALTH CARE EDUCATION/TRAINING PROGRAM

## 2024-12-10 PROCEDURE — B2111ZZ FLUOROSCOPY OF MULTIPLE CORONARY ARTERIES USING LOW OSMOLAR CONTRAST: ICD-10-PCS | Performed by: NURSE PRACTITIONER

## 2024-12-10 PROCEDURE — 2500000001 HC RX 250 WO HCPCS SELF ADMINISTERED DRUGS (ALT 637 FOR MEDICARE OP)

## 2024-12-10 PROCEDURE — C1729 CATH, DRAINAGE: HCPCS | Performed by: STUDENT IN AN ORGANIZED HEALTH CARE EDUCATION/TRAINING PROGRAM

## 2024-12-10 PROCEDURE — B2151ZZ FLUOROSCOPY OF LEFT HEART USING LOW OSMOLAR CONTRAST: ICD-10-PCS | Performed by: NURSE PRACTITIONER

## 2024-12-10 PROCEDURE — 4A023N7 MEASUREMENT OF CARDIAC SAMPLING AND PRESSURE, LEFT HEART, PERCUTANEOUS APPROACH: ICD-10-PCS | Performed by: NURSE PRACTITIONER

## 2024-12-10 PROCEDURE — 1200000002 HC GENERAL ROOM WITH TELEMETRY DAILY

## 2024-12-10 PROCEDURE — 2500000001 HC RX 250 WO HCPCS SELF ADMINISTERED DRUGS (ALT 637 FOR MEDICARE OP): Performed by: INTERNAL MEDICINE

## 2024-12-10 PROCEDURE — 2500000001 HC RX 250 WO HCPCS SELF ADMINISTERED DRUGS (ALT 637 FOR MEDICARE OP): Performed by: STUDENT IN AN ORGANIZED HEALTH CARE EDUCATION/TRAINING PROGRAM

## 2024-12-10 PROCEDURE — 2500000004 HC RX 250 GENERAL PHARMACY W/ HCPCS (ALT 636 FOR OP/ED)

## 2024-12-10 PROCEDURE — 7100000010 HC PHASE TWO TIME - EACH INCREMENTAL 1 MINUTE: Performed by: STUDENT IN AN ORGANIZED HEALTH CARE EDUCATION/TRAINING PROGRAM

## 2024-12-10 PROCEDURE — 2720000007 HC OR 272 NO HCPCS: Performed by: STUDENT IN AN ORGANIZED HEALTH CARE EDUCATION/TRAINING PROGRAM

## 2024-12-10 PROCEDURE — 2500000004 HC RX 250 GENERAL PHARMACY W/ HCPCS (ALT 636 FOR OP/ED): Performed by: STUDENT IN AN ORGANIZED HEALTH CARE EDUCATION/TRAINING PROGRAM

## 2024-12-10 PROCEDURE — 99233 SBSQ HOSP IP/OBS HIGH 50: CPT | Performed by: STUDENT IN AN ORGANIZED HEALTH CARE EDUCATION/TRAINING PROGRAM

## 2024-12-10 PROCEDURE — C1894 INTRO/SHEATH, NON-LASER: HCPCS | Performed by: STUDENT IN AN ORGANIZED HEALTH CARE EDUCATION/TRAINING PROGRAM

## 2024-12-10 RX ORDER — ATORVASTATIN CALCIUM 40 MG/1
40 TABLET, FILM COATED ORAL NIGHTLY
Status: DISCONTINUED | OUTPATIENT
Start: 2024-12-10 | End: 2024-12-12 | Stop reason: HOSPADM

## 2024-12-10 RX ORDER — ONDANSETRON HYDROCHLORIDE 2 MG/ML
4 INJECTION, SOLUTION INTRAVENOUS EVERY 4 HOURS PRN
Status: DISCONTINUED | OUTPATIENT
Start: 2024-12-10 | End: 2024-12-12 | Stop reason: HOSPADM

## 2024-12-10 RX ORDER — MIDAZOLAM HYDROCHLORIDE 1 MG/ML
INJECTION, SOLUTION INTRAMUSCULAR; INTRAVENOUS AS NEEDED
Status: DISCONTINUED | OUTPATIENT
Start: 2024-12-10 | End: 2024-12-10 | Stop reason: HOSPADM

## 2024-12-10 RX ORDER — LIDOCAINE HYDROCHLORIDE 20 MG/ML
INJECTION, SOLUTION INFILTRATION; PERINEURAL AS NEEDED
Status: DISCONTINUED | OUTPATIENT
Start: 2024-12-10 | End: 2024-12-10 | Stop reason: HOSPADM

## 2024-12-10 RX ORDER — FENTANYL CITRATE 50 UG/ML
INJECTION, SOLUTION INTRAMUSCULAR; INTRAVENOUS AS NEEDED
Status: DISCONTINUED | OUTPATIENT
Start: 2024-12-10 | End: 2024-12-10 | Stop reason: HOSPADM

## 2024-12-10 RX ADMIN — ACETAMINOPHEN 650 MG: 325 TABLET, FILM COATED ORAL at 21:34

## 2024-12-10 RX ADMIN — DOXAZOSIN 4 MG: 2 TABLET ORAL at 21:34

## 2024-12-10 RX ADMIN — ASPIRIN 81 MG CHEWABLE TABLET 81 MG: 81 TABLET CHEWABLE at 09:32

## 2024-12-10 RX ADMIN — ONDANSETRON 4 MG: 2 INJECTION INTRAMUSCULAR; INTRAVENOUS at 03:23

## 2024-12-10 RX ADMIN — ALPRAZOLAM 0.25 MG: 0.25 TABLET ORAL at 21:34

## 2024-12-10 RX ADMIN — METOPROLOL SUCCINATE 100 MG: 50 TABLET, EXTENDED RELEASE ORAL at 09:33

## 2024-12-10 RX ADMIN — CYCLOSPORINE 1 DROP: 0.5 EMULSION OPHTHALMIC at 09:33

## 2024-12-10 RX ADMIN — METOPROLOL SUCCINATE 100 MG: 50 TABLET, EXTENDED RELEASE ORAL at 21:34

## 2024-12-10 RX ADMIN — FAMOTIDINE 40 MG: 20 TABLET, FILM COATED ORAL at 21:34

## 2024-12-10 RX ADMIN — ONDANSETRON 4 MG: 2 INJECTION INTRAMUSCULAR; INTRAVENOUS at 10:57

## 2024-12-10 RX ADMIN — CYCLOSPORINE 1 DROP: 0.5 EMULSION OPHTHALMIC at 21:36

## 2024-12-10 RX ADMIN — LEVOFLOXACIN 750 MG: 750 INJECTION, SOLUTION INTRAVENOUS at 09:41

## 2024-12-10 RX ADMIN — CLOPIDOGREL BISULFATE 75 MG: 75 TABLET ORAL at 09:32

## 2024-12-10 RX ADMIN — SERTRALINE HYDROCHLORIDE 50 MG: 50 TABLET ORAL at 09:32

## 2024-12-10 RX ADMIN — LEVOTHYROXINE SODIUM 75 MCG: 0.07 TABLET ORAL at 06:18

## 2024-12-10 RX ADMIN — ATORVASTATIN CALCIUM 40 MG: 40 TABLET, FILM COATED ORAL at 21:34

## 2024-12-10 RX ADMIN — VALSARTAN 320 MG: 80 TABLET, FILM COATED ORAL at 09:32

## 2024-12-10 ASSESSMENT — COGNITIVE AND FUNCTIONAL STATUS - GENERAL
CLIMB 3 TO 5 STEPS WITH RAILING: A LITTLE
DAILY ACTIVITIY SCORE: 24
MOBILITY SCORE: 23

## 2024-12-10 ASSESSMENT — PAIN SCALES - GENERAL
PAINLEVEL_OUTOF10: 0 - NO PAIN

## 2024-12-10 NOTE — PROGRESS NOTES
Kristin Martines is a 79 y.o. female on day 2 of admission presenting with Atrial fibrillation with RVR (Multi).      Subjective     Pt awake, conversant, and A&O x 3. Pt's heart rate was in the 90's upon examination. Pt stated that she has been experiencing SOB and labored breathing. Today's labs read a glucose of 89, sodium of 138, WBC of 15.2, RBC of 3.97, hemoglobin of 11.8, and hematocrit of 37.6. Cardiology was consulted and provided their input. Still awaiting the results of the echocardiogram which will help determine if a heart cath is needed. Pt's metoprolol has been increased to 100 mg to help better manage her atrial fibrillation. Pt is currently taking Xarelto 20 mg daily for protection against thromboembolism. As per pharmacy - her levofloxacin 750 mg IV q24hrs was adjusted to 750 mg IV q48hrs to better treat her pneumonia and is better for her renal function. Plan discussed with interdisciplinary team, continue current and repeat labs in the AM.     Discharge planning discussed with patient and care team. Therapy evaluations ordered.  Patient aware and agreeable to current plan, continue plan as above.     I spent a total of 50 minutes on the date of the service which included preparing to see the patient, face-to-face patient care, completing clinical documentation, obtaining and/or reviewing separately obtained history, performing a medically appropriate examination, counseling and educating the patient/family/caregiver, ordering medications, tests, or procedures, communicating with other HCPs (not separately reported), independently interpreting results (not separately reported), communicating results to the patient/family/caregiver, and care coordination (not separately reported).          Objective     Last Recorded Vitals  /67   Pulse 72   Temp 36 °C (96.8 °F)   Resp 15   Wt 73.8 kg (162 lb 11.2 oz)   SpO2 92%   Intake/Output last 3 Shifts:  No intake or output data in the 24 hours  ending 12/10/24 1342      Admission Weight  Weight: 72.6 kg (160 lb) (12/08/24 1000)    Daily Weight  12/09/24 : 73.8 kg (162 lb 11.2 oz)    Image Results  Transthoracic Echo (TTE) Complete     [facility], [address] [place] [address]            Tel [phone] and Fax [phone]    TRANSTHORACIC ECHOCARDIOGRAM REPORT       Patient Name:       DOUGLAS MELENDEZ    Reading Physician:    [id number] Lambert Elizabeth MD  Study Date:         12/9/2024           Ordering Provider:    [id number] CARMEN MENDENHALL  MRN/PID:            [id number]            Fellow:  Accession#:         [id number]        Nurse:  Date of Birth/Age:  1945 / 79     Sonographer:          Robb scott RDCS  Gender assigned at  F                   Additional Staff:  Birth:  Height:             162.00 cm           Admit Date:           12/8/2024  Weight:             73.01 kg            Admission Status:     Inpatient -                                                                Routine  BSA / BMI:          1.78 m2 / 27.82     Encounter#:           [id number]                      kg/m2  Blood Pressure:     182/77 mmHg         Department Location:  [address]                                                                [facility] [place] [facility]    Study Type:    TRANSTHORACIC ECHO (TTE) COMPLETE  Diagnosis/ICD: Abnormal electrocardiogram [ECG] [EKG]-R94.31; Paroxysmal atrial                 fibrillation-I48.0; Unspecified atrial fibrillation-I48.91  Indication:    Abnormal EKG, Atrial Fibrillation  CPT Code:      Echo Complete w Full Doppler-97921    Patient History:  Pertinent History: PMH diastolic heart failure, CABG x 4 in 2019, HLD,                     palpitations, GERD presenting with concern for generalized                     weakness.    Study  Detail: The following Echo studies were performed: 2D, M-Mode, Doppler,                color flow and 3D. Technically challenging study due to body                habitus.       PHYSICIAN INTERPRETATION:  Left Ventricle: Left ventricular ejection fraction is normal, by visual estimate at 55-60%. There are no regional left ventricular wall motion abnormalities. The left ventricular cavity size is normal. There is mildly increased septal and mildly increased posterior left ventricular wall thickness. There is left ventricular concentric remodeling. Spectral Doppler shows a Grade II (pseudonormal pattern) of left ventricular diastolic filling with an elevated left atrial pressure.  Left Atrium: The left atrium is mildly dilated.  Right Ventricle: The right ventricle is normal in size. There is normal right ventricular global systolic function.  Right Atrium: The right atrium is normal in size.  Aortic Valve: The aortic valve is trileaflet. There is mild aortic valve thickening. There is evidence of mildly elevated transaortic gradients consistent with sclerosis of the aortic valve. The aortic valve dimensionless index is 0.69. There is trace aortic valve regurgitation. The peak instantaneous gradient of the aortic valve is 9 mmHg. The mean gradient of the aortic valve is 6 mmHg.  Mitral Valve: The mitral valve is mildly thickened. The peak instantaneous gradient of the mitral valve is 3 mmHg. There is mild mitral valve regurgitation.  Tricuspid Valve: The tricuspid valve is structurally normal. There is trace to mild tricuspid regurgitation. The Doppler estimated RVSP is mildly elevated at 32.8 mmHg.  Pulmonic Valve: The pulmonic valve is structurally normal. There is trace pulmonic valve regurgitation.  Pericardium: There is no pericardial effusion noted.  Aorta: The aortic root is normal.  Systemic Veins: The inferior vena cava appears normal in size.       CONCLUSIONS:   1. Left ventricular ejection fraction is  normal, by visual estimate at 55-60%.   2. Spectral Doppler shows a Grade II (pseudonormal pattern) of left ventricular diastolic filling with an elevated left atrial pressure.   3. There is normal right ventricular global systolic function.   4. The left atrium is mildly dilated.   5. Mildly elevated right ventricular systolic pressure.   6. Aortic valve sclerosis.    QUANTITATIVE DATA SUMMARY:     2D MEASUREMENTS:          Normal Ranges:  LAs:             5.00 cm  (2.7-4.0cm)  IVSd:            1.10 cm  (0.6-1.1cm)  LVPWd:           1.10 cm  (0.6-1.1cm)  LVIDd:           3.90 cm  (3.9-5.9cm)  LVIDs:           2.70 cm  LV Mass Index:   79 g/m2  LVEDV Index:     40 ml/m2  LV % FS          30.8 %       LA VOLUME:                    Normal Ranges:  LA Vol A4C:        48.7 ml    (22+/-6mL/m2)  LA Vol A2C:        62.1 ml  LA Vol BP:         55.0 ml  LA Vol Index A4C:  27.4ml/m2  LA Vol Index A2C:  34.9 ml/m2  LA Vol Index BP:   30.9 ml/m2  LA Area A4C:       18.4 cm2  LA Area A2C:       20.8 cm2  LA Major Axis A4C: 5.9 cm  LA Major Axis A2C: 5.9 cm  LA Volume Index:   30.9 ml/m2       RA VOLUME BY A/L METHOD:            Normal Ranges:  RA Vol A4C:              37.0 ml    (8.3-19.5ml)  RA Vol Index A4C:        20.8 ml/m2  RA Area A4C:             16.2 cm2  RA Major Axis A4C:       6.0 cm       M-MODE MEASUREMENTS:         Normal Ranges:  Ao Root:             2.70 cm (2.0-3.7cm)  LAs:                 5.30 cm (2.7-4.0cm)       AORTA MEASUREMENTS:         Normal Ranges:  Ao Sinus, d:        2.70 cm (2.1-3.5cm)  Ao STJ, d:          2.30 cm (1.7-3.4cm)  Asc Ao, d:          3.30 cm (2.1-3.4cm)       LV SYSTOLIC FUNCTION BY 2D PLANIMETRY (MOD):                       Normal Ranges:  EF-A4C View:    65 % (>=55%)  EF-A2C View:    57 %  EF-Biplane:     61 %  EF-Visual:      58 %  LV EF Reported: 58 %       LV DIASTOLIC FUNCTION:             Normal Ranges:  MV Peak E:             0.99 m/s    (0.7-1.2 m/s)  MV Peak A:             0.65  m/s    (0.42-0.7 m/s)  E/A Ratio:             1.52        (1.0-2.2)  MV e'                  0.086 m/s   (>8.0)  MV lateral e'          0.12 m/s  MV medial e'           0.05 m/s  MV A Dur:              140.00 msec  E/e' Ratio:            11.52       (<8.0)  PulmV Sys Gasper:         46.40 cm/s  PulmV Sanchez Gasper:        67.10 cm/s  PulmV S/D Gasepr:         0.70  PulmV A Revs Gasper:      23.20 cm/s  PulmV A Revs Dur:      87.00 msec       MITRAL VALVE:          Normal Ranges:  MV Vmax:      0.88 m/s (<=1.3m/s)  MV peak PG:   3.1 mmHg (<5mmHg)  MV mean P.0 mmHg (<2mmHg)  MV DT:        142 msec (150-240msec)       AORTIC VALVE:                     Normal Ranges:  AoV Vmax:                1.46 m/s (<=1.7m/s)  AoV Peak P.5 mmHg (<20mmHg)  AoV Mean P.0 mmHg (1.7-11.5mmHg)  LVOT Max Gasper:            1.12 m/s (<=1.1m/s)  AoV VTI:                 32.10 cm (18-25cm)  LVOT VTI:                22.20 cm  LVOT Diameter:           1.90 cm  (1.8-2.4cm)  AoV Area, VTI:           1.96 cm2 (2.5-5.5cm2)  AoV Area,Vmax:           2.18 cm2 (2.5-4.5cm2)  AoV Dimensionless Index: 0.69       RIGHT VENTRICLE:  RV Basal 2.70 cm  RV Mid   1.90 cm  RV Major 6.8 cm  TAPSE:   19.2 mm  RV s'    0.09 m/s       TRICUSPID VALVE/RVSP:          Normal Ranges:  Peak TR Velocity:     2.73 m/s  Est. RA Pressure:     3 mmHg  RV Syst Pressure:     33 mmHg  (< 30mmHg)  IVC Diam:             1.47 cm       PULMONIC VALVE:          Normal Ranges:  PV Max Gasper:     0.9 m/s  (0.6-0.9m/s)  PV Max PG:      3.2 mmHg       Pulmonary Veins:  PulmV A Revs Dur: 87.00 msec  PulmV A Revs Gasper: 23.20 cm/s  PulmV Sanchez Gasper:   67.10 cm/s  PulmV S/D Gasper:    0.70  PulmV Sys Gasper:    46.40 cm/s       [id number] Lambert Elizabeth MD  Electronically signed on 2024 at 3:29:31 PM       ** Final **  Electrocardiogram, 12-lead PRN ACS symptoms  Atrial fibrillation with rapid ventricular response  Marked ST abnormality, possible lateral subendocardial  injury  Abnormal ECG  When compared with ECG of 12-NOV-2024 11:24,  Atrial fibrillation has replaced Sinus rhythm  Vent. rate has increased BY  79 BPM  ST now depressed in Lateral leads  T wave inversion now evident in Lateral leads  ECG 12 lead  Sinus rhythm with Premature atrial complexes with Aberrant conduction  Minimal voltage criteria for LVH, may be normal variant ( R in aVL )  Borderline ECG  When compared with ECG of 08-DEC-2024 10:11, (unconfirmed)  Sinus rhythm has replaced Atrial fibrillation  Vent. rate has decreased BY  67 BPM  T wave inversion no longer evident in Lateral leads  XR chest 2 views  Narrative: Interpreted By:  Cal Noyola,   STUDY:  XR CHEST 2 VIEWS;  12/9/2024 9:47 am      INDICATION:  Signs/Symptoms:PNA.      COMPARISON:  12/08/2024 chest x-ray      ACCESSION NUMBER(S):  [id number]      ORDERING CLINICIAN:  CARMEN MENDENHALL      FINDINGS:      CARDIOMEDIASTINAL SILHOUETTE:  Cardiomediastinal silhouette is normal in size and configuration.  Previous median sternotomy.      LUNGS:  Less conspicuous previously seen retrocardiac nonspecific  atelectasis/infiltrate. No new major infiltrate or pleural effusion.  No pneumothorax.      ABDOMEN:  No remarkable upper abdominal findings.      BONES:  Multilevel degenerative changes.      Impression: 1. No new major infiltrate or pleural effusion. Previously seen  nonspecific retrocardiac infiltrate/atelectasis is less conspicuous              MACRO:  None      Signed by: Cal Noyola 12/9/2024 10:47 AM  Dictation workstation:   [id number]      Physical Exam    Relevant Results               Assessment/Plan                  Assessment & Plan  Atrial fibrillation with RVR (Multi)    Elevated troponin          Jemal Helton MD   Physician  Internal Medicine     H&P      Addendum     Date of Service: 12/8/2024  1:37 PM     Addendum       Expand All Collapse All    History Of Present Illness  Kristin Martines is a 79 y.o. female with a past medical  "history of diastolic heart failure, CABG x 4 in 2019, HLD, palpitations, GERD presenting with concern for generalized weakness.  Patient states that since last Tuesday, she has been sick with a “chest infection.\"  She states that initially, she was utilizing Mucinex, Tylenol, and elderberry, but this was not doing the trick.  She notified Dr. Helton of her symptoms, and was subsequently prescribed Medrol Dosepak and levofloxacin.  Patient states she took only 1 dose of each yesterday, and then decided to present to the emergency department for further evaluation.  She states she feels warm, but when she took her temperature was 99.3 °F.  She is also endorsing a cough productive of green mucus, green rhinorrhea, a heavy feeling in her chest, occasional palpitations that started with her infection, shortness of breath.  She states the heaviness in her chest is located the center, but denies active chest pain.  She states that just started this morning and she has never experienced it before.  She states it gets worse with cough.  She otherwise denies dizziness, syncope, leg swelling, nausea/vomiting, diarrhea, urinary symptoms, history of DVT/PE, hemoptysis, tobacco use, history of malignancy, recent surgery/recent travel/long car rides.  She states she does not use caffeine, and drinks everything caffeine free.  She does states she has been under some stress lately with work, as she works on [employer].  Does not smoke or drink alcohol.     ED course: On arrival, patient's BP 67/46, heart rate 68, respirations 18, afebrile, saturating 96% on room air.  BP did go up to 114/74.  Heart rate now 140.  Respirations 26.  Labs and imaging performed, revealing creatinine 1.09 and BUN 18 (0.87 and 16 on 11/16).  Calcium 10.9.  Lactate WNL.  BNP elevated at 470.  Initial troponin 12, delta troponin 61.  TSH WNL.  White count elevated at 20.4.  Blood cultures ordered and pending.  Chest x-ray shows questionable retrocardiac " infiltrate.  Patient given 5 mg Lopressor, 250 mcg digoxin.  IV Levaquin initiated.  CT angio head and neck and CT head ordered and pending.  Urinalysis ordered and pending.  Patient to be admitted inpatient under Dr. Helton.     Past Medical History  Medical History        Past Medical History:   Diagnosis Date    Encounter for screening for malignant neoplasm of vagina       Vaginal Pap smear    Other specified noninflammatory disorders of vulva and perineum       Vulval lesion    Personal history of other medical treatment 11/01/2019     H/O bone density study    Personal history of other medical treatment       H/O mammogram            Surgical History  Surgical History         Past Surgical History:   Procedure Laterality Date    FOOT SURGERY   12/02/2019     Foot Surgery    OTHER SURGICAL HISTORY   08/09/2021     Cardiac catheterization    OTHER SURGICAL HISTORY   04/29/2020     Coronary artery bypass graft    OTHER SURGICAL HISTORY   12/02/2019     Wrist Surgery    OTHER SURGICAL HISTORY   12/02/2019     Biopsy Vulvar            Social History  She reports that she has never smoked. She has never used smokeless tobacco. She reports current alcohol use. She reports that she does not use drugs.     Family History  Family History          Family History   Problem Relation Name Age of Onset    Hypertension Mother        Heart attack Father        Asthma Other family hx      Other (cardiac disorder) Other family hx      Hypertension Other family hx              Allergies  Amoxicillin-pot clavulanate, Cephalexin, Ciprofloxacin, Codeine, Erythromycin base, Esomeprazole magnesium, Ezetimibe, Oxycodone-acetaminophen, and Sulfamethoxazole-trimethoprim     Review of Systems  Negative except as stated above in HPI     Physical Exam  Constitutional:       General: She is not in acute distress.     Appearance: Normal appearance. She is not toxic-appearing.      Comments: Patient is alert and oriented x 3 upon my arrival.   "She is in normal sinus rhythm.  She frequently stops our interview to cough, but is not displaying conversational dyspnea.   HENT:      Head: Normocephalic and atraumatic.      Nose: Congestion and rhinorrhea present.      Mouth/Throat:      Mouth: Mucous membranes are moist.      Pharynx: Oropharynx is clear.   Eyes:      Extraocular Movements: Extraocular movements intact.      Conjunctiva/sclera: Conjunctivae normal.      Pupils: Pupils are equal, round, and reactive to light.   Cardiovascular:      Rate and Rhythm: Normal rate and regular rhythm.      Pulses: Normal pulses.   Pulmonary:      Effort: Pulmonary effort is normal.      Breath sounds: Normal breath sounds.   Abdominal:      General: Abdomen is flat. Bowel sounds are normal.      Palpations: Abdomen is soft.      Tenderness: There is no abdominal tenderness.   Musculoskeletal:         General: Normal range of motion.      Cervical back: Normal range of motion.      Right lower leg: No edema.      Left lower leg: No edema.   Skin:     General: Skin is warm and dry.   Neurological:      General: No focal deficit present.      Mental Status: She is alert and oriented to person, place, and time. Mental status is at baseline.   Psychiatric:         Mood and Affect: Mood normal.         Behavior: Behavior normal.         Thought Content: Thought content normal.            Last Recorded Vitals  Blood pressure 97/74, pulse 69, temperature 36 °C (96.8 °F), resp. rate (!) 40, height 1.626 m (5' 4\"), weight 72.6 kg (160 lb), SpO2 97%.     Relevant Results     Scheduled medications    Scheduled Medications   ALPRAZolam, 0.25 mg, oral, Nightly  aspirin, 81 mg, oral, Daily  cycloSPORINE, 1 drop, Both Eyes, BID  doxazosin, 4 mg, oral, Nightly  famotidine, 40 mg, oral, Nightly  heparin (porcine), 5,000 Units, subcutaneous, q8h  levoFLOXacin, 750 mg, intravenous, q24h  levothyroxine, 75 mcg, oral, Once per day on Sunday Tuesday Wednesday Thursday Saturday  metoprolol " succinate XL, 75 mg, oral, BID  perflutren lipid microspheres, 0.5-10 mL of dilution, intravenous, Once in imaging  perflutren protein A microsphere, 0.5 mL, intravenous, Once in imaging  polyethylene glycol, 17 g, oral, Daily  sertraline, 50 mg, oral, Daily  sulfur hexafluoride microsphr, 2 mL, intravenous, Once in imaging  valsartan, 320 mg, oral, Daily         Continuous medications  Continuous Medications         PRN medications  PRN Medications   PRN medications: acetaminophen **OR** acetaminophen **OR** acetaminophen, metoprolol, oxygen        CT angio head and neck w and wo IV contrast     Result Date: 12/8/2024  Interpreted By:  Mary Grace Byrd, STUDY: CT ANGIO HEAD AND NECK W AND WO IV CONTRAST;  12/8/2024 12:03 pm   INDICATION: Signs/Symptoms:vertigo, new a.fib, initial screening for cerebellar syndrome.     COMPARISON: Same day unenhanced head CT which is reported separately.   ACCESSION NUMBER(S): [id number]   ORDERING CLINICIAN: LUCIA ECHEVERRIA   TECHNIQUE: Subsequently, 90 ML of Omnipaque 350 was administered intravenously and axial images of the head and neck were acquired.  Coronal, sagittal, and 3-D reconstructions were provided for review.   3D reconstructions were performed utilizing independent software.   FINDINGS:     CTA HEAD FINDINGS:   Anterior circulation: There are diffuse calcifications of the intracranial segments of the internal carotid arteries. There appears to be at least mild narrowing bilaterally. There is subtle focal outpouching projecting inferomedially from the communicating segment of the right ICA measuring less than 2 mm on image 115 of 430. The proximal anterior cerebral arteries are patent. There is subtle narrowing of the proximal M1 segment of the right MCA. There is mild irregularity and narrowing of M2 and more distal MCA branches bilaterally.   Posterior circulation: The V4 segment on the right is dominant. There is mild irregularity and narrowing on the left. The  basilar artery is patent. Focal outpouching projecting superior laterally on the left from the basilar tip measuring approximately 2 mm on image 131 of 430 is favored to be related to an otherwise markedly diminutive P1 segment of the left PCA although an aneurysm could also give this appearance. Areas of irregularity and narrowing of the PCAs are suspected bilaterally.   CTA NECK FINDINGS:   Evaluation of the aortic arch and arch vessels is limited due to streak artifact from adjacent dense venous contrast. There are mild atherosclerotic changes of the visualized portion of the arch.   Carotid vessels:   The proximal common carotid arteries are degraded by artifact. They otherwise are patent. The carotid bifurcations demonstrate no measurable stenosis. There is tortuosity of the cervical ICAs bilaterally with subtle luminal irregularity but no measurable stenosis.   Vertebral vessels:   The cervical segments of the vertebral arteries are tortuous. There is subtle multifocal narrowing bilaterally due to impression from degenerative changes of the cervical spine.   Mucosal thickening and fluid are noted within the maxillary sinuses.   There are ground-glass opacities and irregular spiculated nodules in the right upper lobe, incompletely included on the obtained field-of-view but measuring up to at least 8-9 mm.   There is reversal of the normal cervical lordosis. There are multilevel degenerative changes of the cervical spine with associated central canal and neuroforaminal stenosis.          1. There is multifocal irregularity and narrowing of the anterior and posterior circulation vasculature without proximal large branch vessel cutoff. Please note MRI with diffusion-weighted imaging would be a more sensitive means of assessing for acute ischemic injury. 2. Focal outpouching arising from the communicating segment of the right ICA could represent an infundibular origin of an otherwise very small, poorly seen vessel  or sub 2 mm aneurysm. Apparent focal outpouching projecting anteriorly and to the left from the basilar tip may represent an otherwise markedly diminutive P1 segment of the left PCA although an aneurysm is not excluded. 3. Mild, multilevel narrowing of the cervical segments of the vertebral arteries due to impression from degenerative changes of the cervical spine. 4. Subtle luminal irregularity of the mid to distal cervical ICAs superimposed on vascular tortuosity is nonspecific but may be seen with FMD among other etiologies. There is no measurable stenosis. 5. Incompletely imaged irregular nodules in the right upper lobe with patchy ground-glass opacities are nonspecific and could be infectious or inflammatory in nature. Dedicated chest CT is recommended for further evaluation.   MACRO: None   Signed by: Mary Grace Byrd 12/8/2024 1:08 PM Dictation workstation:   [id number]     CT head wo IV contrast     Result Date: 12/8/2024  Interpreted By:  Gi Bloom, STUDY: CT HEAD WO IV CONTRAST;  12/8/2024 12:03 pm   INDICATION: Signs/Symptoms:dizziness.     COMPARISON: 05/24/2022   ACCESSION NUMBER(S): [id number]   ORDERING CLINICIAN: LUCIA ECHEVERRIA   TECHNIQUE: Unenhanced CT images of the head were obtained.   FINDINGS: The ventricles, cisterns and sulci are prominent, consistent with diffuse volume loss. There are areas of nonspecific white matter hypodensity, which are probably age related or microvascular in nature. These findings are similar to the prior exam. There is no acute intracranial hemorrhage, mass effect or midline shift. No extraaxial fluid collection.   No focal calvarial lesion.   Mild left ethmoid and bilateral maxillary sinus mucosal thickening. Small amount of fluid in the left maxillary sinus.            No acute intracranial hemorrhage or mass-effect.   Multifocal sinus mucosal thickening and small amount of fluid in the left maxillary sinus.   MACRO: None.   Signed by: Gi Bloom 12/8/2024  12:18 PM Dictation workstation:   [id number]     XR chest 2 views     Result Date: 12/8/2024  Interpreted By:  Gi Bloom, STUDY: XR CHEST 2 VIEWS;  12/8/2024 10:28 am   INDICATION: Signs/Symptoms:recent chest infection, burning in chest.     COMPARISON: 12/12/2019   ACCESSION NUMBER(S): [id number]   ORDERING CLINICIAN: LUCIA ECHEVERRIA   FINDINGS: Artifact from overlying monitoring leads noted. Questionable retrocardiac infiltrate versus confluence of shadows. No pleural effusion or pneumothorax. The cardiac silhouette is upper limits of normal in size status post sternotomy.        Questionable retrocardiac infiltrate. Clinical correlation and follow-up recommended.   MACRO: None.   Signed by: Gi Bloom 12/8/2024 10:38 AM Dictation workstation:   [id number]            Results for orders placed or performed during the hospital encounter of 12/08/24 (from the past 24 hours)   CBC and Auto Differential   Result Value Ref Range     WBC 20.4 (H) 4.4 - 11.3 x10*3/uL     nRBC 0.0 0.0 - 0.0 /100 WBCs     RBC 4.40 4.00 - 5.20 x10*6/uL     Hemoglobin 13.6 12.0 - 16.0 g/dL     Hematocrit 40.8 36.0 - 46.0 %     MCV 93 80 - 100 fL     MCH 30.9 26.0 - 34.0 pg     MCHC 33.3 32.0 - 36.0 g/dL     RDW 13.2 11.5 - 14.5 %     Platelets 308 150 - 450 x10*3/uL     Neutrophils % 83.8 40.0 - 80.0 %     Immature Granulocytes %, Automated 1.1 (H) 0.0 - 0.9 %     Lymphocytes % 8.7 13.0 - 44.0 %     Monocytes % 6.0 2.0 - 10.0 %     Eosinophils % 0.1 0.0 - 6.0 %     Basophils % 0.3 0.0 - 2.0 %     Neutrophils Absolute 17.07 (H) 1.60 - 5.50 x10*3/uL     Immature Granulocytes Absolute, Automated 0.22 0.00 - 0.50 x10*3/uL     Lymphocytes Absolute 1.77 0.80 - 3.00 x10*3/uL     Monocytes Absolute 1.23 (H) 0.05 - 0.80 x10*3/uL     Eosinophils Absolute 0.03 0.00 - 0.40 x10*3/uL     Basophils Absolute 0.06 0.00 - 0.10 x10*3/uL   Comprehensive metabolic panel   Result Value Ref Range     Glucose 127 (H) 74 - 99 mg/dL     Sodium 137 136 - 145  mmol/L     Potassium 4.0 3.5 - 5.3 mmol/L     Chloride 101 98 - 107 mmol/L     Bicarbonate 26 21 - 32 mmol/L     Anion Gap 14 10 - 20 mmol/L     Urea Nitrogen 18 6 - 23 mg/dL     Creatinine 1.09 (H) 0.50 - 1.05 mg/dL     eGFR 52 (L) >60 mL/min/1.73m*2     Calcium 10.9 (H) 8.6 - 10.3 mg/dL     Albumin 3.9 3.4 - 5.0 g/dL     Alkaline Phosphatase 77 33 - 136 U/L     Total Protein 7.6 6.4 - 8.2 g/dL     AST 15 9 - 39 U/L     Bilirubin, Total 0.7 0.0 - 1.2 mg/dL     ALT 13 7 - 45 U/L   B-Type Natriuretic Peptide   Result Value Ref Range      (H) 0 - 99 pg/mL   Troponin I, High Sensitivity, Initial   Result Value Ref Range     Troponin I, High Sensitivity 12 0 - 13 ng/L   TSH with reflex to Free T4 if abnormal   Result Value Ref Range     Thyroid Stimulating Hormone 3.03 0.44 - 3.98 mIU/L   Magnesium   Result Value Ref Range     Magnesium 1.88 1.60 - 2.40 mg/dL   BLOOD GAS VENOUS FULL PANEL   Result Value Ref Range     POCT pH, Venous 7.38 7.33 - 7.43 pH     POCT pCO2, Venous 46 41 - 51 mm Hg     POCT pO2, Venous 17 (L) 35 - 45 mm Hg     POCT SO2, Venous 24 (L) 45 - 75 %     POCT Oxy Hemoglobin, Venous 23.9 (L) 45.0 - 75.0 %     POCT Hematocrit Calculated, Venous 38.0 36.0 - 46.0 %     POCT Sodium, Venous 137 136 - 145 mmol/L     POCT Potassium, Venous 4.4 3.5 - 5.3 mmol/L     POCT Chloride, Venous 100 98 - 107 mmol/L     POCT Ionized Calicum, Venous 1.37 (H) 1.10 - 1.33 mmol/L     POCT Glucose, Venous 130 (H) 74 - 99 mg/dL     POCT Lactate, Venous 1.9 0.4 - 2.0 mmol/L     POCT Base Excess, Venous 1.6 -2.0 - 3.0 mmol/L     POCT HCO3 Calculated, Venous 27.2 (H) 22.0 - 26.0 mmol/L     POCT Hemoglobin, Venous 12.6 12.0 - 16.0 g/dL     POCT Anion Gap, Venous 14.0 10.0 - 25.0 mmol/L     Patient Temperature 37.0 degrees Celsius     FiO2 21 %   Lactate   Result Value Ref Range     Lactate 1.7 0.4 - 2.0 mmol/L   Coagulation Screen   Result Value Ref Range     Protime 15.8 (H) 9.8 - 12.8 seconds     INR 1.4 (H) 0.9 - 1.1      aPTT 26 (L) 27 - 38 seconds   Troponin, High Sensitivity, 1 Hour   Result Value Ref Range     Troponin I, High Sensitivity 61 (HH) 0 - 13 ng/L               Assessment/Plan     Assessment & Plan  Atrial fibrillation with RVR (Multi)     Atrial fibrillation with RVR  Elevated high-sensitivity troponins  Elevated BNP  History of diastolic heart failure  CABG x 4 (2019)  -Cardiology consult and recommendations appreciated  -Patient found to be in atrial fibrillation with RVR, new onset in the ED.  EKG, per ED physician, shows 134 bpm, atrial fibrillation, normal axis, QRS 84, QTc 471, intermittent elevations in lead III less than 1 mm, otherwise normal ST and T wave pattern with no evidence of acute ischemia or other acute findings  -Patient initially given 5 mg IV Lopressor which was unsuccessful, patient then given 250 mcg of digoxin per Dr. Helton.   -Patient in NSR at the time of my interview with HR in the 70's  -Last echo from 2019, echo added on  -Patient on 75 mg metoprolol 2 times daily at home, continue.  Adjustments per cardiology.  IV Lopressor as needed  -Continue home aspirin, doxazosin  -[id number] score 6, initiation of anticoagulation per cardiology/attending     Pneumonia  Shortness of breath  Leukocytosis  -Chest x-ray shows questionable retrocardiac infiltrate  -Will empirically treat for pneumonia, given patient states she has been sick with a “ chest infection” since last Tuesday and was recently on steroids/antibiotics.  Patient also with leukocytosis  -Continue IV levofloxacin given allergies to amoxicillin, erythromycin, cephalexin  -DuoNebs and albuterol as needed, oxygen as needed  -Strep pneumo and Legionella antigen, Pro-Yehuda added on and pending  -Following blood cultures and respiratory cultures     Acute kidney injury  Hypercalcemia  -Creatinine 1.09 today, 0.87 on 11/16  -Calcium 10.9 today, hold calcium supplements  -Avoid nephrotoxic medications, monitor with BMP     GERD  -Continue  home medications as ordered     DVT prophylaxis  -Heparin  -SCDs        I spent 65 minutes in the professional and overall care of this patient.  Patient fully evaluated and plan as above     Chikis Barone PA-C                 Revision History       Patient fully evaluated  12/09  for Principal Problem:    Atrial fibrillation with RVR (Multi)  Active Problems:    Elevated troponin    Patient seen resting in bed with head of bed elevated, no s/s or c/o acute difficulties at this time.  Vital signs for last 24 hours Temp:  [36 °C (96.8 °F)-36.9 °C (98.4 °F)] 36 °C (96.8 °F)  Heart Rate:  [72-76] 72  BP: (126-173)/(67-79) 126/67    No intake/output data recorded.  Patient still requiring frequent cardiac and SPO2 monitoring. Continue aggressive pulmonary hygiene and oral hygiene. Off loading as tolerated for skin integrity. Medications and labs reviewed-   Results for orders placed or performed during the hospital encounter of 12/08/24 (from the past 24 hours)   Transthoracic Echo (TTE) Complete   Result Value Ref Range    AV pk norma 1.46 m/s    AV mn grad 6 mmHg    LVOT diam 1.90 cm    MV E/A ratio 1.52     LA vol index A/L 30.9 ml/m2    Tricuspid annular plane systolic excursion 1.9 cm    LV EF 58 %    RV free wall pk S' 9.25 cm/s    LVIDd 3.90 cm    RVSP 32.8 mmHg    Aortic Valve Area by Continuity of VTI 1.96 cm2    Aortic Valve Area by Continuity of Peak Velocity 2.18 cm2    AV pk grad 9 mmHg    LV A4C EF 64.8    Heparin Assay, UFH   Result Value Ref Range    Heparin Unfractionated 0.3 See Comment Below for Therapeutic Ranges IU/mL   Heparin Assay, UFH   Result Value Ref Range    Heparin Unfractionated 0.2 See Comment Below for Therapeutic Ranges IU/mL   CBC   Result Value Ref Range    WBC 12.0 (H) 4.4 - 11.3 x10*3/uL    nRBC 0.0 0.0 - 0.0 /100 WBCs    RBC 4.07 4.00 - 5.20 x10*6/uL    Hemoglobin 12.0 12.0 - 16.0 g/dL    Hematocrit 37.6 36.0 - 46.0 %    MCV 92 80 - 100 fL    MCH 29.5 26.0 - 34.0 pg    MCHC 31.9 (L)  32.0 - 36.0 g/dL    RDW 13.2 11.5 - 14.5 %    Platelets 325 150 - 450 x10*3/uL   Comprehensive Metabolic Panel   Result Value Ref Range    Glucose 102 (H) 74 - 99 mg/dL    Sodium 137 136 - 145 mmol/L    Potassium 4.1 3.5 - 5.3 mmol/L    Chloride 103 98 - 107 mmol/L    Bicarbonate 26 21 - 32 mmol/L    Anion Gap 12 10 - 20 mmol/L    Urea Nitrogen 18 6 - 23 mg/dL    Creatinine 0.90 0.50 - 1.05 mg/dL    eGFR 66 >60 mL/min/1.73m*2    Calcium 9.4 8.6 - 10.3 mg/dL    Albumin 3.1 (L) 3.4 - 5.0 g/dL    Alkaline Phosphatase 57 33 - 136 U/L    Total Protein 6.0 (L) 6.4 - 8.2 g/dL    AST 17 9 - 39 U/L    Bilirubin, Total 0.5 0.0 - 1.2 mg/dL    ALT 10 7 - 45 U/L   Heparin Assay, UFH   Result Value Ref Range    Heparin Unfractionated 0.7 See Comment Below for Therapeutic Ranges IU/mL   POCT GLUCOSE   Result Value Ref Range    POCT Glucose 95 74 - 99 mg/dL   Heparin Assay, UFH   Result Value Ref Range    Heparin Unfractionated 0.4 See Comment Below for Therapeutic Ranges IU/mL      Patient recently received an antibiotic (last 12 hours)       None           Pt awake, conversant, and A&O x 3. Pt's heart rate was in the 90's upon examination. Pt stated that she has been experiencing SOB and labored breathing. Today's labs read a glucose of 89, sodium of 138, WBC of 15.2, RBC of 3.97, hemoglobin of 11.8, and hematocrit of 37.6. Cardiology was consulted and provided their input. Still awaiting the results of the echocardiogram which will help determine if a heart cath is needed. Pt's metoprolol has been increased to 100 mg to help better manage her atrial fibrillation. Pt is currently taking Xarelto 20 mg daily for protection against thromboembolism. As per pharmacy - her levofloxacin 750 mg IV q24hrs was adjusted to 750 mg IV q48hrs to better treat her pneumonia and is better for her renal function. Plan discussed with interdisciplinary team, continue current and repeat labs in the AM.     Discharge planning discussed with patient  and care team. Therapy evaluations ordered.  Patient aware and agreeable to current plan, continue plan as above.     I spent a total of 50 minutes on the date of the service which included preparing to see the patient, face-to-face patient care, completing clinical documentation, obtaining and/or reviewing separately obtained history, performing a medically appropriate examination, counseling and educating the patient/family/caregiver, ordering medications, tests, or procedures, communicating with other HCPs (not separately reported), independently interpreting results (not separately reported), communicating results to the patient/family/caregiver, and care coordination (not separately reported).       Patient fully evaluated  12/10  for Principal Problem:    Atrial fibrillation with RVR (Multi)  Active Problems:    Elevated troponin    Patient seen resting in bed with head of bed elevated, no s/s or c/o acute difficulties at this time.  Vital signs for last 24 hours Temp:  [36 °C (96.8 °F)-36.9 °C (98.4 °F)] 36 °C (96.8 °F)  Heart Rate:  [72-76] 72  BP: (126-173)/(67-79) 126/67    No intake/output data recorded.  Patient still requiring frequent cardiac and SPO2 monitoring. Continue aggressive pulmonary hygiene and oral hygiene. Off loading as tolerated for skin integrity. Medications and labs reviewed-   Results for orders placed or performed during the hospital encounter of 12/08/24 (from the past 24 hours)   Transthoracic Echo (TTE) Complete   Result Value Ref Range    AV pk norma 1.46 m/s    AV mn grad 6 mmHg    LVOT diam 1.90 cm    MV E/A ratio 1.52     LA vol index A/L 30.9 ml/m2    Tricuspid annular plane systolic excursion 1.9 cm    LV EF 58 %    RV free wall pk S' 9.25 cm/s    LVIDd 3.90 cm    RVSP 32.8 mmHg    Aortic Valve Area by Continuity of VTI 1.96 cm2    Aortic Valve Area by Continuity of Peak Velocity 2.18 cm2    AV pk grad 9 mmHg    LV A4C EF 64.8    Heparin Assay, UFH   Result Value Ref Range     Heparin Unfractionated 0.3 See Comment Below for Therapeutic Ranges IU/mL   Heparin Assay, UFH   Result Value Ref Range    Heparin Unfractionated 0.2 See Comment Below for Therapeutic Ranges IU/mL   CBC   Result Value Ref Range    WBC 12.0 (H) 4.4 - 11.3 x10*3/uL    nRBC 0.0 0.0 - 0.0 /100 WBCs    RBC 4.07 4.00 - 5.20 x10*6/uL    Hemoglobin 12.0 12.0 - 16.0 g/dL    Hematocrit 37.6 36.0 - 46.0 %    MCV 92 80 - 100 fL    MCH 29.5 26.0 - 34.0 pg    MCHC 31.9 (L) 32.0 - 36.0 g/dL    RDW 13.2 11.5 - 14.5 %    Platelets 325 150 - 450 x10*3/uL   Comprehensive Metabolic Panel   Result Value Ref Range    Glucose 102 (H) 74 - 99 mg/dL    Sodium 137 136 - 145 mmol/L    Potassium 4.1 3.5 - 5.3 mmol/L    Chloride 103 98 - 107 mmol/L    Bicarbonate 26 21 - 32 mmol/L    Anion Gap 12 10 - 20 mmol/L    Urea Nitrogen 18 6 - 23 mg/dL    Creatinine 0.90 0.50 - 1.05 mg/dL    eGFR 66 >60 mL/min/1.73m*2    Calcium 9.4 8.6 - 10.3 mg/dL    Albumin 3.1 (L) 3.4 - 5.0 g/dL    Alkaline Phosphatase 57 33 - 136 U/L    Total Protein 6.0 (L) 6.4 - 8.2 g/dL    AST 17 9 - 39 U/L    Bilirubin, Total 0.5 0.0 - 1.2 mg/dL    ALT 10 7 - 45 U/L   Heparin Assay, UFH   Result Value Ref Range    Heparin Unfractionated 0.7 See Comment Below for Therapeutic Ranges IU/mL   POCT GLUCOSE   Result Value Ref Range    POCT Glucose 95 74 - 99 mg/dL   Heparin Assay, UFH   Result Value Ref Range    Heparin Unfractionated 0.4 See Comment Below for Therapeutic Ranges IU/mL      Patient recently received an antibiotic (last 12 hours)       Date/Time Action Medication Dose Rate    12/10/24 0941 New Bag    levoFLOXacin (Levaquin) 750 mg in dextrose 5%  mL 750 mg 100 mL/hr           Pt will have a cardiac catheterization performed today and we will review the results afterwards. We will continue to monitor and modify the plan based on the results of the cardiac catheterization. The most recent labs read a glucose of 102, sodium of 137, WBC of 12, RBC of 4.07, hemoglobin  of 12, and hematocrit of 37.6.  Plan discussed with interdisciplinary team, continue current and repeat labs in the AM.  Still with some systolic hypertension and medication adjustments after heart catheterization.  Echocardiogram without wall motion abnormalities.    Discharge planning discussed with patient and care team. Therapy evaluations ordered. Patient aware and agreeable to current plan, continue plan as above.     I spent a total of 50 minutes on the date of the service which included preparing to see the patient, face-to-face patient care, completing clinical documentation, obtaining and/or reviewing separately obtained history, performing a medically appropriate examination, counseling and educating the patient/family/caregiver, ordering medications, tests, or procedures, communicating with other HCPs (not separately reported), independently interpreting results (not separately reported), communicating results to the patient/family/caregiver, and care coordination (not separately reported).     IZABELLA LO, Student PA

## 2024-12-10 NOTE — H&P
History and Physical   Pre Surgical Review (< 30 days)      History & Physical Reviewed    I have reviewed the History and Physical dated:  12/9/24   History and Physical reviewed and relevant findings noted. Patient examined to review pertinent physical  findings.: No significant changes   Home Medications Reviewed: see EMR   Allergies Reviewed: no changes noted      Home Medications  Current Outpatient Medications   Medication Instructions    acetaminophen (TYLENOL) 325 mg, Every 4 hours PRN    ALPRAZolam (XANAX) 0.25 mg, Nightly    aspirin 81 mg, Daily    cycloSPORINE (Restasis) 0.05 % ophthalmic emulsion 1 drop, Both Eyes, 2 times daily    doxazosin (CARDURA) 4 mg, Nightly    ergocalciferol (VITAMIN D-2) 50,000 Units, oral, Weekly, Every monday    estradiol (ESTRACE) 1 g, 2 times weekly    famotidine (Pepcid) 40 mg tablet 1 tablet, Nightly    levoFLOXacin (Levaquin) 500 mg tablet 1 tablet, oral, Daily    levothyroxine (SYNTHROID, LEVOXYL) 75 mcg, 5 times weekly    methylPREDNISolone (Medrol Dospak) 4 mg tablets See admin instructions    metoprolol succinate XL (TOPROL-XL) 75 mg, oral, 2 times daily    multivitamin (MULTIPLE VITAMINS ORAL) 1 tablet, Daily    nitroglycerin (NITROSTAT) 0.3 mg    olmesartan (BENICAR) 40 mg, oral, Daily    Repatha SureClick 140 mg, subcutaneous, Every 14 days    sertraline (Zoloft) 50 mg tablet 1 tablet, Daily    spironolactone (ALDACTONE) 25 mg, oral, Daily    triamcinolone (Kenalog) 0.1 % ointment 1 Application, 2 times daily        Allergies  Allergies   Allergen Reactions    Amoxicillin-Pot Clavulanate Swelling    Cephalexin Itching    Ciprofloxacin Unknown    Codeine Unknown    Erythromycin Base Unknown    Esomeprazole Magnesium Unknown    Ezetimibe Unknown    Oxycodone-Acetaminophen Other     tightness in throat    Sulfamethoxazole-Trimethoprim Swelling       Physical Exam  Physical Exam  Constitutional:       General: She is not in acute distress.  Cardiovascular:       Rate and Rhythm: Normal rate and regular rhythm.      Comments: + pulses all extremities.  Pulmonary:      Effort: Pulmonary effort is normal. No respiratory distress.      Comments: Diminished bilaterally.  Abdominal:      General: Bowel sounds are normal.   Skin:     General: Skin is warm and dry.   Neurological:      General: No focal deficit present.      Mental Status: She is alert and oriented to person, place, and time.   Psychiatric:         Mood and Affect: Mood normal.         Behavior: Behavior normal.        Airway/Sedation Assessment     Assessment by anesthesia N/A     ·  Mouth Opening OK yes      Neck Flexibility OK yes      Loose Teeth No   ·  Oropharyngeal Classification Grade III   ·  ASA PS Classification ASA III - Patient with severe systemic disease       Sedation Plan Moderate     Risks, benefits, and alternatives discussed with patient.     ERAS (Enhanced Recovery After Surgery):  ·  ERAS Patient: No      Consent:   COVID-19 Consent:  ·  COVID-19 Risk Consent Surgeon has reviewed key risks related to the risk of david COVID-19 and if they contract COVID-19 what the risks are.     Kalpana Campos, APRN-CNP

## 2024-12-10 NOTE — PROGRESS NOTES
Subjective Data:  Patient underwent left heart catheterization and coronary angiogram with me today.     Objective Data:  Last Recorded Vitals:  Vitals:    12/10/24 1404 12/10/24 1405 12/10/24 1439 12/10/24 1613   BP:  (!) 189/94 170/78 124/61   Pulse:  62 67 74   Resp:  16 19    Temp:    37.1 °C (98.8 °F)   TempSrc:       SpO2: 98% 99% 97% (!) 85%   Weight:       Height:           Last Labs:  CBC - 12/10/2024:  5:00 AM  12.0 12.0 325    37.6      CMP - 12/10/2024:  5:00 AM  9.4 6.0 17 --- 0.5   _ 3.1 10 57      PTT - 12/8/2024: 11:03 AM  1.4   15.8 26     TROPHS   Date/Time Value Ref Range Status   12/08/2024 03:53 PM 8,818 0 - 13 ng/L Final     Comment:     Previous result verified on 12/8/2024 1217 on specimen/case [id number] [facility] T[facility], High Sensitivity, 1 Hour with value 61 ng/L.   12/08/2024 02:44 PM 5,529 0 - 13 ng/L Final     Comment:     Previous result verified on 12/8/2024 1217 on specimen/case [id number] [facility] T[facility], High Sensitivity, 1 Hour with value 61 ng/L.   12/08/2024 11:25 AM 61 0 - 13 ng/L Final     BNP   Date/Time Value Ref Range Status   12/08/2024 10:24  0 - 99 pg/mL Final     HGBA1C   Date/Time Value Ref Range Status   11/16/2024 11:45 AM 5.7 See comment % Final   05/02/2024 12:08 PM 5.3 see below % Final     LDLCALC   Date/Time Value Ref Range Status   11/26/2024 01:44 PM 68 <=99 mg/dL Final     Comment:                                 Near   Borderline      AGE      Desirable  Optimal    High     High     Very High     0-19 Y     0 - 109     ---    110-129   >/= 130     ----    20-24 Y     0 - 119     ---    120-159   >/= 160     ----      >24 Y     0 -  99   100-129  130-159   160-189     >/=190     10/24/2023 12:01 PM 68 <=99 mg/dL Final     Comment:                                 Near   Borderline      AGE      Desirable  Optimal    High     High     Very High     0-19 Y     0 - 109     ---     110-129   >/= 130     ----    20-24 Y     0 - 119     ---    120-159   >/= 160     ----      >24 Y     0 -  99   100-129  130-159   160-189     >/=190       VLDL   Date/Time Value Ref Range Status   11/26/2024 01:44 PM 19 0 - 40 mg/dL Final   10/24/2023 12:01 PM 22 0 - 40 mg/dL Final   03/17/2023 12:06 PM 15 0 - 40 mg/dL Final   10/21/2022 11:30 AM 20 0 - 40 mg/dL Final   03/02/2022 02:20 PM 24 0 - 40 mg/dL Final      Last I/O:  I/O last 3 completed shifts:  In: - (0 mL/kg)   Out: 200 (2.7 mL/kg) [Urine:200 (0.1 mL/kg/hr)]  Weight: 73.8 kg     Past Cardiology Tests (Last 3 Years):  EKG:  ECG 12 lead 12/08/2024 (Preliminary)      ECG 12 Lead 11/12/2024      ECG 12 Lead 08/01/2024      ECG 12 Lead 04/16/2024      ECG 12 Lead 01/18/2024      ECG 12 Lead 10/11/2023    Echo:  No results found for this or any previous visit from the past 1095 days.    Ejection Fractions:  EF   Date/Time Value Ref Range Status   12/09/2024 03:21 PM 58 %      Cath:  No results found for this or any previous visit from the past 1095 days.    Stress Test:  No results found for this or any previous visit from the past 1095 days.    Cardiac Imaging:  No results found for this or any previous visit from the past 1095 days.      Inpatient Medications:  Scheduled medications   Medication Dose Route Frequency    ALPRAZolam  0.25 mg oral Nightly    aspirin  81 mg oral Daily    clopidogrel  75 mg oral Daily    cycloSPORINE  1 drop Both Eyes BID    doxazosin  4 mg oral Nightly    famotidine  40 mg oral Nightly    levoFLOXacin  750 mg intravenous q48h    levothyroxine  75 mcg oral Once per day on Monday Tuesday Wednesday Thursday Friday    metoprolol succinate XL  100 mg oral BID    perflutren lipid microspheres  0.5-10 mL of dilution intravenous Once in imaging    perflutren protein A microsphere  0.5 mL intravenous Once in imaging    polyethylene glycol  17 g oral Daily    sertraline  50 mg oral Daily    sulfur hexafluoride microsphr  2 mL  intravenous Once in imaging    valsartan  320 mg oral Daily     PRN medications   Medication    acetaminophen    Or    acetaminophen    Or    acetaminophen    metoprolol    ondansetron    oxygen    oxygen    sodium chloride     Continuous Medications   Medication Dose Last Rate       Physical Exam:  GEN: SD SN 80 yo wf sitting 45 degrees in the bed  HEENT: Atraumatic, normocephalic  COR: Reg.  LUNGS: Clear  EXT: Warm     Assessment/Plan   1.) Acute location indeterminate NSTEM with peak Troponin to 8000 range with ASHD S/P CABG  2.) Paroxysmal atrail fibrillation  3.)HLD  PLAN:  1.) Await echocardiogram  2.) Cardiac catheterization will discuss with Dr. Elizabeth    December 10, 2024  Patient underwent left heart catheterization and coronary angiogram with me today through right femoral approach.  Her vein grafts to PDA and PLV as well as diagonal branch were patent.  LIMA was atretic likely due to not severe obstructive disease in the LAD.  The flow in the LAD is good.  This is appropriate for medical management.  The patient is atrial fibrillation, will initiate Eliquis 5 mg twice daily from tomorrow night.  Will continue with baby aspirin.  No more need for Plavix as she will be on Eliquis and aspirin and no stent was needed today.  Will continue with statin and blood pressure control on current medications.  Monitoring postprocedure tonight.            Peripheral IV 12/08/24 18 G Left;Proximal Forearm (Active)   Site Assessment Clean;Dry;Intact 12/09/24 0900   Dressing Type Transparent 12/09/24 0900   Line Status Capped 12/09/24 0900   Dressing Status Clean;Dry 12/09/24 0900   Number of days: 1       Code Status:  Full Code    I spent 30 minutes in the professional and overall care of this patient.        Lambert Elizabeth MD PhD

## 2024-12-10 NOTE — DISCHARGE INSTRUCTIONS
CARDIAC CATHETERIZATION DISCHARGE INSTRUCTIONS     FOR SUDDEN AND SEVERE CHEST PAIN, SHORTNESS OF BREATH, EXCESSIVE BLEEDING, SIGNS OF STROKE, OR CHANGES IN MENTAL STATUS YOU SHOULD CALL 911 IMMEDIATELY.     If your provider has prescribed aspirin and/or clopidogrel (Plavix), or prasugrel (Effient), or ticagrelor (Brilinta), DO NOT STOP THESE MEDICATIONS for any reason without talking to your cardiologist first. If any of these were prescribed, you must take them every day without missing a single dose. If you are getting low on these medications, contact your provider immediately for a refill.     FOR NEXT 24 HOURS  - Upon discharge, you should return home and rest for the remainder of the day and evening. You do not have to stay on bed rest but should not be very active.  It is recommended a responsible adult be with you for the first 24 hours after the procedure.    - No driving for 24 hours after procedure. Please arrange for someone to drive you home from the hospital today.     - Do not drive, operate machinery, or use power tools for 24 hours after your procedure.     - Do not make any legal decisions for 24 hours after your procedure.     - Do not drink alcoholic beverages for 24 hours after your procedure.    WOUND CARE   *FOR FEMORAL (LEG) ACCESS*  ·      Avoid heavy lifting (over 10 pounds) for 7 days, squatting or excessive bending for 7 days, and strenuous exercise for 7 days.  ·      No submerged bathing, swimming, or hot tubs for the next 7 days, or until fully healed.  ·      Avoid sexual activity for 3-4 days until any groin discomfort has ceased.    - The transparent dressing should be removed from the site 24 hours after the procedure.  Wash the site gently with soap and water. Rinse well and pat dry. Keep the area clean and dry. You may apply a Band-Aid to the site. Avoid lotions, ointments, or powders until fully healed.     - You may shower the day after your procedure.      - It is  normal to notice a small bruise around the puncture site and/or a small grape sized or smaller lump. Any large bruising or large lump warrants a call to the office.     - If bleeding should occur, lay down and apply pressure to the affected area for 10 minutes.  If the bleeding stops notify your physician.  If there is a large amount of bleeding or spurting of blood CALL 911 immediately.  DO NOT drive yourself to the hospital.    - You may experience some tenderness, bruising or minimal inflammation.  If you have any concerns, you may contact the Cath Lab or if any of these symptoms become excessive, contact your cardiologist or go to the emergency room.     OTHER INSTRUCTIONS  - You may take acetaminophen (Tylenol) as directed for discomfort.  If pain is not relieved with acetaminophen (Tylenol), contact your doctor.    - If you notice or experience any of the following, you should notify your doctor or seek medical attention  Chest pain or discomfort  Change in mental status or weakness in extremities.  Dizziness, light headedness, or feeling faint.  Change in the site where the procedure was performed, such as bleeding or an increased area of bruising or swelling.  Tingling, numbness, pain, or coolness in the leg/arm beyond the site where the procedure was performed.  Signs of infection (i.e. shaking chills, temperature > 100 degrees Fahrenheit, warmth, redness) in the leg/arm area where the procedure was performed.  Changes in urination   Bloody or black stools  Vomiting blood  Severe nose bleeds  Any excessive bleeding    - If you DO NOT have an appointment with your cardiologist within 2-4 weeks following your procedure, please contact their office.

## 2024-12-10 NOTE — POST-PROCEDURE NOTE
Physician Transition of Care Summary  Invasive Cardiovascular Lab    Procedure Date: 12/10/2024  Attending:    * Lambert Elizabeth - Primary  Complications:  No in-lab complications observed.     Cardiac Cath Post Procedure Notes:  Post Procedure Diagnosis: Coronary artery disease as above.  Blood Loss:               Estimated blood loss during the procedure was 5 mls.  Specimens Removed:        Number of specimen(s) removed: none.        Recommendations:  Maximize medical therapy.  Agressive risk factor modification efforts.  Follow-up with cardiology clinic.  Medical management of coronary artery disease.     ____________________________________________________________________________________  CONCLUSIONS:   1. Luminal irregularities throughout left main            Proximal to mid LAD multiple 30 to 50% lesions. Luminal irregularities in the rest of the vessel. No competitive flow in LAD            Luminal irregularities throughout left circumflex            Mid RCA  with left-to-right collaterals            Patent SVG sequential graft to PDA and PLV            Patent SVG to diagonal branch            Atretic LIMA            Right dominant coronary artery system            LVEDP 7 mmHg without significant gradient across aortic valve.       Please refer to the full report that is in the system.    Electronically signed by: Lambert Elizabeth MD PhD, 12/10/2024 4:23 PM

## 2024-12-11 LAB
ALBUMIN SERPL BCP-MCNC: 3.2 G/DL (ref 3.4–5)
ALP SERPL-CCNC: 60 U/L (ref 33–136)
ALT SERPL W P-5'-P-CCNC: 8 U/L (ref 7–45)
ANION GAP SERPL CALC-SCNC: 9 MMOL/L (ref 10–20)
AST SERPL W P-5'-P-CCNC: 14 U/L (ref 9–39)
BACTERIA SPEC RESP CULT: NORMAL
BILIRUB SERPL-MCNC: 0.4 MG/DL (ref 0–1.2)
BUN SERPL-MCNC: 16 MG/DL (ref 6–23)
CALCIUM SERPL-MCNC: 9.8 MG/DL (ref 8.6–10.3)
CHLORIDE SERPL-SCNC: 103 MMOL/L (ref 98–107)
CO2 SERPL-SCNC: 30 MMOL/L (ref 21–32)
CREAT SERPL-MCNC: 0.85 MG/DL (ref 0.5–1.05)
EGFRCR SERPLBLD CKD-EPI 2021: 70 ML/MIN/1.73M*2
ERYTHROCYTE [DISTWIDTH] IN BLOOD BY AUTOMATED COUNT: 13.2 % (ref 11.5–14.5)
GLUCOSE SERPL-MCNC: 101 MG/DL (ref 74–99)
GRAM STN SPEC: NORMAL
GRAM STN SPEC: NORMAL
HCT VFR BLD AUTO: 37.9 % (ref 36–46)
HGB BLD-MCNC: 12.1 G/DL (ref 12–16)
MCH RBC QN AUTO: 30.2 PG (ref 26–34)
MCHC RBC AUTO-ENTMCNC: 31.9 G/DL (ref 32–36)
MCV RBC AUTO: 95 FL (ref 80–100)
NRBC BLD-RTO: 0 /100 WBCS (ref 0–0)
PLATELET # BLD AUTO: 345 X10*3/UL (ref 150–450)
POTASSIUM SERPL-SCNC: 5.2 MMOL/L (ref 3.5–5.3)
PROT SERPL-MCNC: 6.3 G/DL (ref 6.4–8.2)
RBC # BLD AUTO: 4.01 X10*6/UL (ref 4–5.2)
SODIUM SERPL-SCNC: 137 MMOL/L (ref 136–145)
WBC # BLD AUTO: 9.3 X10*3/UL (ref 4.4–11.3)

## 2024-12-11 PROCEDURE — 80053 COMPREHEN METABOLIC PANEL: CPT | Performed by: INTERNAL MEDICINE

## 2024-12-11 PROCEDURE — 2500000001 HC RX 250 WO HCPCS SELF ADMINISTERED DRUGS (ALT 637 FOR MEDICARE OP): Performed by: STUDENT IN AN ORGANIZED HEALTH CARE EDUCATION/TRAINING PROGRAM

## 2024-12-11 PROCEDURE — 2500000002 HC RX 250 W HCPCS SELF ADMINISTERED DRUGS (ALT 637 FOR MEDICARE OP, ALT 636 FOR OP/ED)

## 2024-12-11 PROCEDURE — 2500000001 HC RX 250 WO HCPCS SELF ADMINISTERED DRUGS (ALT 637 FOR MEDICARE OP): Performed by: NURSE PRACTITIONER

## 2024-12-11 PROCEDURE — 2500000001 HC RX 250 WO HCPCS SELF ADMINISTERED DRUGS (ALT 637 FOR MEDICARE OP)

## 2024-12-11 PROCEDURE — 99233 SBSQ HOSP IP/OBS HIGH 50: CPT | Performed by: STUDENT IN AN ORGANIZED HEALTH CARE EDUCATION/TRAINING PROGRAM

## 2024-12-11 PROCEDURE — 85027 COMPLETE CBC AUTOMATED: CPT | Performed by: INTERNAL MEDICINE

## 2024-12-11 PROCEDURE — 2500000001 HC RX 250 WO HCPCS SELF ADMINISTERED DRUGS (ALT 637 FOR MEDICARE OP): Performed by: INTERNAL MEDICINE

## 2024-12-11 PROCEDURE — 2500000002 HC RX 250 W HCPCS SELF ADMINISTERED DRUGS (ALT 637 FOR MEDICARE OP, ALT 636 FOR OP/ED): Performed by: NURSE PRACTITIONER

## 2024-12-11 PROCEDURE — 36415 COLL VENOUS BLD VENIPUNCTURE: CPT | Performed by: INTERNAL MEDICINE

## 2024-12-11 PROCEDURE — 1200000002 HC GENERAL ROOM WITH TELEMETRY DAILY

## 2024-12-11 RX ORDER — LEVOFLOXACIN 500 MG/1
500 TABLET, FILM COATED ORAL EVERY 24 HOURS
Status: DISCONTINUED | OUTPATIENT
Start: 2024-12-11 | End: 2024-12-12 | Stop reason: HOSPADM

## 2024-12-11 RX ORDER — BENZONATATE 100 MG/1
200 CAPSULE ORAL 3 TIMES DAILY PRN
Status: DISCONTINUED | OUTPATIENT
Start: 2024-12-11 | End: 2024-12-12 | Stop reason: HOSPADM

## 2024-12-11 RX ORDER — BENZONATATE 100 MG/1
100 CAPSULE ORAL 3 TIMES DAILY PRN
Status: DISCONTINUED | OUTPATIENT
Start: 2024-12-11 | End: 2024-12-11 | Stop reason: SDUPTHER

## 2024-12-11 RX ADMIN — CYCLOSPORINE 1 DROP: 0.5 EMULSION OPHTHALMIC at 08:11

## 2024-12-11 RX ADMIN — SERTRALINE HYDROCHLORIDE 50 MG: 50 TABLET ORAL at 08:11

## 2024-12-11 RX ADMIN — CYCLOSPORINE 1 DROP: 0.5 EMULSION OPHTHALMIC at 20:12

## 2024-12-11 RX ADMIN — ALPRAZOLAM 0.25 MG: 0.25 TABLET ORAL at 20:04

## 2024-12-11 RX ADMIN — ATORVASTATIN CALCIUM 40 MG: 40 TABLET, FILM COATED ORAL at 20:05

## 2024-12-11 RX ADMIN — ACETAMINOPHEN 650 MG: 325 TABLET, FILM COATED ORAL at 14:50

## 2024-12-11 RX ADMIN — FAMOTIDINE 40 MG: 20 TABLET, FILM COATED ORAL at 20:05

## 2024-12-11 RX ADMIN — BENZONATATE 100 MG: 100 CAPSULE ORAL at 16:54

## 2024-12-11 RX ADMIN — VALSARTAN 320 MG: 80 TABLET, FILM COATED ORAL at 08:11

## 2024-12-11 RX ADMIN — LEVOFLOXACIN 500 MG: 500 TABLET, FILM COATED ORAL at 18:24

## 2024-12-11 RX ADMIN — METOPROLOL SUCCINATE 100 MG: 50 TABLET, EXTENDED RELEASE ORAL at 08:11

## 2024-12-11 RX ADMIN — ASPIRIN 81 MG CHEWABLE TABLET 81 MG: 81 TABLET CHEWABLE at 08:11

## 2024-12-11 RX ADMIN — METOPROLOL SUCCINATE 100 MG: 50 TABLET, EXTENDED RELEASE ORAL at 20:05

## 2024-12-11 RX ADMIN — DOXAZOSIN 4 MG: 2 TABLET ORAL at 20:05

## 2024-12-11 RX ADMIN — APIXABAN 5 MG: 5 TABLET, FILM COATED ORAL at 20:05

## 2024-12-11 RX ADMIN — LEVOTHYROXINE SODIUM 75 MCG: 0.07 TABLET ORAL at 06:23

## 2024-12-11 ASSESSMENT — COGNITIVE AND FUNCTIONAL STATUS - GENERAL
CLIMB 3 TO 5 STEPS WITH RAILING: A LITTLE
MOBILITY SCORE: 23
DAILY ACTIVITIY SCORE: 24

## 2024-12-11 ASSESSMENT — PAIN SCALES - GENERAL
PAINLEVEL_OUTOF10: 0 - NO PAIN
PAINLEVEL_OUTOF10: 5 - MODERATE PAIN
PAINLEVEL_OUTOF10: 2

## 2024-12-11 ASSESSMENT — PAIN DESCRIPTION - LOCATION: LOCATION: HEAD

## 2024-12-11 ASSESSMENT — PAIN DESCRIPTION - DESCRIPTORS
DESCRIPTORS: ACHING
DESCRIPTORS: ACHING

## 2024-12-11 ASSESSMENT — PAIN - FUNCTIONAL ASSESSMENT
PAIN_FUNCTIONAL_ASSESSMENT: 0-10
PAIN_FUNCTIONAL_ASSESSMENT: 0-10

## 2024-12-11 NOTE — CARE PLAN
The patient's goals for the shift include      The clinical goals for the shift include comfort and safety maintained    Over the shift, the patient did make progress toward the following goals..

## 2024-12-11 NOTE — PROGRESS NOTES
Subjective Data:    No acute issues overnight.  No chest pain or shortness of breath.  Had better sleep last night.    Objective Data:  Last Recorded Vitals:  Vitals:    12/10/24 2142 12/11/24 0320 12/11/24 0733 12/11/24 1149   BP: 128/63 124/66 147/72 116/90   Pulse: 75 67 74 71   Resp:       Temp: 37 °C (98.6 °F) 36.4 °C (97.5 °F) 36.2 °C (97.2 °F) 36.2 °C (97.2 °F)   TempSrc:       SpO2: 93% 93% 94% 96%   Weight:       Height:           Last Labs:  CBC - 12/11/2024:  6:25 AM  9.3 12.1 345    37.9      CMP - 12/11/2024:  6:25 AM  9.8 6.3 14 --- 0.4   _ 3.2 8 60      PTT - 12/8/2024: 11:03 AM  1.4   15.8 26     TROPHS   Date/Time Value Ref Range Status   12/08/2024 03:53 PM 8,818 0 - 13 ng/L Final     Comment:     Previous result verified on 12/8/2024 1217 on specimen/case [id number] called with component TRPHS for procedure Troponin, High Sensitivity, 1 Hour with value 61 ng/L.   12/08/2024 02:44 PM 5,529 0 - 13 ng/L Final     Comment:     Previous result verified on 12/8/2024 1217 on specimen/case [id number] called with component TRPHS for procedure Troponin, High Sensitivity, 1 Hour with value 61 ng/L.   12/08/2024 11:25 AM 61 0 - 13 ng/L Final     BNP   Date/Time Value Ref Range Status   12/08/2024 10:24  0 - 99 pg/mL Final     HGBA1C   Date/Time Value Ref Range Status   11/16/2024 11:45 AM 5.7 See comment % Final   05/02/2024 12:08 PM 5.3 see below % Final     LDLCALC   Date/Time Value Ref Range Status   11/26/2024 01:44 PM 68 <=99 mg/dL Final     Comment:                                 Near   Borderline      AGE      Desirable  Optimal    High     High     Very High     0-19 Y     0 - 109     ---    110-129   >/= 130     ----    20-24 Y     0 - 119     ---    120-159   >/= 160     ----      >24 Y     0 -  99   100-129  130-159   160-189     >/=190     10/24/2023 12:01 PM 68 <=99 mg/dL Final     Comment:                                 Near   Borderline      AGE      Desirable  Optimal    High      High     Very High     0-19 Y     0 - 109     ---    110-129   >/= 130     ----    20-24 Y     0 - 119     ---    120-159   >/= 160     ----      >24 Y     0 -  99   100-129  130-159   160-189     >/=190       VLDL   Date/Time Value Ref Range Status   11/26/2024 01:44 PM 19 0 - 40 mg/dL Final   10/24/2023 12:01 PM 22 0 - 40 mg/dL Final   03/17/2023 12:06 PM 15 0 - 40 mg/dL Final   10/21/2022 11:30 AM 20 0 - 40 mg/dL Final   03/02/2022 02:20 PM 24 0 - 40 mg/dL Final      Last I/O:  I/O last 3 completed shifts:  In: 370.9 (5 mL/kg) [I.V.:220.9 (3 mL/kg); IV Piggyback:150]  Out: 5 (0.1 mL/kg) [Blood:5]  Weight: 73.8 kg     Past Cardiology Tests (Last 3 Years):  EKG:  ECG 12 lead 12/08/2024 (Preliminary)      ECG 12 Lead 11/12/2024      ECG 12 Lead 08/01/2024      ECG 12 Lead 04/16/2024      ECG 12 Lead 01/18/2024      ECG 12 Lead 10/11/2023    Echo:  No results found for this or any previous visit from the past 1095 days.    Ejection Fractions:  EF   Date/Time Value Ref Range Status   12/09/2024 03:21 PM 58 %      Cath:  No results found for this or any previous visit from the past 1095 days.    Stress Test:  No results found for this or any previous visit from the past 1095 days.    Cardiac Imaging:  No results found for this or any previous visit from the past 1095 days.      Inpatient Medications:  Scheduled medications   Medication Dose Route Frequency    ALPRAZolam  0.25 mg oral Nightly    apixaban  5 mg oral q12h    aspirin  81 mg oral Daily    atorvastatin  40 mg oral Nightly    cycloSPORINE  1 drop Both Eyes BID    doxazosin  4 mg oral Nightly    famotidine  40 mg oral Nightly    levoFLOXacin  750 mg intravenous q48h    levothyroxine  75 mcg oral Once per day on Monday Tuesday Wednesday Thursday Friday    metoprolol succinate XL  100 mg oral BID    perflutren lipid microspheres  0.5-10 mL of dilution intravenous Once in imaging    perflutren protein A microsphere  0.5 mL intravenous Once in imaging     polyethylene glycol  17 g oral Daily    sertraline  50 mg oral Daily    sulfur hexafluoride microsphr  2 mL intravenous Once in imaging    valsartan  320 mg oral Daily     PRN medications   Medication    acetaminophen    Or    acetaminophen    Or    acetaminophen    metoprolol    ondansetron    oxygen    oxygen    sodium chloride     Continuous Medications   Medication Dose Last Rate       Physical Exam:  GEN: SD SN 78 yo wf sitting 45 degrees in the bed  HEENT: Atraumatic, normocephalic  COR: Reg.  Good access site   LUNGS: Clear  EXT: Warm     Assessment/Plan   1.) Acute location indeterminate NSTEM with peak Troponin to 8000 range with ASHD S/P CABG  2.) Paroxysmal atrail fibrillation  3.)HLD  PLAN:  1.) Await echocardiogram  2.) Cardiac catheterization will discuss with Dr. Elizabeth    December 10, 2024  Patient underwent left heart catheterization and coronary angiogram with me today through right femoral approach.  Her vein grafts to PDA and PLV as well as diagonal branch were patent.  LIMA was atretic likely due to not severe obstructive disease in the LAD.  The flow in the LAD is good.  This is appropriate for medical management.  The patient is atrial fibrillation, will initiate Eliquis 5 mg twice daily from tomorrow night.  Will continue with baby aspirin.  No more need for Plavix as she will be on Eliquis and aspirin and no stent was needed today.  Will continue with statin and blood pressure control on current medications.  Monitoring postprocedure tonight.      December 11, 2024  Remains chest pain-free.  Patient ejection fraction and echocardiogram.  Okay to discharge from cardiac standpoint on current medications with follow-up in cardiology clinic in 2 weeks.      Peripheral IV 12/08/24 18 G Left;Proximal Forearm (Active)   Site Assessment Clean;Dry;Intact 12/09/24 0900   Dressing Type Transparent 12/09/24 0900   Line Status Capped 12/09/24 0900   Dressing Status Clean;Dry 12/09/24 0900   Number  of days: 1       Code Status:  Full Code    I spent 30 minutes in the professional and overall care of this patient.        Lambert Elizabeth MD PhD

## 2024-12-11 NOTE — PROGRESS NOTES
12/11/24 1503   Discharge Planning   Living Arrangements Alone   Support Systems Family members;Friends/neighbors   Assistance Needed independent   Type of Residence Private residence   Number of Stairs to Enter Residence 1   Number of Stairs Within Residence 24   Expected Discharge Disposition Home   Does the patient need discharge transport arranged? No     I met with patient at the bedside, verified insurance and demographics.  Patient lives alone.  She does not use mobility aids, denies falls, drives, works with the public and is independent with all ADLs.  Patient denies homegoing needs but is agreeable to [employer] to monitor HR, pO2; referral sent.  Care Coordination team following for assistance with discharge planning as needed.  Chata THOMPSON T[facility]

## 2024-12-11 NOTE — CARE PLAN
The patient's goals for the shift include      The clinical goals for the shift include Maintain safety and free from adverse affects post cardiac catherization      Problem: Fall/Injury  Goal: Be free from injury by end of the shift  Outcome: Progressing

## 2024-12-12 ENCOUNTER — PHARMACY VISIT (OUTPATIENT)
Dept: PHARMACY | Facility: CLINIC | Age: 79
End: 2024-12-12
Payer: MEDICARE

## 2024-12-12 VITALS
RESPIRATION RATE: 20 BRPM | HEIGHT: 64 IN | WEIGHT: 162.7 LBS | TEMPERATURE: 97.9 F | SYSTOLIC BLOOD PRESSURE: 132 MMHG | BODY MASS INDEX: 27.78 KG/M2 | DIASTOLIC BLOOD PRESSURE: 73 MMHG | HEART RATE: 62 BPM | OXYGEN SATURATION: 94 %

## 2024-12-12 LAB
ALBUMIN SERPL BCP-MCNC: 3.2 G/DL (ref 3.4–5)
ALP SERPL-CCNC: 55 U/L (ref 33–136)
ALT SERPL W P-5'-P-CCNC: 8 U/L (ref 7–45)
ANION GAP SERPL CALC-SCNC: 10 MMOL/L (ref 10–20)
AST SERPL W P-5'-P-CCNC: 12 U/L (ref 9–39)
BACTERIA BLD CULT: NORMAL
BACTERIA BLD CULT: NORMAL
BILIRUB SERPL-MCNC: 0.4 MG/DL (ref 0–1.2)
BUN SERPL-MCNC: 15 MG/DL (ref 6–23)
CALCIUM SERPL-MCNC: 9.5 MG/DL (ref 8.6–10.3)
CHLORIDE SERPL-SCNC: 102 MMOL/L (ref 98–107)
CO2 SERPL-SCNC: 30 MMOL/L (ref 21–32)
CREAT SERPL-MCNC: 0.86 MG/DL (ref 0.5–1.05)
EGFRCR SERPLBLD CKD-EPI 2021: 69 ML/MIN/1.73M*2
ERYTHROCYTE [DISTWIDTH] IN BLOOD BY AUTOMATED COUNT: 13 % (ref 11.5–14.5)
GLUCOSE SERPL-MCNC: 95 MG/DL (ref 74–99)
HCT VFR BLD AUTO: 37.9 % (ref 36–46)
HGB BLD-MCNC: 12.1 G/DL (ref 12–16)
MCH RBC QN AUTO: 29.9 PG (ref 26–34)
MCHC RBC AUTO-ENTMCNC: 31.9 G/DL (ref 32–36)
MCV RBC AUTO: 94 FL (ref 80–100)
NRBC BLD-RTO: 0 /100 WBCS (ref 0–0)
PLATELET # BLD AUTO: 338 X10*3/UL (ref 150–450)
POTASSIUM SERPL-SCNC: 4.9 MMOL/L (ref 3.5–5.3)
PROT SERPL-MCNC: 6.3 G/DL (ref 6.4–8.2)
RBC # BLD AUTO: 4.05 X10*6/UL (ref 4–5.2)
SODIUM SERPL-SCNC: 137 MMOL/L (ref 136–145)
WBC # BLD AUTO: 11.6 X10*3/UL (ref 4.4–11.3)

## 2024-12-12 PROCEDURE — 2500000004 HC RX 250 GENERAL PHARMACY W/ HCPCS (ALT 636 FOR OP/ED): Performed by: NURSE PRACTITIONER

## 2024-12-12 PROCEDURE — 2500000001 HC RX 250 WO HCPCS SELF ADMINISTERED DRUGS (ALT 637 FOR MEDICARE OP): Performed by: NURSE PRACTITIONER

## 2024-12-12 PROCEDURE — 36415 COLL VENOUS BLD VENIPUNCTURE: CPT | Performed by: INTERNAL MEDICINE

## 2024-12-12 PROCEDURE — RXMED WILLOW AMBULATORY MEDICATION CHARGE

## 2024-12-12 PROCEDURE — 2500000002 HC RX 250 W HCPCS SELF ADMINISTERED DRUGS (ALT 637 FOR MEDICARE OP, ALT 636 FOR OP/ED): Performed by: NURSE PRACTITIONER

## 2024-12-12 PROCEDURE — 85027 COMPLETE CBC AUTOMATED: CPT | Performed by: INTERNAL MEDICINE

## 2024-12-12 PROCEDURE — 2500000001 HC RX 250 WO HCPCS SELF ADMINISTERED DRUGS (ALT 637 FOR MEDICARE OP): Performed by: STUDENT IN AN ORGANIZED HEALTH CARE EDUCATION/TRAINING PROGRAM

## 2024-12-12 PROCEDURE — 80053 COMPREHEN METABOLIC PANEL: CPT | Performed by: INTERNAL MEDICINE

## 2024-12-12 PROCEDURE — 94762 N-INVAS EAR/PLS OXIMTRY CONT: CPT

## 2024-12-12 PROCEDURE — 99232 SBSQ HOSP IP/OBS MODERATE 35: CPT | Performed by: STUDENT IN AN ORGANIZED HEALTH CARE EDUCATION/TRAINING PROGRAM

## 2024-12-12 RX ORDER — BENZONATATE 200 MG/1
200 CAPSULE ORAL 3 TIMES DAILY PRN
Qty: 20 CAPSULE | Refills: 0 | Status: SHIPPED | OUTPATIENT
Start: 2024-12-12

## 2024-12-12 RX ORDER — ACETAMINOPHEN 325 MG/1
650 TABLET ORAL EVERY 4 HOURS PRN
Qty: 30 TABLET | Refills: 0 | Status: SHIPPED | OUTPATIENT
Start: 2024-12-12

## 2024-12-12 RX ORDER — METOPROLOL SUCCINATE 100 MG/1
100 TABLET, EXTENDED RELEASE ORAL 2 TIMES DAILY
Qty: 60 TABLET | Refills: 0 | Status: SHIPPED | OUTPATIENT
Start: 2024-12-12 | End: 2025-01-11

## 2024-12-12 RX ADMIN — CYCLOSPORINE 1 DROP: 0.5 EMULSION OPHTHALMIC at 09:46

## 2024-12-12 RX ADMIN — METOPROLOL SUCCINATE 100 MG: 50 TABLET, EXTENDED RELEASE ORAL at 09:46

## 2024-12-12 RX ADMIN — APIXABAN 5 MG: 5 TABLET, FILM COATED ORAL at 09:46

## 2024-12-12 RX ADMIN — ASPIRIN 81 MG CHEWABLE TABLET 81 MG: 81 TABLET CHEWABLE at 09:46

## 2024-12-12 RX ADMIN — SERTRALINE HYDROCHLORIDE 50 MG: 50 TABLET ORAL at 09:46

## 2024-12-12 RX ADMIN — POLYETHYLENE GLYCOL 3350 17 G: 17 POWDER, FOR SOLUTION ORAL at 09:46

## 2024-12-12 RX ADMIN — LEVOTHYROXINE SODIUM 75 MCG: 0.07 TABLET ORAL at 05:58

## 2024-12-12 RX ADMIN — VALSARTAN 320 MG: 80 TABLET, FILM COATED ORAL at 09:46

## 2024-12-12 ASSESSMENT — COGNITIVE AND FUNCTIONAL STATUS - GENERAL
DAILY ACTIVITIY SCORE: 24
MOBILITY SCORE: 24

## 2024-12-12 ASSESSMENT — PAIN SCALES - GENERAL: PAINLEVEL_OUTOF10: 0 - NO PAIN

## 2024-12-12 NOTE — DISCHARGE SUMMARY
Discharge Diagnosis  Atrial fibrillation with RVR (Multi)    Issues Requiring Follow-Up  Patient fully evaluated 12/12  for   Assessment & Plan  Atrial fibrillation with RVR (Multi)    Elevated troponin    Patient with significant clinical improvement noted, patient medically cleared for discharge today. Patient seen resting in bed with head of bed elevated, no s/s or c/o acute difficulties at this time.   Vital signs for last 24 hours:  Temp:  [36 °C (96.8 °F)-36.9 °C (98.4 °F)] 36.6 °C (97.9 °F)  Heart Rate:  [62-74] 62  Resp:  [18-20] 20  BP: (127-155)/(60-73) 132/73   Medications and labs reviewed-   Results for orders placed or performed during the hospital encounter of 12/08/24 (from the past 24 hours)   CBC   Result Value Ref Range    WBC 11.6 (H) 4.4 - 11.3 x10*3/uL    nRBC 0.0 0.0 - 0.0 /100 WBCs    RBC 4.05 4.00 - 5.20 x10*6/uL    Hemoglobin 12.1 12.0 - 16.0 g/dL    Hematocrit 37.9 36.0 - 46.0 %    MCV 94 80 - 100 fL    MCH 29.9 26.0 - 34.0 pg    MCHC 31.9 (L) 32.0 - 36.0 g/dL    RDW 13.0 11.5 - 14.5 %    Platelets 338 150 - 450 x10*3/uL   Comprehensive Metabolic Panel   Result Value Ref Range    Glucose 95 74 - 99 mg/dL    Sodium 137 136 - 145 mmol/L    Potassium 4.9 3.5 - 5.3 mmol/L    Chloride 102 98 - 107 mmol/L    Bicarbonate 30 21 - 32 mmol/L    Anion Gap 10 10 - 20 mmol/L    Urea Nitrogen 15 6 - 23 mg/dL    Creatinine 0.86 0.50 - 1.05 mg/dL    eGFR 69 >60 mL/min/1.73m*2    Calcium 9.5 8.6 - 10.3 mg/dL    Albumin 3.2 (L) 3.4 - 5.0 g/dL    Alkaline Phosphatase 55 33 - 136 U/L    Total Protein 6.3 (L) 6.4 - 8.2 g/dL    AST 12 9 - 39 U/L    Bilirubin, Total 0.4 0.0 - 1.2 mg/dL    ALT 8 7 - 45 U/L      Patient recently received an antibiotic (last 12 hours)       None           Susceptibility data from last 90 days.  Collected Specimen Info Organism   12/09/24 Fluid from [facility] Normal throat francesca      No acute issues overnight.  No chest pain or shortness of breath.  Patient seen by cardiology today-  December 12, 2024  Okay to discharge from cardiac standpoint on medications. Blood pressure 132/73, heart rate 62, stable. Patient anxious to return home, will continue with current doses, updated prescriptions sent to pharmacy, eliquis coupon, meds to beds.   Follow-up in cardiology clinic in couple of weeks.    Continue aggressive pulmonary hygiene and oral hygiene. Off loading as tolerated for skin integrity.   Plan discussed with interdisciplinary team, ok to discharge, will continue current and repeat labs in the AM if patient still hospitalized. Patient aware and agreeable to current plan, continue plan as above.     I spent 30 minutes on the date of the service which included preparing to see the patient, face-to-face patient care, completing clinical documentation, obtaining and/or reviewing separately obtained history, performing a medically appropriate examination, counseling and educating the patient/family/caregiver, ordering medications, tests, or procedures, communicating with other HCPs (not separately reported), independently interpreting results (not separately reported), communicating results to the patient/family/caregiver, and care coordination (not separately reported    Discharge Meds     Medication List      START taking these medications     benzonatate 200 mg capsule; Commonly known as: Tessalon; Take 1 capsule   (200 mg) by mouth 3 times a day as needed for cough. Do not crush or chew.   Deep Sea Nasal 0.65 % nasal spray; Generic drug: sodium chloride;   Administer 1 spray into each nostril 4 times a day as needed for   congestion.   Eliquis 5 mg tablet; Generic drug: apixaban; Take 1 tablet (5 mg) by   mouth every 12 hours.   estradiol 0.01 % (0.1 mg/gram) vaginal cream; Commonly known as: Estrace     CHANGE how you take these medications     * acetaminophen 325 mg tablet; Commonly known as: Tylenol; What changed:   Another medication with the same name was added. Make sure you understand    how and when to take each.   * acetaminophen 325 mg tablet; Commonly known as: Tylenol; Take 2   tablets (650 mg) by mouth every 4 hours if needed for mild pain (1 - 3).;   What changed: You were already taking a medication with the same name, and   this prescription was added. Make sure you understand how and when to take   each.   metoprolol succinate  mg 24 hr tablet; Commonly known as:   Toprol-XL; Take 1 tablet (100 mg) by mouth 2 times a day. Do not crush or   chew.; What changed: medication strength, how much to take, additional   instructions   Repatha SureClick 140 mg/mL injection; Generic drug: evolocumab; Inject   1 mL (140 mg) under the skin every 14 (fourteen) days.  * This list has 2 medication(s) that are the same as other medications   prescribed for you. Read the directions carefully, and ask your doctor or   other care provider to review them with you.     CONTINUE taking these medications     ALPRAZolam 0.25 mg tablet; Commonly known as: Xanax   aspirin 81 mg chewable tablet   cycloSPORINE 0.05 % ophthalmic emulsion; Commonly known as: Restasis   doxazosin 2 mg tablet; Commonly known as: Cardura   ergocalciferol 1.25 MG (64971 UT) capsule; Commonly known as: Vitamin   D-2   famotidine 40 mg tablet; Commonly known as: Pepcid   levoFLOXacin 500 mg tablet; Commonly known as: Levaquin   levothyroxine 75 mcg tablet; Commonly known as: Synthroid, Levoxyl   methylPREDNISolone 4 mg tablets; Commonly known as: Medrol Dospak   MULTIPLE VITAMINS ORAL   sertraline 50 mg tablet; Commonly known as: Zoloft     STOP taking these medications     nitroglycerin 0.3 mg SL tablet; Commonly known as: Nitrostat   olmesartan 40 mg tablet; Commonly known as: BENIcar   spironolactone 25 mg tablet; Commonly known as: Aldactone   triamcinolone 0.1 % ointment; Commonly known as: Kenalog       Test Results Pending At Discharge  Pending Labs       Order Current Status    Blood Culture Preliminary result    Blood Culture  Preliminary result            Hospital Course           Jemal Helton MD   Physician  Internal Medicine     Progress Notes      Signed     Date of Service: 12/11/2024  4:29 PM     Signed       Expand All Collapse All    Kristin Martines is a 79 y.o. female on day 3 of admission presenting with Atrial fibrillation with RVR (Multi).           Subjective  Pt awake, conversant, and A&O x 3. Pt's heart rate was in the 90's upon examination. Pt stated that she has been experiencing SOB and labored breathing. Today's labs read a glucose of 89, sodium of 138, WBC of 15.2, RBC of 3.97, hemoglobin of 11.8, and hematocrit of 37.6. Cardiology was consulted and provided their input. Still awaiting the results of the echocardiogram which will help determine if a heart cath is needed. Pt's metoprolol has been increased to 100 mg to help better manage her atrial fibrillation. Pt is currently taking Xarelto 20 mg daily for protection against thromboembolism. As per pharmacy - her levofloxacin 750 mg IV q24hrs was adjusted to 750 mg IV q48hrs to better treat her pneumonia and is better for her renal function. Plan discussed with interdisciplinary team, continue current and repeat labs in the AM.      Discharge planning discussed with patient and care team. Therapy evaluations ordered.  Patient aware and agreeable to current plan, continue plan as above.      I spent a total of 50 minutes on the date of the service which included preparing to see the patient, face-to-face patient care, completing clinical documentation, obtaining and/or reviewing separately obtained history, performing a medically appropriate examination, counseling and educating the patient/family/caregiver, ordering medications, tests, or procedures, communicating with other HCPs (not separately reported), independently interpreting results (not separately reported), communicating results to the patient/family/caregiver, and care coordination (not separately  reported).                  Objective  Last Recorded Vitals  /90   Pulse 71   Temp 36.2 °C (97.2 °F)   Resp 19   Wt 73.8 kg (162 lb 11.2 oz)   SpO2 96%   Intake/Output last 3 Shifts:  No intake or output data in the 24 hours ending 12/11/24 1629        Admission Weight  Weight: 72.6 kg (160 lb) (12/08/24 1000)     Daily Weight  12/09/24 : 73.8 kg (162 lb 11.2 oz)     Image Results  Cardiac Catheterization Procedure     [facility], Cath Lab, [address] [place] [id number]     Cardiovascular Catheterization Report     Patient Name:      DOUGLAS MELENDEZ      Performing Physician:  [id number] Elizabeth MD  Study Date:        12/10/2024            Verifying Physician:   Simone Elizabeth MD  MRN/PID:           [id number]              Cardiologist/Co-Scrub:  Accession#:        [id number]          Ordering Provider:     [id number] MORENO  Date of Birth/Age: 1945 / 79 years Cardiologist:  Gender:            F                     Fellow:  Encounter#:        [id number]            Surgeon:        Study: Left Heart Cath with Grafts        Indications:  DOUGLAS MELENDEZ is a 79 year old female who presents with dyslipidemia, hypertension, prior coronary artery bypass graft surgery and a chest pain assessment of atypical angina. NSTE - ACS.     Appropriate Use Criteria:  Non ST elevation myocardial infarction with high risk score; AUC score = 9.     Procedure Description Comments:  Informed consent was obtained.  Patient was brought to the Cath Lab and prepped and draped in usual fashion.  Under moderate sedation and after giving 2% local lidocaine, right femoral arterial access was obtained using micropuncture technique and a 6 [place] sheath was placed in.  Using JL 4 and JR4  catheters, coronary angiography and left heart catheterization and assessment of bypass grafts were done.  Manual pressure was restarted hemostasis in right groin access site.  Patient tolerated the procedure well and transferred to recovery area for further care and monitoring.  Report was given to Dr. Castro.     Coronary Angiography:  The coronary circulation is right dominant.     Coronary Angiography Comments:  Luminal irregularities throughout left main     Proximal to mid LAD multiple 30 to 50% lesions. Luminal irregularities in the rest of the vessel. No competitive flow in LAD     Luminal irregularities throughout left circumflex     Mid RCA  with left-to-right collaterals     Patent SVG sequential graft to PDA and PLV     Patent SVG to diagonal branch     Atretic LIMA     Right dominant coronary artery system     LVEDP 7 mmHg without significant gradient across aortic valve.        Hemo Personnel:  +-----------------------+---------+  Name                   Duty       +-----------------------+---------+  Lambert Elizabeth MDPROC MD 1  +-----------------------+---------+        Fluoroscopy Time:  +--------------------------+--------+  X-Ray Summary Fluoro Time:5.10 min  +--------------------------+--------+        +----------+--------+  Contrast: Dose:     +----------+--------+  Omnipaque:55.00 ml  +----------+--------+        Hemodynamic Pressures:     +----+---------------+----------+-------------+--------------+-------+---------+  Site   Date Time   Phase NameSystolic mmHgDiastolic mmHgED mmHgMean mmHg  +----+---------------+----------+-------------+--------------+-------+---------+    AO     12/10/2024   O2 REST          173            81             116           2:27:43 PM                                                       +----+---------------+----------+-------------+--------------+-------+---------+    LV     12/10/2024   O2 REST          141              4     10                    2:30:38 PM                                                       +----+---------------+----------+-------------+--------------+-------+---------+    LV     12/10/2024   O2 REST          148             2      7                    2:30:43 PM                                                       +----+---------------+----------+-------------+--------------+-------+---------+   LVp     12/10/2024   O2 REST          136             3      8                    2:30:46 PM                                                       +----+---------------+----------+-------------+--------------+-------+---------+   AOp     12/10/2024   O2 REST          169            86             121           2:30:51 PM                                                       +----+---------------+----------+-------------+--------------+-------+---------+    AO     12/10/2024   O2 REST          179            88             125           2:31:05 PM                                                       +----+---------------+----------+-------------+--------------+-------+---------+    AO     12/10/2024   O2 REST          186            88             125           2:33:58 PM                                                       +----+---------------+----------+-------------+--------------+-------+---------+    AO     12/10/2024   O2 REST          198            94             134           2:37:03 PM                                                       +----+---------------+----------+-------------+--------------+-------+---------+        Complications:  No in-lab complications observed.     Cardiac Cath Post Procedure Notes:  Post Procedure Diagnosis: Coronary artery disease as above.  Blood Loss:               Estimated blood loss during the procedure was 5 mls.  Specimens Removed:        Number of  specimen(s) removed: none.        Recommendations:  Maximize medical therapy.  Agressive risk factor modification efforts.  Follow-up with cardiology clinic.  Medical management of coronary artery disease.     ____________________________________________________________________________________  CONCLUSIONS:   1. Luminal irregularities throughout left main            Proximal to mid LAD multiple 30 to 50% lesions. Luminal irregularities in the rest of the vessel. No competitive flow in LAD            Luminal irregularities throughout left circumflex            Mid RCA  with left-to-right collaterals            Patent SVG sequential graft to PDA and PLV            Patent SVG to diagonal branch            Atretic LIMA            Right dominant coronary artery system            LVEDP 7 mmHg without significant gradient across aortic valve.     ICD 10 Codes:  Non ST elevation (NSTEMI) myocardial infarction-I21.4     CPT Codes:  Left Heart Cath Bypass Graft w ventriculography and coronary angio([facility])-66791; Moderate Sedation Services 2nd additional 15 minutes patient >5 years-08614; Moderate Sedation Services 1st additional 15 minutes patient >5 years-19392; Moderate Sedation Services 3rd additional 15 minutes patient >5 years-58501     [id number] Lambert Elizabeth MD  Performing Physician  Electronically signed by [id number] Lambert Elizabeth MD on 12/10/2024 at 4:22:26  PM           ** Final **        Physical Exam     Relevant Results                       Assessment/Plan                       Assessment & Plan  Atrial fibrillation with RVR (Multi)     Elevated troponin           Jemal Helton MD   Physician  Internal Medicine     H&P      Addendum     Date of Service: 12/8/2024  1:37 PM      Addendum        Expand All Collapse All    History Of Present Illness  Kristin Martines is a 79 y.o. female with a past medical history of diastolic heart failure, CABG x 4 in 2019, HLD, palpitations, GERD presenting with concern  "for generalized weakness.  Patient states that since last Tuesday, she has been sick with a “chest infection.\"  She states that initially, she was utilizing Mucinex, Tylenol, and elderberry, but this was not doing the trick.  She notified Dr. Helton of her symptoms, and was subsequently prescribed Medrol Dosepak and levofloxacin.  Patient states she took only 1 dose of each yesterday, and then decided to present to the emergency department for further evaluation.  She states she feels warm, but when she took her temperature was 99.3 °F.  She is also endorsing a cough productive of green mucus, green rhinorrhea, a heavy feeling in her chest, occasional palpitations that started with her infection, shortness of breath.  She states the heaviness in her chest is located the center, but denies active chest pain.  She states that just started this morning and she has never experienced it before.  She states it gets worse with cough.  She otherwise denies dizziness, syncope, leg swelling, nausea/vomiting, diarrhea, urinary symptoms, history of DVT/PE, hemoptysis, tobacco use, history of malignancy, recent surgery/recent travel/long car rides.  She states she does not use caffeine, and drinks everything caffeine free.  She does states she has been under some stress lately with work, as she works on commission.  Does not smoke or drink alcohol.     ED course: On arrival, patient's BP 67/46, heart rate 68, respirations 18, afebrile, saturating 96% on room air.  BP did go up to 114/74.  Heart rate now 140.  Respirations 26.  Labs and imaging performed, revealing creatinine 1.09 and BUN 18 (0.87 and 16 on 11/16).  Calcium 10.9.  Lactate WNL.  BNP elevated at 470.  Initial troponin 12, delta troponin 61.  TSH WNL.  White count elevated at 20.4.  Blood cultures ordered and pending.  Chest x-ray shows questionable retrocardiac infiltrate.  Patient given 5 mg Lopressor, 250 mcg digoxin.  IV Levaquin initiated.  CT angio head and " neck and CT head ordered and pending.  Urinalysis ordered and pending.  Patient to be admitted inpatient under Dr. Helton.     Past Medical History  Medical History           Past Medical History:   Diagnosis Date    Encounter for screening for malignant neoplasm of vagina       Vaginal Pap smear    Other specified noninflammatory disorders of vulva and perineum       Vulval lesion    Personal history of other medical treatment 11/01/2019     H/O bone density study    Personal history of other medical treatment       H/O mammogram            Surgical History  Surgical History             Past Surgical History:   Procedure Laterality Date    FOOT SURGERY   12/02/2019     Foot Surgery    OTHER SURGICAL HISTORY   08/09/2021     Cardiac catheterization    OTHER SURGICAL HISTORY   04/29/2020     Coronary artery bypass graft    OTHER SURGICAL HISTORY   12/02/2019     Wrist Surgery    OTHER SURGICAL HISTORY   12/02/2019     Biopsy Vulvar            Social History  She reports that she has never smoked. She has never used smokeless tobacco. She reports current alcohol use. She reports that she does not use drugs.     Family History  Family History               Family History   Problem Relation Name Age of Onset    Hypertension Mother        Heart attack Father        Asthma Other family hx      Other (cardiac disorder) Other family hx      Hypertension Other family hx              Allergies  Amoxicillin-pot clavulanate, Cephalexin, Ciprofloxacin, Codeine, Erythromycin base, Esomeprazole magnesium, Ezetimibe, Oxycodone-acetaminophen, and Sulfamethoxazole-trimethoprim     Review of Systems  Negative except as stated above in HPI     Physical Exam  Constitutional:       General: She is not in acute distress.     Appearance: Normal appearance. She is not toxic-appearing.      Comments: Patient is alert and oriented x 3 upon my arrival.  She is in normal sinus rhythm.  She frequently stops our interview to cough, but is not  "displaying conversational dyspnea.   HENT:      Head: Normocephalic and atraumatic.      Nose: Congestion and rhinorrhea present.      Mouth/Throat:      Mouth: Mucous membranes are moist.      Pharynx: Oropharynx is clear.   Eyes:      Extraocular Movements: Extraocular movements intact.      Conjunctiva/sclera: Conjunctivae normal.      Pupils: Pupils are equal, round, and reactive to light.   Cardiovascular:      Rate and Rhythm: Normal rate and regular rhythm.      Pulses: Normal pulses.   Pulmonary:      Effort: Pulmonary effort is normal.      Breath sounds: Normal breath sounds.   Abdominal:      General: Abdomen is flat. Bowel sounds are normal.      Palpations: Abdomen is soft.      Tenderness: There is no abdominal tenderness.   Musculoskeletal:         General: Normal range of motion.      Cervical back: Normal range of motion.      Right lower leg: No edema.      Left lower leg: No edema.   Skin:     General: Skin is warm and dry.   Neurological:      General: No focal deficit present.      Mental Status: She is alert and oriented to person, place, and time. Mental status is at baseline.   Psychiatric:         Mood and Affect: Mood normal.         Behavior: Behavior normal.         Thought Content: Thought content normal.            Last Recorded Vitals  Blood pressure 97/74, pulse 69, temperature 36 °C (96.8 °F), resp. rate (!) 40, height 1.626 m (5' 4\"), weight 72.6 kg (160 lb), SpO2 97%.     Relevant Results     Scheduled medications     Scheduled Medications   ALPRAZolam, 0.25 mg, oral, Nightly  aspirin, 81 mg, oral, Daily  cycloSPORINE, 1 drop, Both Eyes, BID  doxazosin, 4 mg, oral, Nightly  famotidine, 40 mg, oral, Nightly  heparin (porcine), 5,000 Units, subcutaneous, q8h  levoFLOXacin, 750 mg, intravenous, q24h  levothyroxine, 75 mcg, oral, Once per day on Sunday Tuesday Wednesday Thursday Saturday  metoprolol succinate XL, 75 mg, oral, BID  perflutren lipid microspheres, 0.5-10 mL of dilution, " intravenous, Once in imaging  perflutren protein A microsphere, 0.5 mL, intravenous, Once in imaging  polyethylene glycol, 17 g, oral, Daily  sertraline, 50 mg, oral, Daily  sulfur hexafluoride microsphr, 2 mL, intravenous, Once in imaging  valsartan, 320 mg, oral, Daily         Continuous medications  Continuous Medications          PRN medications  PRN Medications   PRN medications: acetaminophen **OR** acetaminophen **OR** acetaminophen, metoprolol, oxygen         CT angio head and neck w and wo IV contrast     Result Date: 12/8/2024  Interpreted By:  Mary Grace Byrd, STUDY: CT ANGIO HEAD AND NECK W AND WO IV CONTRAST;  12/8/2024 12:03 pm   INDICATION: Signs/Symptoms:vertigo, new a.fib, initial screening for cerebellar syndrome.     COMPARISON: Same day unenhanced head CT which is reported separately.   ACCESSION NUMBER(S): [id number]   ORDERING CLINICIAN: LUCIA ECHEVERRIA   TECHNIQUE: Subsequently, 90 ML of Omnipaque 350 was administered intravenously and axial images of the head and neck were acquired.  Coronal, sagittal, and 3-D reconstructions were provided for review.   3D reconstructions were performed utilizing independent software.   FINDINGS:     CTA HEAD FINDINGS:   Anterior circulation: There are diffuse calcifications of the intracranial segments of the internal carotid arteries. There appears to be at least mild narrowing bilaterally. There is subtle focal outpouching projecting inferomedially from the communicating segment of the right ICA measuring less than 2 mm on image 115 of 430. The proximal anterior cerebral arteries are patent. There is subtle narrowing of the proximal M1 segment of the right MCA. There is mild irregularity and narrowing of M2 and more distal MCA branches bilaterally.   Posterior circulation: The V4 segment on the right is dominant. There is mild irregularity and narrowing on the left. The basilar artery is patent. Focal outpouching projecting superior laterally on the left  from the basilar tip measuring approximately 2 mm on image 131 of 430 is favored to be related to an otherwise markedly diminutive P1 segment of the left PCA although an aneurysm could also give this appearance. Areas of irregularity and narrowing of the PCAs are suspected bilaterally.   CTA NECK FINDINGS:   Evaluation of the aortic arch and arch vessels is limited due to streak artifact from adjacent dense venous contrast. There are mild atherosclerotic changes of the visualized portion of the arch.   Carotid vessels:   The proximal common carotid arteries are degraded by artifact. They otherwise are patent. The carotid bifurcations demonstrate no measurable stenosis. There is tortuosity of the cervical ICAs bilaterally with subtle luminal irregularity but no measurable stenosis.   Vertebral vessels:   The cervical segments of the vertebral arteries are tortuous. There is subtle multifocal narrowing bilaterally due to impression from degenerative changes of the cervical spine.   Mucosal thickening and fluid are noted within the maxillary sinuses.   There are ground-glass opacities and irregular spiculated nodules in the right upper lobe, incompletely included on the obtained field-of-view but measuring up to at least 8-9 mm.   There is reversal of the normal cervical lordosis. There are multilevel degenerative changes of the cervical spine with associated central canal and neuroforaminal stenosis.          1. There is multifocal irregularity and narrowing of the anterior and posterior circulation vasculature without proximal large branch vessel cutoff. Please note MRI with diffusion-weighted imaging would be a more sensitive means of assessing for acute ischemic injury. 2. Focal outpouching arising from the communicating segment of the right ICA could represent an infundibular origin of an otherwise very small, poorly seen vessel or sub 2 mm aneurysm. Apparent focal outpouching projecting anteriorly and to the left  from the basilar tip may represent an otherwise markedly diminutive P1 segment of the left PCA although an aneurysm is not excluded. 3. Mild, multilevel narrowing of the cervical segments of the vertebral arteries due to impression from degenerative changes of the cervical spine. 4. Subtle luminal irregularity of the mid to distal cervical ICAs superimposed on vascular tortuosity is nonspecific but may be seen with FMD among other etiologies. There is no measurable stenosis. 5. Incompletely imaged irregular nodules in the right upper lobe with patchy ground-glass opacities are nonspecific and could be infectious or inflammatory in nature. Dedicated chest CT is recommended for further evaluation.   MACRO: None   Signed by: Mary Grace Byrd 12/8/2024 1:08 PM Dictation workstation:   [id number]     CT head wo IV contrast     Result Date: 12/8/2024  Interpreted By:  Gi Bloom, STUDY: CT HEAD WO IV CONTRAST;  12/8/2024 12:03 pm   INDICATION: Signs/Symptoms:dizziness.     COMPARISON: 05/24/2022   ACCESSION NUMBER(S): [id number]   ORDERING CLINICIAN: LUCIA ECHEVERRIA   TECHNIQUE: Unenhanced CT images of the head were obtained.   FINDINGS: The ventricles, cisterns and sulci are prominent, consistent with diffuse volume loss. There are areas of nonspecific white matter hypodensity, which are probably age related or microvascular in nature. These findings are similar to the prior exam. There is no acute intracranial hemorrhage, mass effect or midline shift. No extraaxial fluid collection.   No focal calvarial lesion.   Mild left ethmoid and bilateral maxillary sinus mucosal thickening. Small amount of fluid in the left maxillary sinus.            No acute intracranial hemorrhage or mass-effect.   Multifocal sinus mucosal thickening and small amount of fluid in the left maxillary sinus.   MACRO: None.   Signed by: Gi Bloom 12/8/2024 12:18 PM Dictation workstation:   [id number]     XR chest 2 views     Result Date:  12/8/2024  Interpreted By:  Gi Bloom, STUDY: XR CHEST 2 VIEWS;  12/8/2024 10:28 am   INDICATION: Signs/Symptoms:recent chest infection, burning in chest.     COMPARISON: 12/12/2019   ACCESSION NUMBER(S): [id number]   ORDERING CLINICIAN: LUCIA ECHEVERRIA   FINDINGS: Artifact from overlying monitoring leads noted. Questionable retrocardiac infiltrate versus confluence of shadows. No pleural effusion or pneumothorax. The cardiac silhouette is upper limits of normal in size status post sternotomy.        Questionable retrocardiac infiltrate. Clinical correlation and follow-up recommended.   MACRO: None.   Signed by: Gi Bloom 12/8/2024 10:38 AM Dictation workstation:   [id number]                Results for orders placed or performed during the hospital encounter of 12/08/24 (from the past 24 hours)   CBC and Auto Differential   Result Value Ref Range     WBC 20.4 (H) 4.4 - 11.3 x10*3/uL     nRBC 0.0 0.0 - 0.0 /100 WBCs     RBC 4.40 4.00 - 5.20 x10*6/uL     Hemoglobin 13.6 12.0 - 16.0 g/dL     Hematocrit 40.8 36.0 - 46.0 %     MCV 93 80 - 100 fL     MCH 30.9 26.0 - 34.0 pg     MCHC 33.3 32.0 - 36.0 g/dL     RDW 13.2 11.5 - 14.5 %     Platelets 308 150 - 450 x10*3/uL     Neutrophils % 83.8 40.0 - 80.0 %     Immature Granulocytes %, Automated 1.1 (H) 0.0 - 0.9 %     Lymphocytes % 8.7 13.0 - 44.0 %     Monocytes % 6.0 2.0 - 10.0 %     Eosinophils % 0.1 0.0 - 6.0 %     Basophils % 0.3 0.0 - 2.0 %     Neutrophils Absolute 17.07 (H) 1.60 - 5.50 x10*3/uL     Immature Granulocytes Absolute, Automated 0.22 0.00 - 0.50 x10*3/uL     Lymphocytes Absolute 1.77 0.80 - 3.00 x10*3/uL     Monocytes Absolute 1.23 (H) 0.05 - 0.80 x10*3/uL     Eosinophils Absolute 0.03 0.00 - 0.40 x10*3/uL     Basophils Absolute 0.06 0.00 - 0.10 x10*3/uL   Comprehensive metabolic panel   Result Value Ref Range     Glucose 127 (H) 74 - 99 mg/dL     Sodium 137 136 - 145 mmol/L     Potassium 4.0 3.5 - 5.3 mmol/L     Chloride 101 98 - 107 mmol/L      Bicarbonate 26 21 - 32 mmol/L     Anion Gap 14 10 - 20 mmol/L     Urea Nitrogen 18 6 - 23 mg/dL     Creatinine 1.09 (H) 0.50 - 1.05 mg/dL     eGFR 52 (L) >60 mL/min/1.73m*2     Calcium 10.9 (H) 8.6 - 10.3 mg/dL     Albumin 3.9 3.4 - 5.0 g/dL     Alkaline Phosphatase 77 33 - 136 U/L     Total Protein 7.6 6.4 - 8.2 g/dL     AST 15 9 - 39 U/L     Bilirubin, Total 0.7 0.0 - 1.2 mg/dL     ALT 13 7 - 45 U/L   B-Type Natriuretic Peptide   Result Value Ref Range      (H) 0 - 99 pg/mL   Troponin I, High Sensitivity, Initial   Result Value Ref Range     Troponin I, High Sensitivity 12 0 - 13 ng/L   TSH with reflex to Free T4 if abnormal   Result Value Ref Range     Thyroid Stimulating Hormone 3.03 0.44 - 3.98 mIU/L   Magnesium   Result Value Ref Range     Magnesium 1.88 1.60 - 2.40 mg/dL   BLOOD GAS VENOUS FULL PANEL   Result Value Ref Range     POCT pH, Venous 7.38 7.33 - 7.43 pH     POCT pCO2, Venous 46 41 - 51 mm Hg     POCT pO2, Venous 17 (L) 35 - 45 mm Hg     POCT SO2, Venous 24 (L) 45 - 75 %     POCT Oxy Hemoglobin, Venous 23.9 (L) 45.0 - 75.0 %     POCT Hematocrit Calculated, Venous 38.0 36.0 - 46.0 %     POCT Sodium, Venous 137 136 - 145 mmol/L     POCT Potassium, Venous 4.4 3.5 - 5.3 mmol/L     POCT Chloride, Venous 100 98 - 107 mmol/L     POCT Ionized Calicum, Venous 1.37 (H) 1.10 - 1.33 mmol/L     POCT Glucose, Venous 130 (H) 74 - 99 mg/dL     POCT Lactate, Venous 1.9 0.4 - 2.0 mmol/L     POCT Base Excess, Venous 1.6 -2.0 - 3.0 mmol/L     POCT HCO3 Calculated, Venous 27.2 (H) 22.0 - 26.0 mmol/L     POCT Hemoglobin, Venous 12.6 12.0 - 16.0 g/dL     POCT Anion Gap, Venous 14.0 10.0 - 25.0 mmol/L     Patient Temperature 37.0 degrees Celsius     FiO2 21 %   Lactate   Result Value Ref Range     Lactate 1.7 0.4 - 2.0 mmol/L   Coagulation Screen   Result Value Ref Range     Protime 15.8 (H) 9.8 - 12.8 seconds     INR 1.4 (H) 0.9 - 1.1     aPTT 26 (L) 27 - 38 seconds   Troponin, High Sensitivity, 1 Hour   Result  Value Ref Range     Troponin I, High Sensitivity 61 (HH) 0 - 13 ng/L               Assessment/Plan     Assessment & Plan  Atrial fibrillation with RVR (Multi)     Atrial fibrillation with RVR  Elevated high-sensitivity troponins  Elevated BNP  History of diastolic heart failure  CABG x 4 (2019)  -Cardiology consult and recommendations appreciated  -Patient found to be in atrial fibrillation with RVR, new onset in the ED.  EKG, per ED physician, shows 134 bpm, atrial fibrillation, normal axis, QRS 84, QTc 471, intermittent elevations in lead III less than 1 mm, otherwise normal ST and T wave pattern with no evidence of acute ischemia or other acute findings  -Patient initially given 5 mg IV Lopressor which was unsuccessful, patient then given 250 mcg of digoxin per Dr. Helotn.   -Patient in NSR at the time of my interview with HR in the 70's  -Last echo from 2019, echo added on  -Patient on 75 mg metoprolol 2 times daily at home, continue.  Adjustments per cardiology.  IV Lopressor as needed  -Continue home aspirin, doxazosin  -[id number] score 6, initiation of anticoagulation per cardiology/attending     Pneumonia  Shortness of breath  Leukocytosis  -Chest x-ray shows questionable retrocardiac infiltrate  -Will empirically treat for pneumonia, given patient states she has been sick with a “ chest infection” since last Tuesday and was recently on steroids/antibiotics.  Patient also with leukocytosis  -Continue IV levofloxacin given allergies to amoxicillin, erythromycin, cephalexin  -DuoNebs and albuterol as needed, oxygen as needed  -Strep pneumo and Legionella antigen, Pro-Yehuda added on and pending  -Following blood cultures and respiratory cultures     Acute kidney injury  Hypercalcemia  -Creatinine 1.09 today, 0.87 on 11/16  -Calcium 10.9 today, hold calcium supplements  -Avoid nephrotoxic medications, monitor with BMP     GERD  -Continue home medications as ordered     DVT prophylaxis  -Heparin  -SCDs        I  spent 65 minutes in the professional and overall care of this patient.  Patient fully evaluated and plan as above     Chikis Barone PA-C                  Revision History       Patient fully evaluated  12/09  for Principal Problem:    Atrial fibrillation with RVR (Multi)  Active Problems:    Elevated troponin    Patient seen resting in bed with head of bed elevated, no s/s or c/o acute difficulties at this time.  Vital signs for last 24 hours Temp:  [36.2 °C (97.2 °F)-37.1 °C (98.8 °F)] 36.2 °C (97.2 °F)  Heart Rate:  [67-75] 71  BP: (116-147)/(63-90) 116/90    No intake/output data recorded.  Patient still requiring frequent cardiac and SPO2 monitoring. Continue aggressive pulmonary hygiene and oral hygiene. Off loading as tolerated for skin integrity. Medications and labs reviewed-         Results for orders placed or performed during the hospital encounter of 12/08/24 (from the past 24 hours)   Comprehensive Metabolic Panel   Result Value Ref Range     Glucose 101 (H) 74 - 99 mg/dL     Sodium 137 136 - 145 mmol/L     Potassium 5.2 3.5 - 5.3 mmol/L     Chloride 103 98 - 107 mmol/L     Bicarbonate 30 21 - 32 mmol/L     Anion Gap 9 (L) 10 - 20 mmol/L     Urea Nitrogen 16 6 - 23 mg/dL     Creatinine 0.85 0.50 - 1.05 mg/dL     eGFR 70 >60 mL/min/1.73m*2     Calcium 9.8 8.6 - 10.3 mg/dL     Albumin 3.2 (L) 3.4 - 5.0 g/dL     Alkaline Phosphatase 60 33 - 136 U/L     Total Protein 6.3 (L) 6.4 - 8.2 g/dL     AST 14 9 - 39 U/L     Bilirubin, Total 0.4 0.0 - 1.2 mg/dL     ALT 8 7 - 45 U/L   CBC   Result Value Ref Range     WBC 9.3 4.4 - 11.3 x10*3/uL     nRBC 0.0 0.0 - 0.0 /100 WBCs     RBC 4.01 4.00 - 5.20 x10*6/uL     Hemoglobin 12.1 12.0 - 16.0 g/dL     Hematocrit 37.9 36.0 - 46.0 %     MCV 95 80 - 100 fL     MCH 30.2 26.0 - 34.0 pg     MCHC 31.9 (L) 32.0 - 36.0 g/dL     RDW 13.2 11.5 - 14.5 %     Platelets 345 150 - 450 x10*3/uL      Patient recently received an antibiotic (last 12 hours)         None             Pt  awake, conversant, and A&O x 3. Pt's heart rate was in the 90's upon examination. Pt stated that she has been experiencing SOB and labored breathing. Today's labs read a glucose of 89, sodium of 138, WBC of 15.2, RBC of 3.97, hemoglobin of 11.8, and hematocrit of 37.6. Cardiology was consulted and provided their input. Still awaiting the results of the echocardiogram which will help determine if a heart cath is needed. Pt's metoprolol has been increased to 100 mg to help better manage her atrial fibrillation. Pt is currently taking Xarelto 20 mg daily for protection against thromboembolism. As per pharmacy - her levofloxacin 750 mg IV q24hrs was adjusted to 750 mg IV q48hrs to better treat her pneumonia and is better for her renal function. Plan discussed with interdisciplinary team, continue current and repeat labs in the AM.      Discharge planning discussed with patient and care team. Therapy evaluations ordered.  Patient aware and agreeable to current plan, continue plan as above.      I spent a total of 50 minutes on the date of the service which included preparing to see the patient, face-to-face patient care, completing clinical documentation, obtaining and/or reviewing separately obtained history, performing a medically appropriate examination, counseling and educating the patient/family/caregiver, ordering medications, tests, or procedures, communicating with other HCPs (not separately reported), independently interpreting results (not separately reported), communicating results to the patient/family/caregiver, and care coordination (not separately reported).        Patient fully evaluated  12/10  for Principal Problem:    Atrial fibrillation with RVR (Multi)  Active Problems:    Elevated troponin    Patient seen resting in bed with head of bed elevated, no s/s or c/o acute difficulties at this time.  Vital signs for last 24 hours Temp:  [36.2 °C (97.2 °F)-37.1 °C (98.8 °F)] 36.2 °C (97.2 °F)  Heart Rate:   [67-75] 71  BP: (116-147)/(63-90) 116/90    No intake/output data recorded.  Patient still requiring frequent cardiac and SPO2 monitoring. Continue aggressive pulmonary hygiene and oral hygiene. Off loading as tolerated for skin integrity. Medications and labs reviewed-         Results for orders placed or performed during the hospital encounter of 12/08/24 (from the past 24 hours)   Comprehensive Metabolic Panel   Result Value Ref Range     Glucose 101 (H) 74 - 99 mg/dL     Sodium 137 136 - 145 mmol/L     Potassium 5.2 3.5 - 5.3 mmol/L     Chloride 103 98 - 107 mmol/L     Bicarbonate 30 21 - 32 mmol/L     Anion Gap 9 (L) 10 - 20 mmol/L     Urea Nitrogen 16 6 - 23 mg/dL     Creatinine 0.85 0.50 - 1.05 mg/dL     eGFR 70 >60 mL/min/1.73m*2     Calcium 9.8 8.6 - 10.3 mg/dL     Albumin 3.2 (L) 3.4 - 5.0 g/dL     Alkaline Phosphatase 60 33 - 136 U/L     Total Protein 6.3 (L) 6.4 - 8.2 g/dL     AST 14 9 - 39 U/L     Bilirubin, Total 0.4 0.0 - 1.2 mg/dL     ALT 8 7 - 45 U/L   CBC   Result Value Ref Range     WBC 9.3 4.4 - 11.3 x10*3/uL     nRBC 0.0 0.0 - 0.0 /100 WBCs     RBC 4.01 4.00 - 5.20 x10*6/uL     Hemoglobin 12.1 12.0 - 16.0 g/dL     Hematocrit 37.9 36.0 - 46.0 %     MCV 95 80 - 100 fL     MCH 30.2 26.0 - 34.0 pg     MCHC 31.9 (L) 32.0 - 36.0 g/dL     RDW 13.2 11.5 - 14.5 %     Platelets 345 150 - 450 x10*3/uL      Patient recently received an antibiotic (last 12 hours)         Date/Time Action Medication Dose Rate     12/10/24 0941 New Bag    levoFLOXacin (Levaquin) 750 mg in dextrose 5%  mL 750 mg 100 mL/hr             Pt will have a cardiac catheterization performed today and we will review the results afterwards. We will continue to monitor and modify the plan based on the results of the cardiac catheterization. The most recent labs read a glucose of 102, sodium of 137, WBC of 12, RBC of 4.07, hemoglobin of 12, and hematocrit of 37.6.  Plan discussed with interdisciplinary team, continue current and  repeat labs in the AM.  Still with some systolic hypertension and medication adjustments after heart catheterization.  Echocardiogram without wall motion abnormalities.     Discharge planning discussed with patient and care team. Therapy evaluations ordered. Patient aware and agreeable to current plan, continue plan as above.      I spent a total of 50 minutes on the date of the service which included preparing to see the patient, face-to-face patient care, completing clinical documentation, obtaining and/or reviewing separately obtained history, performing a medically appropriate examination, counseling and educating the patient/family/caregiver, ordering medications, tests, or procedures, communicating with other HCPs (not separately reported), independently interpreting results (not separately reported), communicating results to the patient/family/caregiver, and care coordination (not separately reported).         Patient fully evaluated  12/11  for    Problem List Items Addressed This Visit                  Cardiac and Vasculature     Acute myocardial infarct greater than 3 months ago     Relevant Orders     Referral to [facility]     S/P CABG x 4     Relevant Orders     Referral to [facility]     Paroxysmal atrial fibrillation (Multi)     Relevant Medications     metoprolol tartrate (Lopressor) injection 5 mg     metoprolol succinate XL (Toprol-XL) 24 hr tablet 100 mg     Diastolic HF (heart failure)     Relevant Medications     metoprolol tartrate (Lopressor) injection 5 mg     metoprolol succinate XL (Toprol-XL) 24 hr tablet 100 mg     Other Relevant Orders     Referral to [facility]     * (Principal) Atrial fibrillation with RVR (Multi) - Primary     Relevant Medications     metoprolol tartrate (Lopressor) injection 5 mg     metoprolol succinate XL (Toprol-XL) 24 hr tablet 100 mg     Other Relevant Orders     Transthoracic Echo (TTE) Complete (Completed)     Referral to  [facility]     Elevated troponin     Relevant Orders     Case Request Cath Lab: Left Heart Cath, With LV (Completed)     Cardiac Catheterization Procedure (Completed)      Other Visit Diagnoses         Pneumonia due to infectious organism, unspecified laterality, unspecified part of lung         Shortness of breath         Relevant Orders     Transthoracic Echo (TTE) Complete (Completed)     Abnormal electrocardiogram (ECG) (EKG)         Non-ST elevation (NSTEMI) myocardial infarction (Multi)         Relevant Medications     metoprolol tartrate (Lopressor) injection 5 mg (Completed)     digoxin (Lanoxin) injection 250 mcg (Completed)     metoprolol tartrate (Lopressor) injection 5 mg     metoprolol succinate XL (Toprol-XL) 24 hr tablet 100 mg     clopidogrel (Plavix) tablet 600 mg (Completed)     Other Relevant Orders     Referral to [facility]             Patient seen resting in bed with head of bed elevated, no s/s or c/o acute difficulties at this time.  Vital signs for last 24 hours Temp:  [36.2 °C (97.2 °F)-37.1 °C (98.8 °F)] 36.2 °C (97.2 °F)  Heart Rate:  [67-75] 71  BP: (116-147)/(63-90) 116/90    No intake/output data recorded.  Patient still requiring frequent cardiac and SPO2 monitoring. Continue aggressive pulmonary hygiene and oral hygiene. Off loading as tolerated for skin integrity. Medications and labs reviewed-         Results for orders placed or performed during the hospital encounter of 12/08/24 (from the past 24 hours)   Comprehensive Metabolic Panel   Result Value Ref Range     Glucose 101 (H) 74 - 99 mg/dL     Sodium 137 136 - 145 mmol/L     Potassium 5.2 3.5 - 5.3 mmol/L     Chloride 103 98 - 107 mmol/L     Bicarbonate 30 21 - 32 mmol/L     Anion Gap 9 (L) 10 - 20 mmol/L     Urea Nitrogen 16 6 - 23 mg/dL     Creatinine 0.85 0.50 - 1.05 mg/dL     eGFR 70 >60 mL/min/1.73m*2     Calcium 9.8 8.6 - 10.3 mg/dL     Albumin 3.2 (L) 3.4 - 5.0 g/dL     Alkaline Phosphatase 60  33 - 136 U/L     Total Protein 6.3 (L) 6.4 - 8.2 g/dL     AST 14 9 - 39 U/L     Bilirubin, Total 0.4 0.0 - 1.2 mg/dL     ALT 8 7 - 45 U/L   CBC   Result Value Ref Range     WBC 9.3 4.4 - 11.3 x10*3/uL     nRBC 0.0 0.0 - 0.0 /100 WBCs     RBC 4.01 4.00 - 5.20 x10*6/uL     Hemoglobin 12.1 12.0 - 16.0 g/dL     Hematocrit 37.9 36.0 - 46.0 %     MCV 95 80 - 100 fL     MCH 30.2 26.0 - 34.0 pg     MCHC 31.9 (L) 32.0 - 36.0 g/dL     RDW 13.2 11.5 - 14.5 %     Platelets 345 150 - 450 x10*3/uL      Patient recently received an antibiotic (last 12 hours)         None             Pt awake, conversant, and A&O x 3. Pt still has a cough but is doing better. Will order a nocturnal pulse ox for sleep apnea and Tessalon Perles for her cough. Pt's most recent labs read a glucose of 101, sodium of 137, WBC of 9.3, hemoglobin of 12.1, and hematocrit of 37.9. Anticipate discharge tomorrow and will transition pt to PO antibiotics tonight. Will continue to monitor. Plan discussed with interdisciplinary team, continue current and repeat labs in the AM.      Discharge planning discussed with patient and care team. Therapy evaluations ordered.  Patient aware and agreeable to current plan, continue plan as above.      I spent a total of 50 minutes on the date of the service which included preparing to see the patient, face-to-face patient care, completing clinical documentation, obtaining and/or reviewing separately obtained history, performing a medically appropriate examination, counseling and educating the patient/family/caregiver, ordering medications, tests, or procedures, communicating with other HCPs (not separately reported), independently interpreting results (not separately reported), communicating results to the patient/family/caregiver, and care coordination (not separately reported).                 Revision History       Pertinent Physical Exam At Time of Discharge  Physical Exam  Patient fully evaluated 12/12  for   Assessment  & Plan  Atrial fibrillation with RVR (Multi)    Elevated troponin    Patient with significant clinical improvement noted, patient medically cleared for discharge today. Patient seen resting in bed with head of bed elevated, no s/s or c/o acute difficulties at this time.   Vital signs for last 24 hours:  Temp:  [36 °C (96.8 °F)-36.9 °C (98.4 °F)] 36.6 °C (97.9 °F)  Heart Rate:  [62-74] 62  Resp:  [18-20] 20  BP: (127-155)/(60-73) 132/73   Medications and labs reviewed-   Results for orders placed or performed during the hospital encounter of 12/08/24 (from the past 24 hours)   CBC   Result Value Ref Range    WBC 11.6 (H) 4.4 - 11.3 x10*3/uL    nRBC 0.0 0.0 - 0.0 /100 WBCs    RBC 4.05 4.00 - 5.20 x10*6/uL    Hemoglobin 12.1 12.0 - 16.0 g/dL    Hematocrit 37.9 36.0 - 46.0 %    MCV 94 80 - 100 fL    MCH 29.9 26.0 - 34.0 pg    MCHC 31.9 (L) 32.0 - 36.0 g/dL    RDW 13.0 11.5 - 14.5 %    Platelets 338 150 - 450 x10*3/uL   Comprehensive Metabolic Panel   Result Value Ref Range    Glucose 95 74 - 99 mg/dL    Sodium 137 136 - 145 mmol/L    Potassium 4.9 3.5 - 5.3 mmol/L    Chloride 102 98 - 107 mmol/L    Bicarbonate 30 21 - 32 mmol/L    Anion Gap 10 10 - 20 mmol/L    Urea Nitrogen 15 6 - 23 mg/dL    Creatinine 0.86 0.50 - 1.05 mg/dL    eGFR 69 >60 mL/min/1.73m*2    Calcium 9.5 8.6 - 10.3 mg/dL    Albumin 3.2 (L) 3.4 - 5.0 g/dL    Alkaline Phosphatase 55 33 - 136 U/L    Total Protein 6.3 (L) 6.4 - 8.2 g/dL    AST 12 9 - 39 U/L    Bilirubin, Total 0.4 0.0 - 1.2 mg/dL    ALT 8 7 - 45 U/L      Patient recently received an antibiotic (last 12 hours)       None           Susceptibility data from last 90 days.  Collected Specimen Info Organism   12/09/24 Fluid from [facility] Normal throat francesca      No acute issues overnight.  No chest pain or shortness of breath.  Patient seen by cardiology today- December 12, 2024  Okay to discharge from cardiac standpoint on medications. Blood pressure 132/73, heart rate 62, stable. Patient anxious  to return home, will continue with current doses, updated prescriptions sent to pharmacy, eliquis coupon, meds to beds.   Follow-up in cardiology clinic in couple of weeks.    Continue aggressive pulmonary hygiene and oral hygiene. Off loading as tolerated for skin integrity.   Plan discussed with interdisciplinary team, ok to discharge, will continue current and repeat labs in the AM if patient still hospitalized. Patient aware and agreeable to current plan, continue plan as above.     I spent 30 minutes on the date of the service which included preparing to see the patient, face-to-face patient care, completing clinical documentation, obtaining and/or reviewing separately obtained history, performing a medically appropriate examination, counseling and educating the patient/family/caregiver, ordering medications, tests, or procedures, communicating with other HCPs (not separately reported), independently interpreting results (not separately reported), communicating results to the patient/family/caregiver, and care coordination (not separately reported  Outpatient Follow-Up  Future Appointments   Date Time Provider Department Center   1/7/2025 10:45 AM Juan Manuel Castro MD [id number] [place]         [facility]

## 2024-12-12 NOTE — CARE PLAN
Problem: Fall/Injury  Goal: Not fall by end of shift  Outcome: Progressing  Goal: Be free from injury by end of the shift  Outcome: Progressing  Goal: Verbalize understanding of personal risk factors for fall in the hospital  Outcome: Progressing  Goal: Verbalize understanding of risk factor reduction measures to prevent injury from fall in the home  Outcome: Progressing  Goal: Use assistive devices by end of the shift  Outcome: Progressing  Goal: Pace activities to prevent fatigue by end of the shift  Outcome: Progressing     Problem: Respiratory  Goal: Clear secretions with interventions this shift  Outcome: Progressing  Goal: Minimize anxiety/maximize coping throughout shift  Outcome: Progressing  Goal: Minimal/no exertional discomfort or dyspnea this shift  Outcome: Progressing  Goal: No signs of respiratory distress (eg. Use of accessory muscles. Peds grunting)  Outcome: Progressing  Goal: Patent airway maintained this shift  Outcome: Progressing  Goal: Tolerate pulmonary toileting this shift  Outcome: Progressing  Goal: Verbalize decreased shortness of breath this shift  Outcome: Progressing  Goal: Wean oxygen to maintain O2 saturation per order/standard this shift  Outcome: Progressing  Goal: Increase self care and/or family involvement in next 24 hours  Outcome: Progressing     Problem: Pain - Adult  Goal: Verbalizes/displays adequate comfort level or baseline comfort level  Outcome: Progressing     Problem: Safety - Adult  Goal: Free from fall injury  Outcome: Progressing     Problem: Discharge Planning  Goal: Discharge to home or other facility with appropriate resources  Outcome: Progressing     Problem: Chronic Conditions and Co-morbidities  Goal: Patient's chronic conditions and co-morbidity symptoms are monitored and maintained or improved  Outcome: Progressing     Problem: Pain  Goal: Takes deep breaths with improved pain control throughout the shift  Outcome: Progressing  Goal: Turns in bed with  improved pain control throughout the shift  Outcome: Progressing  Goal: Walks with improved pain control throughout the shift  Outcome: Progressing  Goal: Performs ADL's with improved pain control throughout shift  Outcome: Progressing  Goal: Participates in PT with improved pain control throughout the shift  Outcome: Progressing  Goal: Free from opioid side effects throughout the shift  Outcome: Progressing  Goal: Free from acute confusion related to pain meds throughout the shift  Outcome: Progressing

## 2024-12-12 NOTE — NURSING NOTE
Met with patient at the bedside. Discharge Planning discussed. AVS updated with follow-ups, education, and medication information. Verified/ entered a PCP and pharmacy. New medications and side effects discussed. Discussed importance of getting a PCP to manage her care. Provided information on DR Janes Joaquin. Discussed new medications at length. Discussed Stroke signs and symptoms to look for. Sent her warfarin to meds to beds. All questions answered.  Updated nurse on this info. AVS printed and hi-lighted. IV and tele removed. Set up and Uber ride for ID home.

## 2024-12-12 NOTE — NURSING NOTE
Met with patient at the bedside. Discharge Planning discussed. AVS updated with follow-ups, education, and medication information. Verified/ entered a PCP and pharmacy. New medications and side effects discussed. Discussed medications at length. Called Dr Helton and got the meds changed to the proper ones. Provided a work excuse. Discussed healthy at home. All questions answered.  Updated nurse on this info. AVS printed and hi-lighted. IV and tele removed. Waiting on a ride from a friend.

## 2024-12-12 NOTE — PROGRESS NOTES
Subjective Data:    No acute issues overnight.  No chest pain or shortness of breath.      Objective Data:  Last Recorded Vitals:  Vitals:    12/12/24 0025 12/12/24 0411 12/12/24 0804 12/12/24 1148   BP: 155/72 153/72 129/67 132/73   BP Location: Left arm Left arm     Patient Position: Lying Lying     Pulse: 72 69 67 62   Resp: 20 20     Temp: 36.9 °C (98.4 °F) 36.9 °C (98.4 °F) 36 °C (96.8 °F) 36.6 °C (97.9 °F)   TempSrc: Temporal Temporal     SpO2: 97% 91% 94% 94%   Weight:       Height:           Last Labs:  CBC - 12/12/2024:  6:39 AM  11.6 12.1 338    37.9      CMP - 12/12/2024:  6:39 AM  9.5 6.3 12 --- 0.4   _ 3.2 8 55      PTT - 12/8/2024: 11:03 AM  1.4   15.8 26     TROPHS   Date/Time Value Ref Range Status   12/08/2024 03:53 PM 8,818 0 - 13 ng/L Final     Comment:     Previous result verified on 12/8/2024 1217 on specimen/case [id number] called with component TRPHS for procedure Troponin, High Sensitivity, 1 Hour with value 61 ng/L.   12/08/2024 02:44 PM 5,529 0 - 13 ng/L Final     Comment:     Previous result verified on 12/8/2024 1217 on specimen/case [id number] called with component TRPHS for procedure Troponin, High Sensitivity, 1 Hour with value 61 ng/L.   12/08/2024 11:25 AM 61 0 - 13 ng/L Final     BNP   Date/Time Value Ref Range Status   12/08/2024 10:24  0 - 99 pg/mL Final     HGBA1C   Date/Time Value Ref Range Status   11/16/2024 11:45 AM 5.7 See comment % Final   05/02/2024 12:08 PM 5.3 see below % Final     LDLCALC   Date/Time Value Ref Range Status   11/26/2024 01:44 PM 68 <=99 mg/dL Final     Comment:                                 Near   Borderline      AGE      Desirable  Optimal    High     High     Very High     0-19 Y     0 - 109     ---    110-129   >/= 130     ----    20-24 Y     0 - 119     ---    120-159   >/= 160     ----      >24 Y     0 -  99   100-129  130-159   160-189     >/=190     10/24/2023 12:01 PM 68 <=99 mg/dL Final     Comment:                                  Near   Borderline      AGE      Desirable  Optimal    High     High     Very High     0-19 Y     0 - 109     ---    110-129   >/= 130     ----    20-24 Y     0 - 119     ---    120-159   >/= 160     ----      >24 Y     0 -  99   100-129  130-159   160-189     >/=190       VLDL   Date/Time Value Ref Range Status   11/26/2024 01:44 PM 19 0 - 40 mg/dL Final   10/24/2023 12:01 PM 22 0 - 40 mg/dL Final   03/17/2023 12:06 PM 15 0 - 40 mg/dL Final   10/21/2022 11:30 AM 20 0 - 40 mg/dL Final   03/02/2022 02:20 PM 24 0 - 40 mg/dL Final      Last I/O:  No intake/output data recorded.    Past Cardiology Tests (Last 3 Years):  EKG:  ECG 12 lead 12/08/2024 (Preliminary)      ECG 12 Lead 11/12/2024      ECG 12 Lead 08/01/2024      ECG 12 Lead 04/16/2024      ECG 12 Lead 01/18/2024      ECG 12 Lead 10/11/2023    Echo:  No results found for this or any previous visit from the past 1095 days.    Ejection Fractions:  EF   Date/Time Value Ref Range Status   12/09/2024 03:21 PM 58 %      Cath:  No results found for this or any previous visit from the past 1095 days.    Stress Test:  No results found for this or any previous visit from the past 1095 days.    Cardiac Imaging:  No results found for this or any previous visit from the past 1095 days.      Inpatient Medications:  Scheduled medications   Medication Dose Route Frequency    ALPRAZolam  0.25 mg oral Nightly    apixaban  5 mg oral q12h    aspirin  81 mg oral Daily    atorvastatin  40 mg oral Nightly    cycloSPORINE  1 drop Both Eyes BID    doxazosin  4 mg oral Nightly    famotidine  40 mg oral Nightly    levoFLOXacin  500 mg oral q24h    levothyroxine  75 mcg oral Once per day on Monday Tuesday Wednesday Thursday Friday    metoprolol succinate XL  100 mg oral BID    perflutren lipid microspheres  0.5-10 mL of dilution intravenous Once in imaging    perflutren protein A microsphere  0.5 mL intravenous Once in imaging    polyethylene glycol  17 g oral Daily    sertraline  50 mg  oral Daily    sulfur hexafluoride microsphr  2 mL intravenous Once in imaging    valsartan  320 mg oral Daily     PRN medications   Medication    acetaminophen    Or    acetaminophen    Or    acetaminophen    benzonatate    metoprolol    ondansetron    oxygen    oxygen    sodium chloride     Continuous Medications   Medication Dose Last Rate       Physical Exam:  GEN: SD SN 80 yo wf sitting 45 degrees in the bed  HEENT: Atraumatic, normocephalic  COR: Reg.  Good access site   LUNGS: Clear  EXT: Warm     Assessment/Plan   1.) Acute location indeterminate NSTEM with peak Troponin to 8000 range with ASHD S/P CABG  2.) Paroxysmal atrail fibrillation  3.)HLD  PLAN:  1.) Await echocardiogram  2.) Cardiac catheterization will discuss with Dr. Elizabeth    December 10, 2024  Patient underwent left heart catheterization and coronary angiogram with me today through right femoral approach.  Her vein grafts to PDA and PLV as well as diagonal branch were patent.  LIMA was atretic likely due to not severe obstructive disease in the LAD.  The flow in the LAD is good.  This is appropriate for medical management.  The patient is atrial fibrillation, will initiate Eliquis 5 mg twice daily from tomorrow night.  Will continue with baby aspirin.  No more need for Plavix as she will be on Eliquis and aspirin and no stent was needed today.  Will continue with statin and blood pressure control on current medications.  Monitoring postprocedure tonight.      December 11, 2024  Remains chest pain-free.  Patient ejection fraction and echocardiogram.  Okay to discharge from cardiac standpoint on current medications with follow-up in cardiology clinic in 2 weeks.    December 12, 2024  Okay to discharge from cardiac standpoint on medications.  Follow-up in cardiology clinic in couple of weeks.      Thank you for the consult. We will sign off. Please contact cardiology consult service with any additional questions.     Peripheral IV 12/08/24 18  G Left;Proximal Forearm (Active)   Site Assessment Clean;Dry;Intact 12/09/24 0900   Dressing Type Transparent 12/09/24 0900   Line Status Capped 12/09/24 0900   Dressing Status Clean;Dry 12/09/24 0900   Number of days: 1       Code Status:  Full Code    I spent 30 minutes in the professional and overall care of this patient.        Lambert Elizabeth MD PhD

## 2024-12-12 NOTE — CARE PLAN
The patient's goals for the shift include safety and comfort     The clinical goals for the shift include Safety and comfort    Over the shift, the patient is making progress toward the following goals.

## 2024-12-17 ENCOUNTER — OFFICE VISIT (OUTPATIENT)
Dept: CARDIOLOGY | Facility: CLINIC | Age: 79
End: 2024-12-17
Payer: MEDICARE

## 2024-12-17 VITALS
HEIGHT: 64 IN | SYSTOLIC BLOOD PRESSURE: 122 MMHG | BODY MASS INDEX: 26.29 KG/M2 | WEIGHT: 154 LBS | HEART RATE: 55 BPM | DIASTOLIC BLOOD PRESSURE: 68 MMHG

## 2024-12-17 DIAGNOSIS — E78.00 PURE HYPERCHOLESTEROLEMIA: ICD-10-CM

## 2024-12-17 DIAGNOSIS — E66.3 OVERWEIGHT (BMI 25.0-29.9): ICD-10-CM

## 2024-12-17 DIAGNOSIS — K21.9 GASTROESOPHAGEAL REFLUX DISEASE WITHOUT ESOPHAGITIS: ICD-10-CM

## 2024-12-17 DIAGNOSIS — I21.4 NSTEMI (NON-ST ELEVATED MYOCARDIAL INFARCTION) (MULTI): ICD-10-CM

## 2024-12-17 DIAGNOSIS — Z95.1 S/P CABG X 4: ICD-10-CM

## 2024-12-17 DIAGNOSIS — I48.0 PAROXYSMAL ATRIAL FIBRILLATION (MULTI): ICD-10-CM

## 2024-12-17 DIAGNOSIS — R94.31 ABNORMAL EKG: Primary | ICD-10-CM

## 2024-12-17 DIAGNOSIS — I25.10 CORONARY ARTERY DISEASE INVOLVING NATIVE CORONARY ARTERY OF NATIVE HEART WITHOUT ANGINA PECTORIS: ICD-10-CM

## 2024-12-17 PROCEDURE — 3074F SYST BP LT 130 MM HG: CPT | Performed by: INTERNAL MEDICINE

## 2024-12-17 PROCEDURE — 3078F DIAST BP <80 MM HG: CPT | Performed by: INTERNAL MEDICINE

## 2024-12-17 PROCEDURE — 1111F DSCHRG MED/CURRENT MED MERGE: CPT | Performed by: INTERNAL MEDICINE

## 2024-12-17 PROCEDURE — 93005 ELECTROCARDIOGRAM TRACING: CPT | Performed by: INTERNAL MEDICINE

## 2024-12-17 PROCEDURE — 99214 OFFICE O/P EST MOD 30 MIN: CPT | Performed by: INTERNAL MEDICINE

## 2024-12-17 PROCEDURE — 93010 ELECTROCARDIOGRAM REPORT: CPT | Performed by: INTERNAL MEDICINE

## 2024-12-17 PROCEDURE — 1160F RVW MEDS BY RX/DR IN RCRD: CPT | Performed by: INTERNAL MEDICINE

## 2024-12-17 PROCEDURE — 1159F MED LIST DOCD IN RCRD: CPT | Performed by: INTERNAL MEDICINE

## 2024-12-17 PROCEDURE — 1036F TOBACCO NON-USER: CPT | Performed by: INTERNAL MEDICINE

## 2024-12-17 NOTE — PROGRESS NOTES
HPI  Review of Systems   All other systems reviewed and are negative.       Physical Exam  Constitutional:       Appearance: Normal appearance.   HENT:      Head: Normocephalic and atraumatic.   Cardiovascular:      Rate and Rhythm: Normal rate and regular rhythm.   Pulmonary:      Effort: Pulmonary effort is normal.      Breath sounds: Normal breath sounds.   Abdominal:      General: Abdomen is flat.   Musculoskeletal:         General: Signs of injury present.   Neurological:      Mental Status: She is alert.          Problem List Items Addressed This Visit       Abnormal EKG - Primary    Relevant Orders    ECG 12 Lead (Completed)    Follow Up In Cardiology    Coronary artery disease    Hyperlipidemia    Overweight (BMI 25.0-29.9)    S/P CABG x 4    Current Assessment & Plan     12/10/24 coronary angiography ([facility]) mid LAD 30-50% with atretic LIMA to LAD, mid RCA  with left to right collaterals,  patent SVG sequental graft to PDA and OLV, [facility]         Paroxysmal atrial fibrillation (Multi)    GERD (gastroesophageal reflux disease)    NSTEMI (non-ST elevated myocardial infarction) (Multi)    Current Assessment & Plan     During acute pneumonia with paroxysmal atrial fibrillation [facility] hospital admission 12/8/24 with troponin elevtion to 8000 likely embolic 12/9/24 echocardiogram LVEF= 55-60%, Tr with mildly elevated RVSP, aortic sclerosis         Same meds  Return 2 months with EKG

## 2024-12-18 PROBLEM — I21.4 NSTEMI (NON-ST ELEVATED MYOCARDIAL INFARCTION) (MULTI): Status: ACTIVE | Noted: 2024-12-18

## 2024-12-18 LAB
ATRIAL RATE: 55 BPM
P AXIS: 54 DEGREES
P OFFSET: 189 MS
P ONSET: 135 MS
PR INTERVAL: 162 MS
Q ONSET: 216 MS
QRS COUNT: 9 BEATS
QRS DURATION: 88 MS
QT INTERVAL: 430 MS
QTC CALCULATION(BAZETT): 411 MS
QTC FREDERICIA: 417 MS
R AXIS: 4 DEGREES
T AXIS: 2 DEGREES
T OFFSET: 431 MS
VENTRICULAR RATE: 55 BPM

## 2024-12-18 NOTE — ASSESSMENT & PLAN NOTE
During acute pneumonia with paroxysmal atrial fibrillation [facility] hospital admission 12/8/24 with troponin elevtion to 8000 likely embolic 12/9/24 echocardiogram LVEF= 55-60%, Tr with mildly elevated RVSP, aortic sclerosis

## 2024-12-18 NOTE — ASSESSMENT & PLAN NOTE
12/10/24 coronary angiography ([facility]) mid LAD 30-50% with atretic LIMA to LAD, mid RCA  with left to right collaterals,  patent SVG sequental graft to PDA and OLV, patent SVG to diagonal branch ([facility])

## 2024-12-20 NOTE — DOCUMENTATION CLARIFICATION NOTE
"    PATIENT:               DOUGLAS MELENDEZ  ACCT #:                  [id number]  MRN:                       [id number]  :                       1945  ADMIT DATE:       2024 10:03 AM  DISCH DATE:        2024 4:02 PM  RESPONDING PROVIDER #:        [id number]          PROVIDER RESPONSE TEXT:    NSTEMI    CDI QUERY TEXT:    Clarification    Instruction:    Based on your assessment of the patient and the clinical information, please provide the requested documentation by clicking on the appropriate radio button and enter any additional information if prompted.    Question: Is there a diagnosis indicative of the patient elevated Troponins and symptoms    When answering this query, please exercise your independent professional judgment. The fact that a question is being asked, does not imply that any particular answer is desired or expected.    The patient's clinical indicators include:  Clinical Information:  79-year-old female with past medical history of diastolic heart failure, CABG x 4 in 2019, HLD, palpitations, GERD presenting with concern for generalized weakness    Clinical Indicators:     Cards PN, \"Acute location indeterminate NSTEM with peak Troponin to 8000 range with ASHD S/P CABG\"  Treatment:     IM PN, \"Elevated troponin........She states the heaviness in her chest is located the center, but denies active chest pain\"    Lab work:  Troponin - [id number] ()     ECG - \"Atrial fibrillation with rapid ventricular response  Marked ST abnormality, possible lateral subendocardial injury\"    12/10 Heart Cath - \". Luminal irregularities throughout left main, Proximal to mid LAD multiple 30 to 50% lesions. Luminal irregularities in the rest of the vessel. No competitive flow in LAD, Luminal irregularities throughout left circumflex, Mid RCA  with left-to-right collaterals, Patent SVG sequential graft to PDA and PLV, Patent SVG to diagonal branch, Atretic LIMA, Right dominant " coronary artery system, LVEDP 7 mmHg without significant gradient across aortic valve.    Treatment: cards consult, lab work, heart cath    Risk Factors: CABG, CHF, HLD  Options provided:  -- NSTEMI  -- Other - I will add my own diagnosis  -- Refer to Clinical Documentation Reviewer    Query created by: Ofelia Westbrook on 12/12/2024 4:03 PM      Electronically signed by:  LYLE PEOPLES MD 12/20/2024 10:36 AM

## 2024-12-30 ENCOUNTER — TELEPHONE (OUTPATIENT)
Dept: CARDIOLOGY | Facility: CLINIC | Age: 79
End: 2024-12-30
Payer: MEDICARE

## 2024-12-30 DIAGNOSIS — Z95.1 S/P CABG X 4: ICD-10-CM

## 2024-12-30 DIAGNOSIS — E78.00 PURE HYPERCHOLESTEROLEMIA: ICD-10-CM

## 2024-12-30 DIAGNOSIS — I48.0 PAROXYSMAL ATRIAL FIBRILLATION (MULTI): ICD-10-CM

## 2024-12-30 DIAGNOSIS — I21.4 NSTEMI (NON-ST ELEVATED MYOCARDIAL INFARCTION) (MULTI): ICD-10-CM

## 2024-12-30 NOTE — TELEPHONE ENCOUNTER
Patient called asking if she can have a referral for cardiac rehab. Reviewed with Dr. Castro. Per Dr. Castro: Ok to refer patient to cardiac rehab. Referral placed. Spoke to patient and made aware.

## 2024-12-31 ENCOUNTER — TELEPHONE (OUTPATIENT)
Dept: CARDIAC REHAB | Facility: HOSPITAL | Age: 79
End: 2024-12-31
Payer: MEDICARE

## 2024-12-31 NOTE — PROGRESS NOTES
PATIENT: Kristin Martines  DATE: 12/31/2024    Called patient regarding scheduling Cardiac Rehab at [facility] [place].   Left voicemail to call our offices to get scheduled upon earliest convenience at [phone].    Raeann Payton EP

## 2025-01-07 ENCOUNTER — APPOINTMENT (OUTPATIENT)
Dept: CARDIOLOGY | Facility: CLINIC | Age: 80
End: 2025-01-07
Payer: MEDICARE

## 2025-01-08 ENCOUNTER — TELEPHONE (OUTPATIENT)
Dept: CARDIOLOGY | Facility: CLINIC | Age: 80
End: 2025-01-08

## 2025-01-08 ENCOUNTER — OFFICE VISIT (OUTPATIENT)
Dept: CARDIOLOGY | Facility: CLINIC | Age: 80
End: 2025-01-08
Payer: MEDICARE

## 2025-01-08 VITALS
HEART RATE: 66 BPM | SYSTOLIC BLOOD PRESSURE: 144 MMHG | DIASTOLIC BLOOD PRESSURE: 80 MMHG | OXYGEN SATURATION: 96 % | WEIGHT: 164 LBS | HEIGHT: 64 IN | BODY MASS INDEX: 28 KG/M2

## 2025-01-08 DIAGNOSIS — N39.0 ACUTE URINARY TRACT INFECTION: ICD-10-CM

## 2025-01-08 DIAGNOSIS — I10 PRIMARY HYPERTENSION: Primary | ICD-10-CM

## 2025-01-08 DIAGNOSIS — I48.0 PAROXYSMAL ATRIAL FIBRILLATION (MULTI): ICD-10-CM

## 2025-01-08 DIAGNOSIS — Z95.1 S/P CABG X 4: ICD-10-CM

## 2025-01-08 DIAGNOSIS — I25.10 CORONARY ARTERY DISEASE INVOLVING NATIVE CORONARY ARTERY OF NATIVE HEART WITHOUT ANGINA PECTORIS: ICD-10-CM

## 2025-01-08 DIAGNOSIS — M48.00 SPINAL STENOSIS, UNSPECIFIED SPINAL REGION: ICD-10-CM

## 2025-01-08 LAB
ATRIAL RATE: 61 BPM
P AXIS: 56 DEGREES
P OFFSET: 193 MS
P ONSET: 134 MS
PR INTERVAL: 170 MS
Q ONSET: 219 MS
QRS COUNT: 10 BEATS
QRS DURATION: 82 MS
QT INTERVAL: 408 MS
QTC CALCULATION(BAZETT): 410 MS
QTC FREDERICIA: 410 MS
R AXIS: 8 DEGREES
T AXIS: 18 DEGREES
T OFFSET: 423 MS
VENTRICULAR RATE: 61 BPM

## 2025-01-08 PROCEDURE — 1036F TOBACCO NON-USER: CPT | Performed by: INTERNAL MEDICINE

## 2025-01-08 PROCEDURE — 99214 OFFICE O/P EST MOD 30 MIN: CPT | Mod: 25 | Performed by: INTERNAL MEDICINE

## 2025-01-08 PROCEDURE — 3079F DIAST BP 80-89 MM HG: CPT | Performed by: INTERNAL MEDICINE

## 2025-01-08 PROCEDURE — 93010 ELECTROCARDIOGRAM REPORT: CPT | Performed by: INTERNAL MEDICINE

## 2025-01-08 PROCEDURE — 1111F DSCHRG MED/CURRENT MED MERGE: CPT | Performed by: INTERNAL MEDICINE

## 2025-01-08 PROCEDURE — 3077F SYST BP >= 140 MM HG: CPT | Performed by: INTERNAL MEDICINE

## 2025-01-08 PROCEDURE — 99214 OFFICE O/P EST MOD 30 MIN: CPT | Performed by: INTERNAL MEDICINE

## 2025-01-08 PROCEDURE — 1160F RVW MEDS BY RX/DR IN RCRD: CPT | Performed by: INTERNAL MEDICINE

## 2025-01-08 PROCEDURE — 1159F MED LIST DOCD IN RCRD: CPT | Performed by: INTERNAL MEDICINE

## 2025-01-08 PROCEDURE — 93005 ELECTROCARDIOGRAM TRACING: CPT | Performed by: INTERNAL MEDICINE

## 2025-01-08 RX ORDER — METOPROLOL SUCCINATE 100 MG/1
100 TABLET, EXTENDED RELEASE ORAL 2 TIMES DAILY
Qty: 180 TABLET | Refills: 3 | Status: SHIPPED | OUTPATIENT
Start: 2025-01-08

## 2025-01-08 NOTE — ASSESSMENT & PLAN NOTE
12/10/24 coronary angiography  ([facility]) mild LAD 45-50% with atretic LIMA to LAD, mid RCA  with left to right collaterals and OLV, patent SVG yo diagonla ([facility])

## 2025-01-08 NOTE — ASSESSMENT & PLAN NOTE
Carina acute pneumonia with paroxysmal atrial fibrillation [facility] hospital admission 12/8/24 with troponin elevation [id number] likely embolic from afib.  Tr with mildly elevated RVSP

## 2025-01-08 NOTE — PROGRESS NOTES
"  Subjective  Kristin Martines  is a 79 y.o. year old female who presents for ASHD unable to make contact with cardiac rehab.  Still tired with strains in her stomach and namrata    Blood pressure 140/90, pulse 66, height 1.626 m (5' 4\"), weight 74.4 kg (164 lb), SpO2 96%.   Amoxicillin-pot clavulanate, Cephalexin, Ciprofloxacin, Codeine, Erythromycin base, Esomeprazole magnesium, Ezetimibe, Oxycodone-acetaminophen, and Sulfamethoxazole-trimethoprim  Past Medical History:   Diagnosis Date    Encounter for screening for malignant neoplasm of vagina     Vaginal Pap smear    Other specified noninflammatory disorders of vulva and perineum     Vulval lesion    Personal history of other medical treatment 11/01/2019    H/O bone density study    Personal history of other medical treatment     H/O mammogram     Past Surgical History:   Procedure Laterality Date    CARDIAC CATHETERIZATION N/A 12/10/2024    Procedure: Left Heart Cath, With LV;  Surgeon: Lambert Elizabeth MD PhD;  Location: [facility] Cardiac Cath Lab;  Service: Cardiovascular;  Laterality: N/A;  Radial    FOOT SURGERY  12/02/2019    Foot Surgery    OTHER SURGICAL HISTORY  08/09/2021    Cardiac catheterization    OTHER SURGICAL HISTORY  04/29/2020    Coronary artery bypass graft    OTHER SURGICAL HISTORY  12/02/2019    Wrist Surgery    OTHER SURGICAL HISTORY  12/02/2019    Biopsy Vulvar     Family History   Problem Relation Name Age of Onset    Hypertension Mother      Heart attack Father      Asthma Other family hx     Other (cardiac disorder) Other family hx     Hypertension Other family hx      @SOC    Current Outpatient Medications   Medication Sig Dispense Refill    acetaminophen (Tylenol) 325 mg tablet Take 1 tablet (325 mg) by mouth every 4 hours if needed for mild pain (1 - 3).      acetaminophen (Tylenol) 325 mg tablet Take 2 tablets (650 mg) by mouth every 4 hours if needed for mild pain (1 - 3). 30 tablet 0    ALPRAZolam (Xanax) 0.25 mg tablet Take 1 tablet " (0.25 mg) by mouth once daily at bedtime.      apixaban (Eliquis) 5 mg tablet Take 1 tablet (5 mg) by mouth every 12 hours. 180 tablet 3    aspirin 81 mg chewable tablet Chew 1 tablet (81 mg) once daily.      benzonatate (Tessalon) 200 mg capsule Take 1 capsule (200 mg) by mouth 3 times a day as needed for cough. Do not crush or chew. 20 capsule 0    cycloSPORINE (Restasis) 0.05 % ophthalmic emulsion Administer 1 drop into both eyes 2 times a day.      doxazosin (Cardura) 2 mg tablet Take 2 tablets (4 mg) by mouth once daily at bedtime.      ergocalciferol (Vitamin D-2) 1.25 MG (38294 UT) capsule Take 1 capsule (50,000 Units) by mouth 1 (one) time per week. Every monday      estradiol (Estrace) 0.01 % (0.1 mg/gram) vaginal cream Insert 0.25 Applicatorfuls (1 g) into the vagina 2 times a week.      evolocumab (Repatha SureClick) 140 mg/mL injection Inject 1 mL (140 mg) under the skin every 14 (fourteen) days. 2 each 3    famotidine (Pepcid) 40 mg tablet Take 1 tablet (40 mg) by mouth once daily at bedtime.      levothyroxine (Synthroid, Levoxyl) 75 mcg tablet Take 1 tablet (75 mcg) by mouth 5 times a week. Exclude weekends      metoprolol succinate XL (Toprol-XL) 100 mg 24 hr tablet Take 1 tablet (100 mg) by mouth 2 times a day. Do not crush or chew. 180 tablet 3    multivitamin (MULTIPLE VITAMINS ORAL) Take 1 tablet by mouth once daily.      sertraline (Zoloft) 50 mg tablet Take 1 tablet (50 mg) by mouth once daily.      sodium chloride (Ocean) 0.65 % nasal spray Administer 1 spray into each nostril 4 times a day as needed for congestion. 44 mL 12     No current facility-administered medications for this visit.        ROS  Review of Systems   All other systems reviewed and are negative.      Physical Exam  Physical Exam  Constitutional:       Appearance: Normal appearance.   HENT:      Head: Normocephalic and atraumatic.   Cardiovascular:      Rate and Rhythm: Normal rate and regular rhythm.   Pulmonary:       Effort: Pulmonary effort is normal.      Breath sounds: Normal breath sounds.   Abdominal:      General: Abdomen is flat.   Musculoskeletal:      Right lower leg: No edema.      Left lower leg: No edema.   Skin:     General: Skin is warm and dry.   Neurological:      General: No focal deficit present.      Mental Status: She is alert and oriented to person, place, and time.   Psychiatric:         Mood and Affect: Mood normal.         Behavior: Behavior normal.          EKG  Encounter Date: 12/17/24   ECG 12 Lead   Result Value    Ventricular Rate 55    Atrial Rate 55    MN Interval 162    QRS Duration 88    QT Interval 430    QTC Calculation(Bazett) 411    P Axis 54    R Axis 4    T Axis 2    QRS Count 9    Q Onset 216    P Onset 135    P Offset 189    T Offset 431    QTC Fredericia 417    Narrative    Sinus bradycardia  Minimal voltage criteria for LVH, may be normal variant ( R in aVL )  Inferior infarct , age undetermined  Abnormal ECG  When compared with ECG of 08-DEC-2024 13:12,  Aberrant conduction is no longer Present  Inferior infarct is now Present  Confirmed by Juan Manuel Castro ([id number]) on 12/18/2024 3:40:54 PM       Problem List Items Addressed This Visit       Coronary artery disease    Relevant Medications    metoprolol succinate XL (Toprol-XL) 100 mg 24 hr tablet    Primary hypertension - Primary    Relevant Orders    ECG 12 Lead    S/P CABG x 4     12/10/24 coronary angiography  ([facility]) mild LAD 45-50% with atretic LIMA to LAD, mid RCA  with left to right collaterals and OLV, patent SVG yo diagonla ([facility])         Paroxysmal atrial fibrillation (Multi)    Relevant Medications    apixaban (Eliquis) 5 mg tablet    metoprolol succinate XL (Toprol-XL) 100 mg 24 hr tablet    Acute urinary tract infection    Relevant Medications    metoprolol succinate XL (Toprol-XL) 100 mg 24 hr tablet         Same meds  Return 3 months with EKG      Juan Manuel Castro MD

## 2025-01-09 DIAGNOSIS — I48.91 ATRIAL FIBRILLATION WITH RVR (MULTI): ICD-10-CM

## 2025-01-21 ENCOUNTER — CLINICAL SUPPORT (OUTPATIENT)
Dept: CARDIAC REHAB | Facility: HOSPITAL | Age: 80
End: 2025-01-21
Payer: MEDICARE

## 2025-01-21 VITALS — BODY MASS INDEX: 28.15 KG/M2 | HEIGHT: 64 IN

## 2025-01-21 DIAGNOSIS — Z95.1 S/P CABG X 4: ICD-10-CM

## 2025-01-21 DIAGNOSIS — I21.4 NSTEMI (NON-ST ELEVATED MYOCARDIAL INFARCTION) (MULTI): ICD-10-CM

## 2025-01-21 DIAGNOSIS — I48.0 PAROXYSMAL ATRIAL FIBRILLATION (MULTI): ICD-10-CM

## 2025-01-21 NOTE — PROGRESS NOTES
Cardiac Rehabilitation Initial Treatment Plan     Name: Kristin Martines  Medical Record Number: [id number]  YOB: 1945  Age: 79 y.o.    Today’s Date: 1/21/2025  Primary Care Physician: Jemal Helton MD  Referring Physician: Juan Manuel Castro MD  Program Location: [facility] CARDIAC REHAB     General  Primary Diagnosis: NSTEMI  1. NSTEMI (non-ST elevated myocardial infarction) (Multi)  Referral to Cardiac Rehab      2. S/P CABG x 4  Referral to Cardiac Rehab      3. Paroxysmal atrial fibrillation (Multi)  Referral to Cardiac Rehab         Onset/Date of Diagnosis: 12/10/2024     CR/CT/VR Sessions # attended  -  Initial Assessment, not yet started program.    AACVPR Risk Stratification:       Falls Risk: Low  Psychosocial Assessment     Pre PH- Q 9: Survey given. Pending completion and returned from pateint.  Post PH Q 9: To be given at discharge    Sent PH-Q 9 to MD if score > 20: Survey not yet completed.    Pt reported/currently experiencing stress: No  Patient uses stress management skills: Yes   History of: no history of anxiety or depression  Currently seeing a mental health provider: No  Social Support: Yes, Whom:   Family and friends  Quality of Life Survey: SF-36   SF-36 Pre Post   Physical  Functioning  Score TBD TBD   Role Limits- Physical  Score TBD TBD   General Health Score TBD TBD   Knowledge Assessment/Survey  Pre Survey Score: pending completion and return from patient  Post Survey Score: To be given at discharge  Learning Assessment:  Learning assessment/barriers: None  Preferred learning method: Auditory, Visual, and Writing handout  Barriers: None  Comments: Readiness to learn: Ready and willing to learn.    Stages of Change:Preparation    Psychosocial Plan    Goal Status: In progress    Psychosocial Goals:   Maintain or improve PHQ-9 Survey score by discharge.  Maintain or improve Post SF-36 Quality of Life Survey by discharge.       Psychosocial Interventions/Education:  To be done  "in Cardiac Rehab.       AMB CR/MS/VR Psychosocial Assessment- Initial Assessment: Program not yet started    Nutrition Assessment:    Hyperlipidemia: Yes     Lipids:   Lab Results   Component Value Date    CHOL 143 11/26/2024    HDL 55.5 11/26/2024    LDLF 43 03/17/2023    TRIG 96 11/26/2024       Current Dietary Guidelines: regular  Barriers to dietary change: no    Diet Habit Survey: [place] Dietary Assessment  Pre: Initial survey given. Pending completion and return from patient.  Post: To be done at discharge.    Diabetes Assessment    Lab Results   Component Value Date    HGBA1C 5.7 (H) 11/16/2024       History of Diabetes: No    Weight Management    Pre  Wt:  TBD     Ht:  64   inches    BMI:  TBD     Waist Cir:  TBD upon completion first session    Post WT: TBD     BMI: TBD     Waist Cir: TBD    Nutrition Plan    Goal Status: Initial Assessment; goals not yet started    Nutrition Goals:   Achieve a score of 80% or greater on Post [place] Dietary Assessment.  Attend at least 50% or more of Education classes provided by completion of program.    Nutrition Interventions/Education:   AHA handout \"What is Angina\"? And \"What is Cardiac Rehabilitation\"?  Lipid profile reviewed with patient during initial phone interview      AMB CR/MS/VR- Nutrition  -  Initial Assessment: Program not yet started     Exercise Assessment  Yes   Mode: walking   Frequency: 3  Duration: 30-40    Exercise Prescription     Exercise Prescription based on: Pre Rehab Duke Activity Status Index (DASI) and Health History:  Dasi Score: 24.2  Met Score: 2.8    Frequency:  3 days/week   Mode: Treadmill, NuStep /NuStep (T), Airdyne, Arm Ergometer, Recumbent Bike   Duration: 30-45 total aerobic minutes   Intensity: RPE 11-15  Target HR:   Rest + 30, to be calculated after 6-12 attended sessions.   MET Level: 2.8   Patient wears supplemental O2: No     Modality Workload METs Duration (minutes)   1 Pre-Exercise/Rest  ---- -- 5:00   2 NuStep  4000 "  44 carias, load 2   2.4 10 :00   3 NuStep  T5  24 carias, load 2 2.4 10 :00   4 Treadmill   Walk 1.5 mph, 0% incline   2.2 10 :00   5 Airdyne    0.4 load, 15-20 RPM   1.8 10 :00   6    Arm Ergometer  16 carias, level 1  2.1 10:00   7     Recumbent Bike  1 load, 40 RPM  1.3 10:00   8     Post-Exercise -- -- 5:00     Resistance Training: No   Home Exercise Prescription given: To be given prior to discharge from program.    Exercise Plan    Goal Status: Initial Assessment; goals not yet started    Exercise Goals:   150 min/week of moderate intensity aerobic exercise  Exercise Frequency 5-7 days a week    Exercise Interventions/Education:   TO INCREASE MET LEVEL BY 5-10% EACH WEEK  TO INCREASE TOTAL EXERCISE DURATION TO 30-45 MINS     [facility] CR/[place]/VR Exercise-  Initial Assessment: Program not yet started    Other Core Components/Risk Factor Assessment:    Medication adherence  Current Medications:   Medication Documentation Review Audit       Reviewed by Juan Manuel Castro MD (Physician) on 25 at 1044      Medication Order Taking? Sig Documenting Provider Last Dose Status   acetaminophen (Tylenol) 325 mg tablet [id number] No Take 1 tablet (325 mg) by mouth every 4 hours if needed for mild pain (1 - 3). Alma Rosa Bingham MD 2024 Active   acetaminophen (Tylenol) 325 mg tablet [id number]  Take 2 tablets (650 mg) by mouth every 4 hours if needed for mild pain (1 - 3). Jemal Helton MD  Active   ALPRAZolam (Xanax) 0.25 mg tablet [id number] No Take 1 tablet (0.25 mg) by mouth once daily at bedtime. Alma Rosa Bingham MD 2024 Evening  24 2359     Discontinued 25 1037   apixaban (Eliquis) 5 mg tablet [id number]  Take 1 tablet (5 mg) by mouth every 12 hours. Juan Manuel Castro MD  Active   aspirin 81 mg chewable tablet [id number] No Chew 1 tablet (81 mg) once daily. Alma Rosa Bingham MD 2024 Morning Active   benzonatate (Tessalon) 200 mg capsule [id number]  Take 1 capsule (200 mg) by mouth  3 times a day as needed for cough. Do not crush or chew. Jemal Helton MD  Active   cycloSPORINE (Restasis) 0.05 % ophthalmic emulsion [id number] No Administer 1 drop into both eyes 2 times a day. Alma Rosa Bingham MD 12/7/2024 Evening Active   doxazosin (Cardura) 2 mg tablet [id number] No Take 2 tablets (4 mg) by mouth once daily at bedtime. Juan Manuel Castro MD 12/7/2024 Evening Active   ergocalciferol (Vitamin D-2) 1.25 MG (10086 UT) capsule [id number] No Take 1 capsule (50,000 Units) by mouth 1 (one) time per week. Every monday Alma Rosa Bingham MD 12/2/2024 Active   estradiol (Estrace) 0.01 % (0.1 mg/gram) vaginal cream [id number] No Insert 0.25 Applicatorfuls (1 g) into the vagina 2 times a week. Historical Provider, MD Not Taking Active   evolocumab (Repatha SureClick) 140 mg/mL injection [id number] No Inject 1 mL (140 mg) under the skin every 14 (fourteen) days. Juan Manuel Castro MD 12/5/2024 Active   famotidine (Pepcid) 40 mg tablet [id number] No Take 1 tablet (40 mg) by mouth once daily at bedtime. Alma Rosa Bingham MD 12/7/2024 Active   levothyroxine (Synthroid, Levoxyl) 75 mcg tablet [id number] No Take 1 tablet (75 mcg) by mouth 5 times a week. Exclude weekends Alma Rosa Bingham MD 12/6/2024 Active     Discontinued 01/08/25 1037   metoprolol succinate XL (Toprol-XL) 100 mg 24 hr tablet [id number]  Take 1 tablet (100 mg) by mouth 2 times a day. Do not crush or chew. Juan Manuel Castro MD  Active   multivitamin (MULTIPLE VITAMINS ORAL) [id number] No Take 1 tablet by mouth once daily. Alma Rosa Bingham MD 12/7/2024 Morning Active   sertraline (Zoloft) 50 mg tablet [id number] No Take 1 tablet (50 mg) by mouth once daily. Alma Rosa Bingham MD 12/7/2024 Morning Active   sodium chloride (Ocean) 0.65 % nasal spray [id number]  Administer 1 spray into each nostril 4 times a day as needed for congestion. Jemal Helton MD  Active                 Allergies: Amoxicillian, Cephalexin, Ciprofloxin,  "Esomeprazole, Magnesium, Ezetimibe, Oxycodone-acetaminophen, Sulfamethoxazole-trimethoprim                Medication compliance: Yes   Uses pill box/organizer: Yes    Carries medication list: Yes     Blood Pressure Management  History of Hypertension: Yes   Medication Changes: No   Resting BP:  There were no vitals taken for this visit.  144/80 on 1/8/2025    Heart Failure Management  Hx of Heart Failure: No    Smoking/Tobacco Assessment  Social History     Tobacco Use   Smoking Status Never   Smokeless Tobacco Never   Other forms of tobacco: No,denies                  Anyone in the home smoke: No. denies    Other Core Component Plan    Goal Status: Initial Assessment; goals not yet started    Other Core Component Goals:   Medication compliance established during initial phone interview.  Pt carries updated medication list with them at all times  Resting BP <130/80    Other Core Component Interventions/Education:   Patient given [facility] educational handout \"How Do I Manage My Medications\"?  Resting BP readings taken pre and post exercise at each session.    [facility] AMB CR/NM/VR Other Core Components- Initial Assessment: Program not yet started.    Individual Patient Goals:    Patient wants to establish exercise routine 3-5 days/week,30-60 min/ day by completion of program.  Goal Status: Initial Assessment,goals not yet started.  Patient wants to build strength and endurance in the program by completion of program.  Goal Status: Initial Assessment,goals not yet started.    Staff Comments:  Patient is scheduled to start class on 1/27/25      Rehab Staff Signature: Aby Santana RN    PHYSICIAN REVIEW AND RESPONSE:  Please check appropriate boxes and sign     __X_ The participant may enroll in the Cardiac Rehabilitation Program with the above individualized treatment plan (ITP)  ____ Defer exercise guidelines and outcomes per department policy and protocol  ____ Make the following changes to the ITP/Exercise " Prescription:

## 2025-01-24 DIAGNOSIS — I21.4 NON-ST ELEVATION MYOCARDIAL INFARCTION (NSTEMI) (MULTI): ICD-10-CM

## 2025-01-27 ENCOUNTER — CLINICAL SUPPORT (OUTPATIENT)
Dept: CARDIAC REHAB | Facility: HOSPITAL | Age: 80
End: 2025-01-27
Payer: MEDICARE

## 2025-01-27 VITALS
OXYGEN SATURATION: 95 % | SYSTOLIC BLOOD PRESSURE: 127 MMHG | WEIGHT: 163.3 LBS | BODY MASS INDEX: 27.88 KG/M2 | DIASTOLIC BLOOD PRESSURE: 61 MMHG | HEIGHT: 64 IN

## 2025-01-27 DIAGNOSIS — I21.4 NON-ST ELEVATION MYOCARDIAL INFARCTION (NSTEMI) (MULTI): ICD-10-CM

## 2025-01-27 PROCEDURE — 93798 PHYS/QHP OP CAR RHAB W/ECG: CPT | Performed by: INTERNAL MEDICINE

## 2025-01-28 ENCOUNTER — TELEPHONE (OUTPATIENT)
Dept: CARDIOLOGY | Facility: CLINIC | Age: 80
End: 2025-01-28
Payer: MEDICARE

## 2025-01-28 DIAGNOSIS — N39.0 ACUTE URINARY TRACT INFECTION: ICD-10-CM

## 2025-01-28 RX ORDER — METOPROLOL SUCCINATE 100 MG/1
50 TABLET, EXTENDED RELEASE ORAL 2 TIMES DAILY
Status: CANCELLED | COMMUNITY
Start: 2025-01-28

## 2025-01-28 NOTE — TELEPHONE ENCOUNTER
Patient called stating she had chest tightness after 7 mins on the treadmill at cardiac rehab yesterday. Dr. Castro was notified by cardiac rehab. /70. Patient states she thinks the increase in her Metoprolol is causing this along with feeling fatigue and shakiness. Reviewed with Dr. Castro. Per Dr. Castro verbal order: Decrease metoprolol succinate to 50mg BID. Spoke to patient and made aware. Med list updated. Instructed patient to call office with any further questions/concerns.

## 2025-01-29 ENCOUNTER — CLINICAL SUPPORT (OUTPATIENT)
Dept: CARDIAC REHAB | Facility: HOSPITAL | Age: 80
End: 2025-01-29
Payer: MEDICARE

## 2025-01-29 DIAGNOSIS — I21.4 NON-ST ELEVATION MYOCARDIAL INFARCTION (NSTEMI) (MULTI): ICD-10-CM

## 2025-01-29 PROCEDURE — 93798 PHYS/QHP OP CAR RHAB W/ECG: CPT | Performed by: INTERNAL MEDICINE

## 2025-01-31 ENCOUNTER — CLINICAL SUPPORT (OUTPATIENT)
Dept: CARDIAC REHAB | Facility: HOSPITAL | Age: 80
End: 2025-01-31
Payer: MEDICARE

## 2025-01-31 DIAGNOSIS — I21.4 NON-ST ELEVATION MYOCARDIAL INFARCTION (NSTEMI) (MULTI): ICD-10-CM

## 2025-01-31 PROCEDURE — 93798 PHYS/QHP OP CAR RHAB W/ECG: CPT | Performed by: INTERNAL MEDICINE

## 2025-02-03 ENCOUNTER — CLINICAL SUPPORT (OUTPATIENT)
Dept: CARDIAC REHAB | Facility: HOSPITAL | Age: 80
End: 2025-02-03
Payer: MEDICARE

## 2025-02-03 DIAGNOSIS — I21.4 NON-ST ELEVATION MYOCARDIAL INFARCTION (NSTEMI) (MULTI): ICD-10-CM

## 2025-02-03 PROCEDURE — 93798 PHYS/QHP OP CAR RHAB W/ECG: CPT | Performed by: INTERNAL MEDICINE

## 2025-02-05 ENCOUNTER — CLINICAL SUPPORT (OUTPATIENT)
Dept: CARDIAC REHAB | Facility: HOSPITAL | Age: 80
End: 2025-02-05
Payer: MEDICARE

## 2025-02-05 ENCOUNTER — OFFICE VISIT (OUTPATIENT)
Dept: CARDIOLOGY | Facility: CLINIC | Age: 80
End: 2025-02-05
Payer: MEDICARE

## 2025-02-05 VITALS
BODY MASS INDEX: 27.31 KG/M2 | SYSTOLIC BLOOD PRESSURE: 120 MMHG | OXYGEN SATURATION: 96 % | WEIGHT: 160 LBS | HEART RATE: 72 BPM | HEIGHT: 64 IN | DIASTOLIC BLOOD PRESSURE: 80 MMHG

## 2025-02-05 DIAGNOSIS — I25.10 CORONARY ARTERY DISEASE INVOLVING NATIVE CORONARY ARTERY OF NATIVE HEART WITHOUT ANGINA PECTORIS: Primary | ICD-10-CM

## 2025-02-05 DIAGNOSIS — I48.0 PAROXYSMAL ATRIAL FIBRILLATION (MULTI): ICD-10-CM

## 2025-02-05 DIAGNOSIS — E78.00 PURE HYPERCHOLESTEROLEMIA: ICD-10-CM

## 2025-02-05 DIAGNOSIS — Z95.1 S/P CABG X 4: ICD-10-CM

## 2025-02-05 DIAGNOSIS — I21.4 NON-ST ELEVATION MYOCARDIAL INFARCTION (NSTEMI) (MULTI): ICD-10-CM

## 2025-02-05 PROCEDURE — 1036F TOBACCO NON-USER: CPT | Performed by: INTERNAL MEDICINE

## 2025-02-05 PROCEDURE — 1159F MED LIST DOCD IN RCRD: CPT | Performed by: INTERNAL MEDICINE

## 2025-02-05 PROCEDURE — 3074F SYST BP LT 130 MM HG: CPT | Performed by: INTERNAL MEDICINE

## 2025-02-05 PROCEDURE — 99214 OFFICE O/P EST MOD 30 MIN: CPT | Performed by: INTERNAL MEDICINE

## 2025-02-05 PROCEDURE — 93798 PHYS/QHP OP CAR RHAB W/ECG: CPT | Performed by: INTERNAL MEDICINE

## 2025-02-05 PROCEDURE — 3079F DIAST BP 80-89 MM HG: CPT | Performed by: INTERNAL MEDICINE

## 2025-02-05 PROCEDURE — 1160F RVW MEDS BY RX/DR IN RCRD: CPT | Performed by: INTERNAL MEDICINE

## 2025-02-05 RX ORDER — HYDROXYZINE PAMOATE 25 MG/1
1 CAPSULE ORAL EVERY 6 HOURS
COMMUNITY
Start: 2025-01-14

## 2025-02-05 NOTE — PROGRESS NOTES
"  Subjective  Kristin Martines  is a 79 y.o. year old female who presents for ASHD.  Her BP incresaed to 200 improved with increased Metoprolol back to 100 mg suni daily.  No chest pain, no palpitations. no dyspnea, no edema.      Blood pressure 120/80, pulse 72, height 1.626 m (5' 4\"), weight 72.6 kg (160 lb), SpO2 96%.   Amoxicillin-pot clavulanate, Cephalexin, Ciprofloxacin, Codeine, Erythromycin base, Esomeprazole magnesium, Ezetimibe, Oxycodone-acetaminophen, and Sulfamethoxazole-trimethoprim  Past Medical History:   Diagnosis Date    Encounter for screening for malignant neoplasm of vagina     Vaginal Pap smear    Other specified noninflammatory disorders of vulva and perineum     Vulval lesion    Personal history of other medical treatment 11/01/2019    H/O bone density study    Personal history of other medical treatment     H/O mammogram     Past Surgical History:   Procedure Laterality Date    CARDIAC CATHETERIZATION N/A 12/10/2024    Procedure: Left Heart Cath, With LV;  Surgeon: Lambert Elizabeth MD PhD;  Location: [facility] Cardiac Cath Lab;  Service: Cardiovascular;  Laterality: N/A;  Radial    FOOT SURGERY  12/02/2019    Foot Surgery    OTHER SURGICAL HISTORY  08/09/2021    Cardiac catheterization    OTHER SURGICAL HISTORY  04/29/2020    Coronary artery bypass graft    OTHER SURGICAL HISTORY  12/02/2019    Wrist Surgery    OTHER SURGICAL HISTORY  12/02/2019    Biopsy Vulvar     Family History   Problem Relation Name Age of Onset    Hypertension Mother      Heart attack Father      Asthma Other family hx     Other (cardiac disorder) Other family hx     Hypertension Other family hx      @SOC    Current Outpatient Medications   Medication Sig Dispense Refill    hydrOXYzine pamoate (Vistaril) 25 mg capsule Take 1 tablet by mouth every 6 hours.      acetaminophen (Tylenol) 325 mg tablet Take 1 tablet (325 mg) by mouth every 4 hours if needed for mild pain (1 - 3).      acetaminophen (Tylenol) 325 mg tablet " Take 2 tablets (650 mg) by mouth every 4 hours if needed for mild pain (1 - 3). 30 tablet 0    ALPRAZolam (Xanax) 0.25 mg tablet Take 1 tablet (0.25 mg) by mouth once daily at bedtime.      apixaban (Eliquis) 5 mg tablet Take 1 tablet (5 mg) by mouth every 12 hours. 180 tablet 3    aspirin 81 mg chewable tablet Chew 1 tablet (81 mg) once daily.      benzonatate (Tessalon) 200 mg capsule Take 1 capsule (200 mg) by mouth 3 times a day as needed for cough. Do not crush or chew. 20 capsule 0    cycloSPORINE (Restasis) 0.05 % ophthalmic emulsion Administer 1 drop into both eyes 2 times a day.      doxazosin (Cardura) 2 mg tablet Take 2 tablets (4 mg) by mouth once daily at bedtime.      ergocalciferol (Vitamin D-2) 1.25 MG (25714 UT) capsule Take 1 capsule (50,000 Units) by mouth 1 (one) time per week. Every monday      estradiol (Estrace) 0.01 % (0.1 mg/gram) vaginal cream Insert 0.25 Applicatorfuls (1 g) into the vagina 2 times a week.      evolocumab (Repatha SureClick) 140 mg/mL injection Inject 1 mL (140 mg) under the skin every 14 (fourteen) days. 2 each 3    famotidine (Pepcid) 40 mg tablet Take 1 tablet (40 mg) by mouth once daily at bedtime.      levothyroxine (Synthroid, Levoxyl) 75 mcg tablet Take 1 tablet (75 mcg) by mouth 5 times a week. Exclude weekends      metoprolol succinate XL (Toprol-XL) 100 mg 24 hr tablet Take 1 tablet (100 mg) by mouth 2 times a day. Do not crush or chew. 180 tablet 3    multivitamin (MULTIPLE VITAMINS ORAL) Take 1 tablet by mouth once daily.      sertraline (Zoloft) 50 mg tablet Take 1 tablet (50 mg) by mouth once daily.      sodium chloride (Ocean) 0.65 % nasal spray Administer 1 spray into each nostril 4 times a day as needed for congestion. 44 mL 12     No current facility-administered medications for this visit.        ROS  Review of Systems   All other systems reviewed and are negative.      Physical Exam  Physical Exam  Constitutional:       Appearance: Normal appearance.    HENT:      Head: Normocephalic and atraumatic.   Cardiovascular:      Rate and Rhythm: Normal rate and regular rhythm.   Pulmonary:      Effort: Pulmonary effort is normal.      Breath sounds: Normal breath sounds.   Abdominal:      General: Abdomen is flat.   Musculoskeletal:      Right lower leg: No edema.      Left lower leg: No edema.   Skin:     General: Skin is warm and dry.   Neurological:      General: No focal deficit present.      Mental Status: She is alert and oriented to person, place, and time.   Psychiatric:         Mood and Affect: Mood normal.         Behavior: Behavior normal.          EKG  Encounter Date: 01/08/25   ECG 12 Lead   Result Value    Ventricular Rate 61    Atrial Rate 61    AL Interval 170    QRS Duration 82    QT Interval 408    QTC Calculation(Bazett) 410    P Axis 56    R Axis 8    T Axis 18    QRS Count 10    Q Onset 219    P Onset 134    P Offset 193    T Offset 423    QTC Fredericia 410    Narrative    Normal sinus rhythm  Possible Left atrial enlargement  Minimal voltage criteria for LVH, may be normal variant ( R in aVL )  Possible Inferior infarct (cited on or before 17-DEC-2024)  Abnormal ECG  When compared with ECG of 17-DEC-2024 16:00,  No significant change was found  Confirmed by Juan Manuel Castro ([id number]) on 1/8/2025 1:35:28 PM       Problem List Items Addressed This Visit       Coronary artery disease - Primary    Hyperlipidemia    S/P CABG x 4     12/10/24 coronary angiography ([facility]) mild LAD 45-50% with atretic LIMA to LAD. Mid RCA  with left to right collaterals and OLV, patent SVG to diagonal (Forouzandeh)         Paroxysmal atrial fibrillation (Multi)         Same meds  Return with previously scheduled visit      Juan Manuel Castro MD

## 2025-02-05 NOTE — ASSESSMENT & PLAN NOTE
12/10/24 coronary angiography ([facility]) mild LAD 45-50% with atretic LIMA to LAD. Mid RCA  with left to right collaterals and OLV, patent SVG to diagonal (Clara)

## 2025-02-06 ENCOUNTER — TELEPHONE (OUTPATIENT)
Dept: CARDIOLOGY | Facility: CLINIC | Age: 80
End: 2025-02-06
Payer: MEDICARE

## 2025-02-06 NOTE — LETTER
February 6, 2025     Patient: Kristin Martines   YOB: 1945   Date of Visit: 2/5/2025       To Whom It May Concern:    It is my medical opinion that Kristin Martines may return to work on 2/17/25 with no restrictions. Please excuse patient on Mondays, Wednesdays, and Fridays through 5/1/25 as patient participates in cardiac rehabilitation.    If you have any questions or concerns, please don't hesitate to call.         Sincerely,    MD SHANNON Ford/BING

## 2025-02-06 NOTE — TELEPHONE ENCOUNTER
Patient states she needs letter for work stating ok to return to work but needs Monday, Wednesday, and Fridays off for cardiac rehab. Per Dr. Castro- Ok to write letter.

## 2025-02-07 ENCOUNTER — CLINICAL SUPPORT (OUTPATIENT)
Dept: CARDIAC REHAB | Facility: HOSPITAL | Age: 80
End: 2025-02-07
Payer: MEDICARE

## 2025-02-07 DIAGNOSIS — I21.4 NON-ST ELEVATION MYOCARDIAL INFARCTION (NSTEMI) (MULTI): ICD-10-CM

## 2025-02-07 PROCEDURE — 93798 PHYS/QHP OP CAR RHAB W/ECG: CPT | Performed by: INTERNAL MEDICINE

## 2025-02-10 ENCOUNTER — CLINICAL SUPPORT (OUTPATIENT)
Dept: CARDIAC REHAB | Facility: HOSPITAL | Age: 80
End: 2025-02-10
Payer: MEDICARE

## 2025-02-10 DIAGNOSIS — I21.4 NON-ST ELEVATION MYOCARDIAL INFARCTION (NSTEMI) (MULTI): ICD-10-CM

## 2025-02-10 PROCEDURE — 93798 PHYS/QHP OP CAR RHAB W/ECG: CPT | Performed by: INTERNAL MEDICINE

## 2025-02-12 ENCOUNTER — CLINICAL SUPPORT (OUTPATIENT)
Dept: CARDIAC REHAB | Facility: HOSPITAL | Age: 80
End: 2025-02-12
Payer: MEDICARE

## 2025-02-12 DIAGNOSIS — I21.4 NON-ST ELEVATION MYOCARDIAL INFARCTION (NSTEMI) (MULTI): ICD-10-CM

## 2025-02-12 PROCEDURE — 93798 PHYS/QHP OP CAR RHAB W/ECG: CPT | Performed by: INTERNAL MEDICINE

## 2025-02-14 ENCOUNTER — CLINICAL SUPPORT (OUTPATIENT)
Dept: CARDIAC REHAB | Facility: HOSPITAL | Age: 80
End: 2025-02-14
Payer: MEDICARE

## 2025-02-14 DIAGNOSIS — I21.4 NON-ST ELEVATION MYOCARDIAL INFARCTION (NSTEMI) (MULTI): ICD-10-CM

## 2025-02-14 PROCEDURE — 93798 PHYS/QHP OP CAR RHAB W/ECG: CPT | Performed by: INTERNAL MEDICINE

## 2025-02-17 ENCOUNTER — CLINICAL SUPPORT (OUTPATIENT)
Dept: CARDIAC REHAB | Facility: HOSPITAL | Age: 80
End: 2025-02-17
Payer: MEDICARE

## 2025-02-17 DIAGNOSIS — I21.4 NON-ST ELEVATION MYOCARDIAL INFARCTION (NSTEMI) (MULTI): ICD-10-CM

## 2025-02-17 PROCEDURE — 93798 PHYS/QHP OP CAR RHAB W/ECG: CPT | Performed by: INTERNAL MEDICINE

## 2025-02-18 ENCOUNTER — APPOINTMENT (OUTPATIENT)
Dept: CARDIOLOGY | Facility: CLINIC | Age: 80
End: 2025-02-18
Payer: MEDICARE

## 2025-02-19 ENCOUNTER — CLINICAL SUPPORT (OUTPATIENT)
Dept: CARDIAC REHAB | Facility: HOSPITAL | Age: 80
End: 2025-02-19
Payer: MEDICARE

## 2025-02-19 DIAGNOSIS — I21.4 NON-ST ELEVATION MYOCARDIAL INFARCTION (NSTEMI) (MULTI): ICD-10-CM

## 2025-02-19 PROCEDURE — 93798 PHYS/QHP OP CAR RHAB W/ECG: CPT | Performed by: INTERNAL MEDICINE

## 2025-02-21 ENCOUNTER — CLINICAL SUPPORT (OUTPATIENT)
Dept: CARDIAC REHAB | Facility: HOSPITAL | Age: 80
End: 2025-02-21
Payer: MEDICARE

## 2025-02-21 DIAGNOSIS — I21.4 NON-ST ELEVATION MYOCARDIAL INFARCTION (NSTEMI) (MULTI): ICD-10-CM

## 2025-02-21 PROCEDURE — 93798 PHYS/QHP OP CAR RHAB W/ECG: CPT | Performed by: INTERNAL MEDICINE

## 2025-02-24 ENCOUNTER — CLINICAL SUPPORT (OUTPATIENT)
Dept: CARDIAC REHAB | Facility: HOSPITAL | Age: 80
End: 2025-02-24
Payer: MEDICARE

## 2025-02-24 DIAGNOSIS — I21.4 NON-ST ELEVATION MYOCARDIAL INFARCTION (NSTEMI) (MULTI): ICD-10-CM

## 2025-02-24 PROCEDURE — 93798 PHYS/QHP OP CAR RHAB W/ECG: CPT | Performed by: INTERNAL MEDICINE

## 2025-02-25 NOTE — PROGRESS NOTES
Cardiac Rehabilitation Initial Treatment Plan - 30 Day Reassessment 02/26/2025    Name: Kristin Martines  Medical Record Number: [id number]  YOB: 1945  Age: 79 y.o.    Today’s Date: 2/25/2025  Primary Care Physician: Jemal Helton MD  Referring Physician: Juan Manuel Castro MD  Program Location: [facility] CARDIAC REHAB     General  Primary Diagnosis: NSTEMI  1. Non-ST elevation myocardial infarction (NSTEMI) (Multi)  Follow Up In Cardiac Rehab         Onset/Date of Diagnosis: 12/10/2024     CR/WA/VR Sessions 13  attended  -  consistent attendance     AACVPR Risk Stratification:   Low    Falls Risk: Low  Patient instructed to attach safety clip to waist band while on treadmill the first day of class and at each session.    Psychosocial Assessment     Pre PH- Q 9 Survey Score: 9  Post PH Q 9: To be given at discharge    Sent PH-Q 9 to MD if score > 20: PH Q-9 score less than 20 not sent to MD    Pt reported/currently experiencing stress: No  Patient uses stress management skills: Yes   History of: no history of anxiety or depression  Currently seeing a mental health provider: No  Social Support: Yes, Whom:   Family and friends  Quality of Life Survey: SF-36   SF-36 Pre Post   Physical  Functioning  Score 50.72 TBD   Role Limits- Physical  Score 37.26 TBD   General Health Score 52.93 TBD   Knowledge Assessment/Survey  Pre Survey Score: 11  Post Survey Score: To be given at discharge  Learning Assessment:  Learning assessment/barriers: None  Preferred learning method: Auditory, Visual, and Writing handout  Barriers: None  Comments: Readiness to learn: Ready and willing to learn.    Stages of Change: Action    Psychosocial Plan    Goal Status: In progress    Psychosocial Goals:   Maintain or improve PHQ-9 Survey score by discharge.  Maintain or improve Post SF-36 Quality of Life Survey by discharge.       Psychosocial Interventions/Education:  PH Q-9 score reviewed with patient      [facility] CR/WA/VR  "Psychosocial Assessment- 30 Day Reassessment  PH Q-09 reviewed with pt and the Out patient Counseling resource sheet was provided    Nutrition Assessment:    Hyperlipidemia: Yes     Lipids:   Lab Results   Component Value Date    CHOL 143 11/26/2024    HDL 55.5 11/26/2024    LDLF 43 03/17/2023    TRIG 96 11/26/2024       Current Dietary Guidelines: regular  Barriers to dietary change: no    Diet Habit Survey: [place] Dietary Assessment  Pre Survey Score:  58%  Post Survey Score: To be done at discharge.    Diabetes Assessment    Lab Results   Component Value Date    HGBA1C 5.7 (H) 11/16/2024       History of Diabetes: No    Weight Management    Pre  Wt:  160.6 lb    Ht:  64   inches   Pre BMI:  27.5    Waist Cir: 38.25 in    Post WT: TBD     BMI: TBD      Post Waist Cir: TBD    Nutrition Plan    Goal Status: In Progress    Nutrition Goals:   Achieve a score of 80% or greater on Post [place] Dietary Assessment.  Attend at least 50% or more of Education classes provided by completion of program.    Nutrition Interventions/Education:   AHA handout \"What is Angina\"? And \"What is Cardiac Rehabilitation\"?  Lipid profile reviewed with patient during initial phone interview     [facility] CR/CA/VR- Nutrition  -  30 Day Reassessment 02/26/2025  Goal is 80% or greater. Reviewed with pt and the three lowest sections addressed. Healthy eating tip sheet provided.    Exercise Assessment  Yes   Mode: walking   Frequency: 3  Duration: 30-40    Exercise Prescription     Exercise Prescription based on: Pre Rehab Duke Activity Status Index (DASI) and Health History:  Dasi Score: 24.2  Met Score: 2.8    Frequency:  3 days/week   Mode: Treadmill, NuStep /NuStep (T), Airdyne, Arm Ergometer, Recumbent Bike   Duration: 39 total aerobic minutes   Intensity: RPE 11-15  Target HR:   Rest + 30, to be calculated after 6-12 attended sessions.   MET Level: 2.8   Patient wears supplemental O2: No     Modality Workload METs Duration (minutes)   1 " Pre-Exercise/Rest  ---- -- 5:00   2 NuStep  4000  58 carias, load 3  2.7 13:00   3 NuStep  T5  38 carias, load 3 2.7 13:00   4 Treadmill   Walk 1.8 mph, 0.5% incline   2.5 13 :00   5 Airdyne    0.6 load, 20-25 RPM   2.1 13 :00   6    Arm Ergometer  16 carias, level 1  2.1 13.:00   7     Recumbent Bike  2 load, 40 RPM  1.8 13:00   8     Post-Exercise -- -- 5:00     Resistance Training: No   Home Exercise Prescription given: To be given prior to discharge from program.    Exercise Plan    Goal Status: In Progress    Exercise Goals:   150 min/week of moderate intensity aerobic exercise  Exercise Frequency 5-7 days a week    Exercise Interventions/Education:   Increased exercise duration by 1 minute per modality for a total of 39 minutes.  Increased exercise intensity om TM/NS/ad/ Rec bike based on patient's HR/BP/RPE        [facility] CR/GA/VR Exercise-  30 Day Reassessment 2025  Goal is 40% or Greater    Other Core Components/Risk Factor Assessment:    Medication adherence  Current Medications:   Medication Documentation Review Audit       Reviewed by Juan Manuel Castro MD (Physician) on 25 at 0926      Medication Order Taking? Sig Documenting Provider Last Dose Status   acetaminophen (Tylenol) 325 mg tablet [id number]  Take 1 tablet (325 mg) by mouth every 4 hours if needed for mild pain (1 - 3). Alma Rosa Bingham MD  Active   acetaminophen (Tylenol) 325 mg tablet [id number]  Take 2 tablets (650 mg) by mouth every 4 hours if needed for mild pain (1 - 3). Jemal Helton MD  Active   ALPRAZolam (Xanax) 0.25 mg tablet [id number]  Take 1 tablet (0.25 mg) by mouth once daily at bedtime. Historical Provider, MD   24 2359   apixaban (Eliquis) 5 mg tablet [id number]  Take 1 tablet (5 mg) by mouth every 12 hours. Juan Manuel Castro MD  Active   aspirin 81 mg chewable tablet [id number]  Chew 1 tablet (81 mg) once daily. Historical Provider, MD  Active   benzonatate (Tessalon) 200 mg capsule [id number]  Take 1  capsule (200 mg) by mouth 3 times a day as needed for cough. Do not crush or chew. Jemal Helton MD  Active   cycloSPORINE (Restasis) 0.05 % ophthalmic emulsion [id number]  Administer 1 drop into both eyes 2 times a day. Historical Provider, MD  Active   doxazosin (Cardura) 2 mg tablet [id number]  Take 2 tablets (4 mg) by mouth once daily at bedtime. Juan Manuel Castro MD  Active   ergocalciferol (Vitamin D-2) 1.25 MG (60173 UT) capsule [id number]  Take 1 capsule (50,000 Units) by mouth 1 (one) time per week. Every monday Alma Rosa Bingham MD  Active   estradiol (Estrace) 0.01 % (0.1 mg/gram) vaginal cream [id number]  Insert 0.25 Applicatorfuls (1 g) into the vagina 2 times a week. Historical Provider, MD  Active   evolocumab (Repatha SureClick) 140 mg/mL injection [id number]  Inject 1 mL (140 mg) under the skin every 14 (fourteen) days. Juan Manuel Castro MD  Active   famotidine (Pepcid) 40 mg tablet [id number]  Take 1 tablet (40 mg) by mouth once daily at bedtime. Historical Provider, MD  Active   hydrOXYzine pamoate (Vistaril) 25 mg capsule [id number] Yes Take 1 tablet by mouth every 6 hours. Historical Provider, MD  Active   levothyroxine (Synthroid, Levoxyl) 75 mcg tablet [id number]  Take 1 tablet (75 mcg) by mouth 5 times a week. Exclude weekends Alma Rosa Bingham MD  Active   metoprolol succinate XL (Toprol-XL) 100 mg 24 hr tablet [id number]  Take 1 tablet (100 mg) by mouth 2 times a day. Do not crush or chew. Juan Manuel Castro MD  Active   multivitamin (MULTIPLE VITAMINS ORAL) [id number]  Take 1 tablet by mouth once daily. Historical Provider, MD  Active   sertraline (Zoloft) 50 mg tablet [id number]  Take 1 tablet (50 mg) by mouth once daily. Historical Provider, MD  Active   sodium chloride (Ocean) 0.65 % nasal spray [id number]  Administer 1 spray into each nostril 4 times a day as needed for congestion. Jemal Helton MD  Active                 Allergies: Amoxicillian, Cephalexin, Ciprofloxin, Esomeprazole,  "Magnesium, Ezetimibe, Oxycodone-acetaminophen, Sulfamethoxazole-trimethoprim                Medication compliance: Yes   Uses pill box/organizer: Yes    Carries medication list: Yes     Blood Pressure Management  History of Hypertension: Yes   Medication Changes: No   Resting BP:  144/80 on 1/8/2025 ( See Session reports for all VS documented during class sessions)    Heart Failure Management  Hx of Heart Failure: No    Smoking/Tobacco Assessment  Social History     Tobacco Use   Smoking Status Never   Smokeless Tobacco Never   Other forms of tobacco: No,denies                  Anyone in the home smoke: No. denies    Other Core Component Plan    Goal Status: In Progress    Other Core Component Goals:   Medication compliance established during initial phone interview.  Pt carries updated medication list with them at all times  Resting BP <130/80    Other Core Component Interventions/Education:   Patient given AHA educational handout \"How Do I Manage My Medications\"?  Resting BP readings taken pre and post exercise at each session.  Pt attended education lecture on Cardiac Medications provided by Pharmacist.  Patient attended education on Heart Failure, Anatomy of the Heart      [facility] CR/HI/VR Other Core Components- 30 Day Reassessment 02/26/2025    EDUCATIONAL Classes:   Fiber and Plant based eating(Dietitian) 1/29/25, Anatomy of the Heart 02/05/25, Heart Failure 02/12/25, Cardiac medications(pharmacist) 02/19/25,     Cardiac Related ER Visits: 0                                     Hospital Admits: 9      Individual Patient Goals:    Patient wants to establish exercise routine 3-5 days/week,30-60 min/ day by completion of program.  Goal Status: In Progress  Patient wants to build strength and endurance in the program by completion of program.  Goal Status: In progress    Staff Comments:  Patient has done well in the program so far. She continues to show regular attendance and ability to handle increased workloads. " No issues or complaints to note. Will continue to monitor and progress as she can tolerate.       Rehab Staff Signature: Aby Santana RN    PHYSICIAN REVIEW AND RESPONSE:  Please check appropriate boxes and sign     __X_ The participant may enroll in the Cardiac Rehabilitation Program with the above individualized treatment plan (ITP)  ____ Make the following changes to the ITP/Exercise Prescription:

## 2025-02-26 ENCOUNTER — CLINICAL SUPPORT (OUTPATIENT)
Dept: CARDIAC REHAB | Facility: HOSPITAL | Age: 80
End: 2025-02-26
Payer: MEDICARE

## 2025-02-26 DIAGNOSIS — I21.4 NON-ST ELEVATION MYOCARDIAL INFARCTION (NSTEMI) (MULTI): ICD-10-CM

## 2025-02-26 PROCEDURE — 93798 PHYS/QHP OP CAR RHAB W/ECG: CPT | Performed by: INTERNAL MEDICINE

## 2025-02-28 ENCOUNTER — CLINICAL SUPPORT (OUTPATIENT)
Dept: CARDIAC REHAB | Facility: HOSPITAL | Age: 80
End: 2025-02-28
Payer: MEDICARE

## 2025-02-28 DIAGNOSIS — I21.4 NON-ST ELEVATION MYOCARDIAL INFARCTION (NSTEMI) (MULTI): ICD-10-CM

## 2025-02-28 PROCEDURE — 93798 PHYS/QHP OP CAR RHAB W/ECG: CPT | Performed by: INTERNAL MEDICINE

## 2025-03-03 ENCOUNTER — CLINICAL SUPPORT (OUTPATIENT)
Dept: CARDIAC REHAB | Facility: HOSPITAL | Age: 80
End: 2025-03-03
Payer: MEDICARE

## 2025-03-03 DIAGNOSIS — I21.4 NON-ST ELEVATION MYOCARDIAL INFARCTION (NSTEMI) (MULTI): ICD-10-CM

## 2025-03-03 PROCEDURE — 93798 PHYS/QHP OP CAR RHAB W/ECG: CPT | Performed by: INTERNAL MEDICINE

## 2025-03-05 ENCOUNTER — CLINICAL SUPPORT (OUTPATIENT)
Dept: CARDIAC REHAB | Facility: HOSPITAL | Age: 80
End: 2025-03-05
Payer: MEDICARE

## 2025-03-05 DIAGNOSIS — I21.4 NON-ST ELEVATION MYOCARDIAL INFARCTION (NSTEMI) (MULTI): ICD-10-CM

## 2025-03-05 PROCEDURE — 93798 PHYS/QHP OP CAR RHAB W/ECG: CPT | Performed by: INTERNAL MEDICINE

## 2025-03-07 ENCOUNTER — CLINICAL SUPPORT (OUTPATIENT)
Dept: CARDIAC REHAB | Facility: HOSPITAL | Age: 80
End: 2025-03-07
Payer: MEDICARE

## 2025-03-07 DIAGNOSIS — I21.4 NON-ST ELEVATION MYOCARDIAL INFARCTION (NSTEMI) (MULTI): ICD-10-CM

## 2025-03-07 PROCEDURE — 93798 PHYS/QHP OP CAR RHAB W/ECG: CPT | Performed by: INTERNAL MEDICINE

## 2025-03-12 ENCOUNTER — CLINICAL SUPPORT (OUTPATIENT)
Dept: CARDIAC REHAB | Facility: HOSPITAL | Age: 80
End: 2025-03-12
Payer: MEDICARE

## 2025-03-12 DIAGNOSIS — I21.4 NON-ST ELEVATION MYOCARDIAL INFARCTION (NSTEMI) (MULTI): ICD-10-CM

## 2025-03-12 PROCEDURE — 93798 PHYS/QHP OP CAR RHAB W/ECG: CPT | Performed by: INTERNAL MEDICINE

## 2025-03-14 ENCOUNTER — CLINICAL SUPPORT (OUTPATIENT)
Dept: CARDIAC REHAB | Facility: HOSPITAL | Age: 80
End: 2025-03-14
Payer: MEDICARE

## 2025-03-14 DIAGNOSIS — I21.4 NON-ST ELEVATION MYOCARDIAL INFARCTION (NSTEMI) (MULTI): ICD-10-CM

## 2025-03-14 PROCEDURE — 93798 PHYS/QHP OP CAR RHAB W/ECG: CPT | Performed by: INTERNAL MEDICINE

## 2025-03-19 ENCOUNTER — CLINICAL SUPPORT (OUTPATIENT)
Dept: CARDIAC REHAB | Facility: HOSPITAL | Age: 80
End: 2025-03-19
Payer: MEDICARE

## 2025-03-19 DIAGNOSIS — I21.4 NON-ST ELEVATION MYOCARDIAL INFARCTION (NSTEMI) (MULTI): ICD-10-CM

## 2025-03-19 PROCEDURE — 93798 PHYS/QHP OP CAR RHAB W/ECG: CPT | Performed by: INTERNAL MEDICINE

## 2025-03-21 ENCOUNTER — CLINICAL SUPPORT (OUTPATIENT)
Dept: CARDIAC REHAB | Facility: HOSPITAL | Age: 80
End: 2025-03-21
Payer: MEDICARE

## 2025-03-21 DIAGNOSIS — I21.4 NON-ST ELEVATION MYOCARDIAL INFARCTION (NSTEMI) (MULTI): ICD-10-CM

## 2025-03-21 PROCEDURE — 93798 PHYS/QHP OP CAR RHAB W/ECG: CPT | Performed by: INTERNAL MEDICINE

## 2025-03-24 ENCOUNTER — CLINICAL SUPPORT (OUTPATIENT)
Dept: CARDIAC REHAB | Facility: HOSPITAL | Age: 80
End: 2025-03-24
Payer: MEDICARE

## 2025-03-24 DIAGNOSIS — I21.4 NON-ST ELEVATION MYOCARDIAL INFARCTION (NSTEMI) (MULTI): ICD-10-CM

## 2025-03-24 PROCEDURE — 93798 PHYS/QHP OP CAR RHAB W/ECG: CPT | Performed by: INTERNAL MEDICINE

## 2025-03-26 ENCOUNTER — CLINICAL SUPPORT (OUTPATIENT)
Dept: CARDIAC REHAB | Facility: HOSPITAL | Age: 80
End: 2025-03-26
Payer: MEDICARE

## 2025-03-26 ENCOUNTER — OFFICE VISIT (OUTPATIENT)
Dept: CARDIOLOGY | Facility: CLINIC | Age: 80
End: 2025-03-26
Payer: MEDICARE

## 2025-03-26 VITALS
BODY MASS INDEX: 28.51 KG/M2 | HEIGHT: 64 IN | HEART RATE: 69 BPM | WEIGHT: 167 LBS | OXYGEN SATURATION: 94 % | DIASTOLIC BLOOD PRESSURE: 80 MMHG | SYSTOLIC BLOOD PRESSURE: 144 MMHG

## 2025-03-26 DIAGNOSIS — I21.4 NON-ST ELEVATION MYOCARDIAL INFARCTION (NSTEMI) (MULTI): ICD-10-CM

## 2025-03-26 DIAGNOSIS — I25.10 CORONARY ARTERY DISEASE INVOLVING NATIVE CORONARY ARTERY OF NATIVE HEART WITHOUT ANGINA PECTORIS: Primary | ICD-10-CM

## 2025-03-26 DIAGNOSIS — K21.9 GASTROESOPHAGEAL REFLUX DISEASE WITHOUT ESOPHAGITIS: ICD-10-CM

## 2025-03-26 DIAGNOSIS — Z95.1 S/P CABG X 4: ICD-10-CM

## 2025-03-26 DIAGNOSIS — E78.00 PURE HYPERCHOLESTEROLEMIA: ICD-10-CM

## 2025-03-26 DIAGNOSIS — R42 DIZZINESS: ICD-10-CM

## 2025-03-26 LAB
ANION GAP SERPL CALCULATED.4IONS-SCNC: 9 MMOL/L (CALC) (ref 7–17)
ATRIAL RATE: 61 BPM
BASOPHILS # BLD AUTO: 63 CELLS/UL (ref 0–200)
BASOPHILS NFR BLD AUTO: 0.9 %
BUN SERPL-MCNC: 19 MG/DL (ref 7–25)
BUN/CREAT SERPL: NORMAL (CALC) (ref 6–22)
CALCIUM SERPL-MCNC: 9.5 MG/DL (ref 8.6–10.4)
CHLORIDE SERPL-SCNC: 105 MMOL/L (ref 98–110)
CO2 SERPL-SCNC: 27 MMOL/L (ref 20–32)
CREAT SERPL-MCNC: 0.86 MG/DL (ref 0.6–1)
EGFRCR SERPLBLD CKD-EPI 2021: 69 ML/MIN/1.73M2
EOSINOPHIL # BLD AUTO: 168 CELLS/UL (ref 15–500)
EOSINOPHIL NFR BLD AUTO: 2.4 %
ERYTHROCYTE [DISTWIDTH] IN BLOOD BY AUTOMATED COUNT: 13.1 % (ref 11–15)
GLUCOSE SERPL-MCNC: 87 MG/DL (ref 65–99)
HCT VFR BLD AUTO: 38.6 % (ref 35–45)
HGB BLD-MCNC: 12.7 G/DL (ref 11.7–15.5)
LYMPHOCYTES # BLD AUTO: 1953 CELLS/UL (ref 850–3900)
LYMPHOCYTES NFR BLD AUTO: 27.9 %
MCH RBC QN AUTO: 29.1 PG (ref 27–33)
MCHC RBC AUTO-ENTMCNC: 32.9 G/DL (ref 32–36)
MCV RBC AUTO: 88.3 FL (ref 80–100)
MONOCYTES # BLD AUTO: 735 CELLS/UL (ref 200–950)
MONOCYTES NFR BLD AUTO: 10.5 %
NEUTROPHILS # BLD AUTO: 4081 CELLS/UL (ref 1500–7800)
NEUTROPHILS NFR BLD AUTO: 58.3 %
P AXIS: 41 DEGREES
P OFFSET: 190 MS
P ONSET: 131 MS
PLATELET # BLD AUTO: 309 THOUSAND/UL (ref 140–400)
PMV BLD REES-ECKER: 10.7 FL (ref 7.5–12.5)
POTASSIUM SERPL-SCNC: 4.5 MMOL/L (ref 3.5–5.3)
PR INTERVAL: 172 MS
Q ONSET: 217 MS
QRS COUNT: 10 BEATS
QRS DURATION: 86 MS
QT INTERVAL: 414 MS
QTC CALCULATION(BAZETT): 416 MS
QTC FREDERICIA: 416 MS
R AXIS: 8 DEGREES
RBC # BLD AUTO: 4.37 MILLION/UL (ref 3.8–5.1)
SODIUM SERPL-SCNC: 141 MMOL/L (ref 135–146)
T AXIS: 16 DEGREES
T OFFSET: 424 MS
VENTRICULAR RATE: 61 BPM
WBC # BLD AUTO: 7 THOUSAND/UL (ref 3.8–10.8)

## 2025-03-26 PROCEDURE — 3079F DIAST BP 80-89 MM HG: CPT | Performed by: INTERNAL MEDICINE

## 2025-03-26 PROCEDURE — 93798 PHYS/QHP OP CAR RHAB W/ECG: CPT | Performed by: INTERNAL MEDICINE

## 2025-03-26 PROCEDURE — 99214 OFFICE O/P EST MOD 30 MIN: CPT | Performed by: INTERNAL MEDICINE

## 2025-03-26 PROCEDURE — 93005 ELECTROCARDIOGRAM TRACING: CPT | Performed by: INTERNAL MEDICINE

## 2025-03-26 PROCEDURE — 99214 OFFICE O/P EST MOD 30 MIN: CPT | Mod: 25 | Performed by: INTERNAL MEDICINE

## 2025-03-26 PROCEDURE — 1160F RVW MEDS BY RX/DR IN RCRD: CPT | Performed by: INTERNAL MEDICINE

## 2025-03-26 PROCEDURE — 3077F SYST BP >= 140 MM HG: CPT | Performed by: INTERNAL MEDICINE

## 2025-03-26 PROCEDURE — 93010 ELECTROCARDIOGRAM REPORT: CPT | Performed by: INTERNAL MEDICINE

## 2025-03-26 PROCEDURE — 1159F MED LIST DOCD IN RCRD: CPT | Performed by: INTERNAL MEDICINE

## 2025-03-26 PROCEDURE — 1036F TOBACCO NON-USER: CPT | Performed by: INTERNAL MEDICINE

## 2025-03-26 NOTE — ASSESSMENT & PLAN NOTE
12/10/24 coronary angiography ([facility]) mild LAD 45-50% with atretic LIMA to LAD.  Mid RCA  with left to right collaterals and OLV, patent SVG to diagonal

## 2025-03-26 NOTE — PROGRESS NOTES
"  Subjective  Kristin Martines  is a 79 y.o. year old female who presents for ASHD Follow up.  Slight dizziness at work relieved after eating lunch. Still participating in cardiac rehab.. Concerned that Repatha might cause diabetes    Blood pressure 140/80, pulse 69, height 1.626 m (5' 4\"), weight 75.8 kg (167 lb), SpO2 94%.   Amoxicillin-pot clavulanate, Cephalexin, Ciprofloxacin, Codeine, Erythromycin base, Esomeprazole magnesium, Ezetimibe, Oxycodone-acetaminophen, and Sulfamethoxazole-trimethoprim  Past Medical History:   Diagnosis Date    Encounter for screening for malignant neoplasm of vagina     Vaginal Pap smear    Other specified noninflammatory disorders of vulva and perineum     Vulval lesion    Personal history of other medical treatment 11/01/2019    H/O bone density study    Personal history of other medical treatment     H/O mammogram     Past Surgical History:   Procedure Laterality Date    CARDIAC CATHETERIZATION N/A 12/10/2024    Procedure: Left Heart Cath, With LV;  Surgeon: Lambert Elizabeth MD PhD;  Location: [facility] Cardiac Cath Lab;  Service: Cardiovascular;  Laterality: N/A;  Radial    FOOT SURGERY  12/02/2019    Foot Surgery    OTHER SURGICAL HISTORY  08/09/2021    Cardiac catheterization    OTHER SURGICAL HISTORY  04/29/2020    Coronary artery bypass graft    OTHER SURGICAL HISTORY  12/02/2019    Wrist Surgery    OTHER SURGICAL HISTORY  12/02/2019    Biopsy Vulvar     Family History   Problem Relation Name Age of Onset    Hypertension Mother      Heart attack Father      Asthma Other family hx     Other (cardiac disorder) Other family hx     Hypertension Other family hx      @SOC    Current Outpatient Medications   Medication Sig Dispense Refill    acetaminophen (Tylenol) 325 mg tablet Take 1 tablet (325 mg) by mouth every 4 hours if needed for mild pain (1 - 3).      acetaminophen (Tylenol) 325 mg tablet Take 2 tablets (650 mg) by mouth every 4 hours if needed for mild pain (1 - 3). 30 " tablet 0    ALPRAZolam (Xanax) 0.25 mg tablet Take 1 tablet (0.25 mg) by mouth once daily at bedtime.      apixaban (Eliquis) 5 mg tablet Take 1 tablet (5 mg) by mouth every 12 hours. 180 tablet 3    aspirin 81 mg chewable tablet Chew 1 tablet (81 mg) once daily.      benzonatate (Tessalon) 200 mg capsule Take 1 capsule (200 mg) by mouth 3 times a day as needed for cough. Do not crush or chew. 20 capsule 0    cycloSPORINE (Restasis) 0.05 % ophthalmic emulsion Administer 1 drop into both eyes 2 times a day.      doxazosin (Cardura) 2 mg tablet Take 2 tablets (4 mg) by mouth once daily at bedtime.      ergocalciferol (Vitamin D-2) 1.25 MG (12533 UT) capsule Take 1 capsule (50,000 Units) by mouth 1 (one) time per week. Every monday      estradiol (Estrace) 0.01 % (0.1 mg/gram) vaginal cream Insert 0.25 Applicatorfuls (1 g) into the vagina 2 times a week.      evolocumab (Repatha SureClick) 140 mg/mL injection Inject 1 mL (140 mg) under the skin every 14 (fourteen) days. 2 each 3    famotidine (Pepcid) 40 mg tablet Take 1 tablet (40 mg) by mouth once daily at bedtime.      hydrOXYzine pamoate (Vistaril) 25 mg capsule Take 1 tablet by mouth every 6 hours.      levothyroxine (Synthroid, Levoxyl) 75 mcg tablet Take 1 tablet (75 mcg) by mouth 5 times a week. Exclude weekends      metoprolol succinate XL (Toprol-XL) 100 mg 24 hr tablet Take 1 tablet (100 mg) by mouth 2 times a day. Do not crush or chew. 180 tablet 3    multivitamin (MULTIPLE VITAMINS ORAL) Take 1 tablet by mouth once daily.      sertraline (Zoloft) 50 mg tablet Take 1 tablet (50 mg) by mouth once daily.      sodium chloride (Ocean) 0.65 % nasal spray Administer 1 spray into each nostril 4 times a day as needed for congestion. 44 mL 12     No current facility-administered medications for this visit.        ROS  Review of Systems   All other systems reviewed and are negative.      Physical Exam  Physical Exam  Constitutional:       Appearance: Normal  appearance.   HENT:      Head: Normocephalic and atraumatic.   Cardiovascular:      Rate and Rhythm: Normal rate and regular rhythm.   Pulmonary:      Effort: Pulmonary effort is normal.      Breath sounds: Normal breath sounds.   Abdominal:      General: Abdomen is flat.   Musculoskeletal:      Right lower leg: No edema.      Left lower leg: No edema.   Skin:     General: Skin is warm and dry.   Neurological:      General: No focal deficit present.      Mental Status: She is alert and oriented to person, place, and time.   Psychiatric:         Mood and Affect: Mood normal.         Behavior: Behavior normal.          EKG  Encounter Date: 01/08/25   ECG 12 Lead   Result Value    Ventricular Rate 61    Atrial Rate 61    MS Interval 170    QRS Duration 82    QT Interval 408    QTC Calculation(Bazett) 410    P Axis 56    R Axis 8    T Axis 18    QRS Count 10    Q Onset 219    P Onset 134    P Offset 193    T Offset 423    QTC Fredericia 410    Narrative    Normal sinus rhythm  Possible Left atrial enlargement  Minimal voltage criteria for LVH, may be normal variant ( R in aVL )  Possible Inferior infarct (cited on or before 17-DEC-2024)  Abnormal ECG  When compared with ECG of 17-DEC-2024 16:00,  No significant change was found  Confirmed by Juan Manuel Castro ([id number]) on 1/8/2025 1:35:28 PM       Problem List Items Addressed This Visit       Coronary artery disease - Primary    Relevant Orders    ECG 12 Lead    Hyperlipidemia    S/P CABG x 4     12/10/24 coronary angiography ([facility]) mild LAD 45-50% with atretic LIMA to LAD.  Mid RCA  with left to right collaterals and OLV, patent SVG to diagonal         GERD (gastroesophageal reflux disease)         BMP, CBC  Return 3 months with EKG      Juan Manuel Castro MD

## 2025-03-27 NOTE — PROGRESS NOTES
Cardiac Rehabilitation Initial Treatment Plan - 60 Day Reassessment 02/28/2025    Name: Kristin Martines  Medical Record Number: [id number]  YOB: 1945  Age: 79 y.o.    Today’s Date: 3/27/2025  Primary Care Physician: Jemal Helton MD  Referring Physician: Juan Manuel Castro MD  Program Location: [facility] CARDIAC REHAB     General  Primary Diagnosis: NSTEMI  1. Non-ST elevation myocardial infarction (NSTEMI) (Multi)  Follow Up In Cardiac Rehab         Onset/Date of Diagnosis: 12/10/2024     CR/AL/VR Sessions 24 attended  -  consistent attendance     AACVPR Risk Stratification:   Low    Falls Risk: Low  Patient instructed to attach safety clip to waist band while on treadmill the first day of class and at each session.    Psychosocial Assessment     Pre PH- Q 9 Survey Score: 9  Post PH Q 9: To be given at discharge    Sent PH-Q 9 to MD if score > 20: PH Q-9 score less than 20 not sent to MD    Pt reported/currently experiencing stress: No  Patient uses stress management skills: Yes   History of: no history of anxiety or depression  Currently seeing a mental health provider: No  Social Support: Yes, Whom:   Family and friends  Quality of Life Survey: SF-36   SF-36 Pre Post   Physical  Functioning  Score 50.72 TBD   Role Limits- Physical  Score 37.26 TBD   General Health Score 52.93 TBD   Knowledge Assessment/Survey  Pre Survey Score: 11  Post Survey Score: To be given at discharge  Learning Assessment:  Learning assessment/barriers: None  Preferred learning method: Auditory, Visual, and Writing handout  Barriers: None  Comments: Readiness to learn: Ready and willing to learn.    Stages of Change: Action    Psychosocial Plan    Goal Status: In progress    Psychosocial Goals:   Maintain or improve PHQ-9 Survey score by discharge.  Maintain or improve Post SF-36 Quality of Life Survey by discharge.       Psychosocial Interventions/Education:  PH Q-9 score reviewed with patient  PHQ-9 reviewed with pt and  "the Out patient Counseling resource sheet was provided     AMB CR/NJ/VR Psychosocial Assessment- 60 Day Reassessment      Nutrition Assessment:    Hyperlipidemia: Yes     Lipids:   Lab Results   Component Value Date    CHOL 143 11/26/2024    HDL 55.5 11/26/2024    LDLF 43 03/17/2023    TRIG 96 11/26/2024       Current Dietary Guidelines: regular  Barriers to dietary change: no    Diet Habit Survey: [place] Dietary Assessment  Pre Survey Score:  58%  Post Survey Score: To be done at discharge.    Diabetes Assessment    Lab Results   Component Value Date    HGBA1C 5.7 (H) 11/16/2024       History of Diabetes: No    Weight Management    Pre  Wt:  160.6 lb    Ht:  64   inches   Pre BMI:  27.5    Waist Cir: 38.25 inches    Post WT: TBD     BMI: TBD      Post Waist Cir: TBD    Nutrition Plan    Goal Status: In Progress    Nutrition Goals:   Achieve a score of 80% or greater on Post [place] Dietary Assessment.  Attend at least 50% or more of Education classes provided by completion of program.    Nutrition Interventions/Education:   AHA handout \"What is Angina\"? And \"What is Cardiac Rehabilitation\"?  Lipid profile reviewed with patient during initial phone interview  Reviewed nutrition assessment with pt and the three lowest sections addressed. Healthy eating tip sheet provided.      AMB CR/NJ/VR- Nutrition  -  60 Day Reassessment 03/28/2025      Exercise Assessment  Pt does some exercise at home.  Mode: walking   Frequency: 3x a week  Duration: 30-40 minutes    Exercise Prescription     Exercise Prescription based on: Pre Rehab Duke Activity Status Index (DASI) and Health History:  Dasi Score: 24.2  Met Score: 2.8    Frequency:  3 days/week   Mode: Treadmill, NuStep /NuStep (T), Airdyne, Arm Ergometer, Recumbent Bike   Duration: 45 total aerobic minutes   Intensity: RPE 11-15  Target HR:   beats per minute  MET Level: 2.8   Patient wears supplemental O2: No     Modality Workload METs Duration (minutes)   1 " Pre-Exercise/Rest  ---- -- 5:00   2 NuStep  4000  58 carias, load 3  2.7 15:00   3 NuStep  T5  38 carias, load 3 2.7 15:00   4 Treadmill   Walk 1.9 mph, 1.0% incline   2.5 15:00   5 Airdyne    0.6 load, 20-25 RPM   2.1 15:00   6    Arm Ergometer  16 carias, level 1  2.1 15.:00   7     Recumbent Bike  2 load, 50 RPM  2.0 15:00   8     Post-Exercise -- -- 5:00     Resistance Training: No   Home Exercise Prescription given: 25    Exercise Plan    Goal Status: In Progress    Exercise Goals:   150 min/week of moderate intensity aerobic exercise by completion of program.  Exercise Frequency 5-7 days a week by completion of program.  To increase exercise met level by 40% by the completion of the program.    Exercise Interventions/Education:   Increased exercise duration by 1 minute per modality for a total of 45 minutes.  Increased exercise intensity om TM/Rec bike based on patient's HR/BP/RPE .  Benefits of Exercise Attended education on Benefits of Exercise.     [facility] CR/LA/VR Exercise-  60 Day Reassessment 2025      Other Core Components/Risk Factor Assessment:    Medication adherence  Current Medications:   Medication Documentation Review Audit       Reviewed by Juan Manuel Castro MD (Physician) on 25 at 1120      Medication Order Taking? Sig Documenting Provider Last Dose Status   acetaminophen (Tylenol) 325 mg tablet [id number] No Take 1 tablet (325 mg) by mouth every 4 hours if needed for mild pain (1 - 3). Historical MD Otilia 2024 Active   acetaminophen (Tylenol) 325 mg tablet [id number]  Take 2 tablets (650 mg) by mouth every 4 hours if needed for mild pain (1 - 3). Jemal Helton MD  Active   ALPRAZolam (Xanax) 0.25 mg tablet [id number] No Take 1 tablet (0.25 mg) by mouth once daily at bedtime. Alma Rosa Bingham MD 2024 Evening  24 1523   apixaban (Eliquis) 5 mg tablet [id number]  Take 1 tablet (5 mg) by mouth every 12 hours. Juan Manuel Castro MD  Active   aspirin 81 mg  chewable tablet [id number] No Chew 1 tablet (81 mg) once daily. Alma Rosa Bingham MD 12/7/2024 Morning Active   benzonatate (Tessalon) 200 mg capsule [id number]  Take 1 capsule (200 mg) by mouth 3 times a day as needed for cough. Do not crush or chew. Jemal Helton MD  Active   cycloSPORINE (Restasis) 0.05 % ophthalmic emulsion [id number] No Administer 1 drop into both eyes 2 times a day. Alma Rosa Bingham MD 12/7/2024 Evening Active   doxazosin (Cardura) 2 mg tablet [id number] No Take 2 tablets (4 mg) by mouth once daily at bedtime. Juan Manuel Castro MD 12/7/2024 Evening Active   ergocalciferol (Vitamin D-2) 1.25 MG (04504 UT) capsule [id number] No Take 1 capsule (50,000 Units) by mouth 1 (one) time per week. Every monday Alma Rosa Bingham MD 12/2/2024 Active   estradiol (Estrace) 0.01 % (0.1 mg/gram) vaginal cream [id number] No Insert 0.25 Applicatorfuls (1 g) into the vagina 2 times a week. Alma Rosa Bingham MD Not Taking Active   evolocumab (Repatha SureClick) 140 mg/mL injection [id number] No Inject 1 mL (140 mg) under the skin every 14 (fourteen) days. Juan Manuel Castro MD 12/5/2024 Active   famotidine (Pepcid) 40 mg tablet [id number] No Take 1 tablet (40 mg) by mouth once daily at bedtime. Alma Rosa Bingham MD 12/7/2024 Active   hydrOXYzine pamoate (Vistaril) 25 mg capsule [id number]  Take 1 tablet by mouth every 6 hours. Alma Rosa Bingham MD  Active   levothyroxine (Synthroid, Levoxyl) 75 mcg tablet [id number] No Take 1 tablet (75 mcg) by mouth 5 times a week. Exclude weekends Alma Rosa Bingham MD 12/6/2024 Active   metoprolol succinate XL (Toprol-XL) 100 mg 24 hr tablet [id number]  Take 1 tablet (100 mg) by mouth 2 times a day. Do not crush or chew. Juan Manuel Castro MD  Active   multivitamin (MULTIPLE VITAMINS ORAL) [id number] No Take 1 tablet by mouth once daily. Historical Provider, MD 12/7/2024 Morning Active   sertraline (Zoloft) 50 mg tablet [id number] No Take 1 tablet (50 mg) by mouth once  "daily. Historical Provider, MD 12/7/2024 Morning Active   sodium chloride (Ocean) 0.65 % nasal spray [id number]  Administer 1 spray into each nostril 4 times a day as needed for congestion. Jemal Helton MD  Active                 Allergies: Amoxicillian, Cephalexin, Ciprofloxin, Esomeprazole, Magnesium, Ezetimibe, Oxycodone-acetaminophen, Sulfamethoxazole-trimethoprim                Medication compliance: Yes   Uses pill box/organizer: Yes    Carries medication list: Yes     Blood Pressure Management  History of Hypertension: Yes   Medication Changes: No   Resting BP:  144/80 on 1/8/2025 ( See Session reports for all VS documented during class sessions)    Heart Failure Management  Hx of Heart Failure: No    Smoking/Tobacco Assessment  Social History     Tobacco Use   Smoking Status Never   Smokeless Tobacco Never   Other forms of tobacco: No,denies                  Anyone in the home smoke: No. denies    Other Core Component Plan    Goal Status: In Progress    Other Core Component Goals:   Medication compliance established during initial phone interview.  Pt carries updated medication list with them at all times  Resting BP <130/80    Other Core Component Interventions/Education:   Patient given AHA educational handout \"How Do I Manage My Medications\"?  Resting BP readings taken pre and post exercise at each session.  Pt attended education lecture on Cardiac Medications provided by Pharmacist.  Patient attended education on Heart Failure, Anatomy of the Heart, Coronary Stents, Stress Testing and Physiology of Aging.    [facility] AMB CR/ND/VR Other Core Components- 60 Day Reassessment 03/26/2025    EDUCATIONAL Classes:   Fiber and Plant based eating(Dietitian) 1/29/25, Anatomy of the Heart 02/05/25, Heart Failure 02/12/25, Cardiac Medications (Pharmacist) 02/19/25, Benefits of Exercise 2/26/25, Don't be \"Salty\" (Dietitian) 3/5/25, Coronary Stents 3/12/25, Stress Testing 3/19/25, Physiology of aging 3/26/25    Cardiac " Related ER Visits: 0                                     Hospital Admits: 0      Individual Patient Goals:    Patient wants to establish exercise routine 3-5 days/week,30-60 min/ day by completion of program.  Goal Status: In Progress  Patient wants to build strength and endurance in the program by completion of program.  Goal Status: In progress    Staff Comments:  Patient has done well in the program so far. She continues to show regular attendance and ability to handle increased workloads. No issues or complaints to note. Will continue to monitor and progress as she can tolerate.       Rehab Staff Signature: SHEREE STOREY    PHYSICIAN REVIEW AND RESPONSE:  Please check appropriate boxes and sign     __X_ The participant may enroll in the Cardiac Rehabilitation Program with the above individualized treatment plan (ITP)  ____ Make the following changes to the ITP/Exercise Prescription:

## 2025-03-28 ENCOUNTER — CLINICAL SUPPORT (OUTPATIENT)
Dept: CARDIAC REHAB | Facility: HOSPITAL | Age: 80
End: 2025-03-28
Payer: MEDICARE

## 2025-03-28 DIAGNOSIS — I21.4 NON-ST ELEVATION MYOCARDIAL INFARCTION (NSTEMI) (MULTI): ICD-10-CM

## 2025-03-28 PROCEDURE — 93798 PHYS/QHP OP CAR RHAB W/ECG: CPT | Performed by: INTERNAL MEDICINE

## 2025-03-31 ENCOUNTER — APPOINTMENT (OUTPATIENT)
Dept: CARDIOLOGY | Facility: CLINIC | Age: 80
End: 2025-03-31
Payer: MEDICARE

## 2025-03-31 ENCOUNTER — CLINICAL SUPPORT (OUTPATIENT)
Dept: CARDIAC REHAB | Facility: HOSPITAL | Age: 80
End: 2025-03-31
Payer: MEDICARE

## 2025-03-31 DIAGNOSIS — I21.4 NON-ST ELEVATION MYOCARDIAL INFARCTION (NSTEMI) (MULTI): ICD-10-CM

## 2025-03-31 PROCEDURE — 93798 PHYS/QHP OP CAR RHAB W/ECG: CPT | Performed by: INTERNAL MEDICINE

## 2025-04-02 ENCOUNTER — CLINICAL SUPPORT (OUTPATIENT)
Dept: CARDIAC REHAB | Facility: HOSPITAL | Age: 80
End: 2025-04-02
Payer: MEDICARE

## 2025-04-02 DIAGNOSIS — I21.4 NON-ST ELEVATION MYOCARDIAL INFARCTION (NSTEMI) (MULTI): ICD-10-CM

## 2025-04-02 PROCEDURE — 93798 PHYS/QHP OP CAR RHAB W/ECG: CPT | Performed by: INTERNAL MEDICINE

## 2025-04-04 ENCOUNTER — CLINICAL SUPPORT (OUTPATIENT)
Dept: CARDIAC REHAB | Facility: HOSPITAL | Age: 80
End: 2025-04-04
Payer: MEDICARE

## 2025-04-04 DIAGNOSIS — I21.4 NON-ST ELEVATION MYOCARDIAL INFARCTION (NSTEMI) (MULTI): ICD-10-CM

## 2025-04-04 PROCEDURE — 93798 PHYS/QHP OP CAR RHAB W/ECG: CPT | Performed by: INTERNAL MEDICINE

## 2025-04-07 ENCOUNTER — CLINICAL SUPPORT (OUTPATIENT)
Dept: CARDIAC REHAB | Facility: HOSPITAL | Age: 80
End: 2025-04-07
Payer: MEDICARE

## 2025-04-07 DIAGNOSIS — I21.4 NON-ST ELEVATION MYOCARDIAL INFARCTION (NSTEMI) (MULTI): ICD-10-CM

## 2025-04-07 PROCEDURE — 93798 PHYS/QHP OP CAR RHAB W/ECG: CPT | Performed by: INTERNAL MEDICINE

## 2025-04-08 ENCOUNTER — APPOINTMENT (OUTPATIENT)
Dept: CARDIOLOGY | Facility: CLINIC | Age: 80
End: 2025-04-08
Payer: MEDICARE

## 2025-04-09 ENCOUNTER — CLINICAL SUPPORT (OUTPATIENT)
Dept: CARDIAC REHAB | Facility: HOSPITAL | Age: 80
End: 2025-04-09
Payer: MEDICARE

## 2025-04-09 DIAGNOSIS — I21.4 NON-ST ELEVATION MYOCARDIAL INFARCTION (NSTEMI) (MULTI): ICD-10-CM

## 2025-04-09 PROCEDURE — 93798 PHYS/QHP OP CAR RHAB W/ECG: CPT | Performed by: INTERNAL MEDICINE

## 2025-04-11 ENCOUNTER — CLINICAL SUPPORT (OUTPATIENT)
Dept: CARDIAC REHAB | Facility: HOSPITAL | Age: 80
End: 2025-04-11
Payer: MEDICARE

## 2025-04-11 DIAGNOSIS — I21.4 NON-ST ELEVATION MYOCARDIAL INFARCTION (NSTEMI) (MULTI): ICD-10-CM

## 2025-04-11 PROCEDURE — 93798 PHYS/QHP OP CAR RHAB W/ECG: CPT | Performed by: INTERNAL MEDICINE

## 2025-04-14 ENCOUNTER — CLINICAL SUPPORT (OUTPATIENT)
Dept: CARDIAC REHAB | Facility: HOSPITAL | Age: 80
End: 2025-04-14
Payer: MEDICARE

## 2025-04-14 DIAGNOSIS — I21.4 NON-ST ELEVATION MYOCARDIAL INFARCTION (NSTEMI) (MULTI): ICD-10-CM

## 2025-04-14 PROCEDURE — 93798 PHYS/QHP OP CAR RHAB W/ECG: CPT | Performed by: INTERNAL MEDICINE

## 2025-04-16 ENCOUNTER — CLINICAL SUPPORT (OUTPATIENT)
Dept: CARDIAC REHAB | Facility: HOSPITAL | Age: 80
End: 2025-04-16
Payer: MEDICARE

## 2025-04-16 DIAGNOSIS — I21.4 NON-ST ELEVATION MYOCARDIAL INFARCTION (NSTEMI) (MULTI): ICD-10-CM

## 2025-04-16 PROCEDURE — 93798 PHYS/QHP OP CAR RHAB W/ECG: CPT | Performed by: INTERNAL MEDICINE

## 2025-04-18 ENCOUNTER — CLINICAL SUPPORT (OUTPATIENT)
Dept: CARDIAC REHAB | Facility: HOSPITAL | Age: 80
End: 2025-04-18
Payer: MEDICARE

## 2025-04-18 DIAGNOSIS — I21.4 NON-ST ELEVATION MYOCARDIAL INFARCTION (NSTEMI) (MULTI): ICD-10-CM

## 2025-04-21 ENCOUNTER — APPOINTMENT (OUTPATIENT)
Dept: CARDIAC REHAB | Facility: HOSPITAL | Age: 80
End: 2025-04-21
Payer: MEDICARE

## 2025-04-21 DIAGNOSIS — I21.4 NON-ST ELEVATION MYOCARDIAL INFARCTION (NSTEMI) (MULTI): ICD-10-CM

## 2025-04-21 PROCEDURE — 93798 PHYS/QHP OP CAR RHAB W/ECG: CPT | Performed by: INTERNAL MEDICINE

## 2025-04-23 ENCOUNTER — APPOINTMENT (OUTPATIENT)
Dept: CARDIAC REHAB | Facility: HOSPITAL | Age: 80
End: 2025-04-23
Payer: MEDICARE

## 2025-04-23 DIAGNOSIS — I21.4 NON-ST ELEVATION MYOCARDIAL INFARCTION (NSTEMI) (MULTI): ICD-10-CM

## 2025-04-23 PROCEDURE — 93798 PHYS/QHP OP CAR RHAB W/ECG: CPT | Performed by: INTERNAL MEDICINE

## 2025-04-25 ENCOUNTER — APPOINTMENT (OUTPATIENT)
Dept: CARDIAC REHAB | Facility: HOSPITAL | Age: 80
End: 2025-04-25
Payer: MEDICARE

## 2025-04-25 DIAGNOSIS — I21.4 NON-ST ELEVATION MYOCARDIAL INFARCTION (NSTEMI) (MULTI): ICD-10-CM

## 2025-04-25 PROCEDURE — 93798 PHYS/QHP OP CAR RHAB W/ECG: CPT | Performed by: INTERNAL MEDICINE

## 2025-04-25 NOTE — PROGRESS NOTES
Cardiac Rehabilitation Initial Treatment Plan - Discharge     Name: Kristin Martines  Medical Record Number: [id number]  YOB: 1945  Age: 79 y.o.    Today’s Date: 4/25/2025  Primary Care Physician: Jemal Helton MD  Referring Physician: Juan Manuel Castro MD  Program Location: [facility] CARDIAC REHAB     General  Primary Diagnosis: NSTEMI  1. Non-ST elevation myocardial infarction (NSTEMI) (Multi)  Follow Up In Cardiac Rehab         Onset/Date of Diagnosis: 12/10/2024     CR/ID/VR Sessions 36  attended  - completed program     AACVPR Risk Stratification:   Low    Falls Risk: Low  Patient instructed to attach safety clip to waist band while on treadmill the first day of class and at each session.    Psychosocial Assessment     Pre PH- Q 9 Survey Score: 8  Post PH Q 9: Survey Score:  5    Sent PH-Q 9 to MD if score > 20: PH Q-9 score less than 20 not sent to MD    Pt reported/currently experiencing stress: No  Patient uses stress management skills: Yes   History of: no history of anxiety or depression  Currently seeing a mental health provider: No  Social Support: Yes, Whom:   Family and friends  Quality of Life Survey: SF-36   SF-36 Pre Post   Physical  Functioning  Score 50.72 33.88   Role Limits- Physical  Score 37.26 51.96   General Health Score 52.93 48.17   Knowledge Assessment/Survey  Pre Survey Score: 11  Post Survey Score: 15  Learning Assessment:  Learning assessment/barriers: None  Preferred learning method: Auditory, Visual, and Writing handout  Barriers: None  Comments: Readiness to learn: Ready and willing to learn.    Stages of Change: Action    Psychosocial Plan    Goal Status: In progress    Psychosocial Goals:   Maintain or improve PHQ-9 Survey score by discharge.  Maintain or improve Post SF-36 Quality of Life Survey by discharge.       Psychosocial Interventions/Education:  PH Q-9 score reviewed with patient  PHQ-9 reviewed with pt and the Out patient Counseling resource sheet was  "provided     AMB CR/OR/VR Psychosocial Assessment- Discharge   PH Q-9 reviewed with patient and the Out Patient counseling resource list provided    Nutrition Assessment:    Hyperlipidemia: Yes     Lipids:   Lab Results   Component Value Date    CHOL 143 11/26/2024    HDL 55.5 11/26/2024    LDLF 43 03/17/2023    TRIG 96 11/26/2024       Current Dietary Guidelines: regular  Barriers to dietary change: no    Diet Habit Survey: [place] Dietary Assessment  Pre Survey Score:  58%  Post Survey Score: 67%     Diabetes Assessment    Lab Results   Component Value Date    HGBA1C 5.7 (H) 11/16/2024       History of Diabetes: No    Weight Management    Pre  Wt:  160.6 lb    Ht:  64   inches   Pre BMI:  27.5    Waist Cir: 38.25 inches    Post WT: 169.0 lb   BMI: 29.0      Post Waist Cir: 38.0 inches    Nutrition Plan    Goal Status: In Progress    Nutrition Goals:   Achieve a score of 80% or greater on Post [place] Dietary Assessment.  Attend at least 50% or more of Education classes provided by completion of program.    Nutrition Interventions/Education:   AHA handout \"What is Angina\"? And \"What is Cardiac Rehabilitation\"?  Lipid profile reviewed with patient during initial phone interview  Reviewed nutrition assessment with pt and the three lowest sections addressed. Healthy eating tip sheet provided.      AMB CR/OR/VR- Nutrition  - Discharge  Goal is 80 % or Greater. [place] score reviewed and the three lowest sections reviewed. Healthy Eating tip sheet givn.       Exercise Assessment  Pt does some exercise at home.  Mode: walking   Frequency: 3x a week  Duration: 30-40 minutes    Exercise Prescription     Exercise Prescription based on: Pre Rehab Duke Activity Status Index (DASI) and Health History:  Dasi Score: 24.2  Met Score: 2.8    Frequency:  3 days/week   Mode: Treadmill, NuStep /NuStep (T), Airdyne, Arm Ergometer, Recumbent Bike   Duration: 45 total aerobic minutes   Intensity: RPE 11-15  Target HR:   " beats per minute  MET Level: 2.8   Patient wears supplemental O2: No     Modality Workload METs Duration (minutes)   1 Pre-Exercise/Rest  ---- -- 5:00   2 NuStep  4000  68 carias, load 4  3.0 15:00   3 NuStep  T5  48 carias, load 4 3.0 15:00   4 Treadmill   Walk 2.0 mph, 2.0% incline   3.1 15:00   5 Airdyne    0.8 load, 25-30  RPM   2.5 15:00   6    Arm Ergometer  18 carias, level 1  2.2 15.:00   7     Recumbent Bike  3 load, 55 RPM  2.8 15:00   8     Post-Exercise -- -- 5:00     Resistance Training: No   Home Exercise Prescription given: 2/25/25    Exercise Plan    Goal Status: In Progress    Exercise Goals:   150 min/week of moderate intensity aerobic exercise by completion of program.  Exercise Frequency 5-7 days a week by completion of program.  To increase exercise met level by 40% by the completion of the program.    Exercise Interventions/Education:   Increased exercise duration by 1 minute per modality for a total of 45 minutes.  Increased exercise intensity om TM/Rec bike based on patient's HR/BP/RPE .  Benefits of Exercise Attended education on Benefits of Exercise.      AMB CR/FL/VR Exercise-  Discharge  Goal is 40% or greater. She was able to achieve a 48% increase.       Other Core Components/Risk Factor Assessment:    Medication adherence  Current Medications:   Medication Documentation Review Audit       Reviewed by Juan Manuel Castro MD (Physician) on 03/26/25 at 1120      Medication Order Taking? Sig Documenting Provider Last Dose Status   acetaminophen (Tylenol) 325 mg tablet [id number] No Take 1 tablet (325 mg) by mouth every 4 hours if needed for mild pain (1 - 3). Alma Rosa Bingham MD 12/7/2024 Active   acetaminophen (Tylenol) 325 mg tablet [id number]  Take 2 tablets (650 mg) by mouth every 4 hours if needed for mild pain (1 - 3). Jemal Helton MD  Active   ALPRAZolam (Xanax) 0.25 mg tablet [id number] No Take 1 tablet (0.25 mg) by mouth once daily at bedtime. Alma Rosa Bingham MD 12/7/2024  Evening  24 3473   apixaban (Eliquis) 5 mg tablet [id number]  Take 1 tablet (5 mg) by mouth every 12 hours. Juan Manuel Castro MD  Active   aspirin 81 mg chewable tablet [id number] No Chew 1 tablet (81 mg) once daily. Alma Rosa Bingham MD 2024 Morning Active   benzonatate (Tessalon) 200 mg capsule [id number]  Take 1 capsule (200 mg) by mouth 3 times a day as needed for cough. Do not crush or chew. Jemal Helton MD  Active   cycloSPORINE (Restasis) 0.05 % ophthalmic emulsion [id number] No Administer 1 drop into both eyes 2 times a day. Alma Rosa Bingham MD 2024 Evening Active   doxazosin (Cardura) 2 mg tablet [id number] No Take 2 tablets (4 mg) by mouth once daily at bedtime. Juan Manuel Castro MD 2024 Evening Active   ergocalciferol (Vitamin D-2) 1.25 MG (61021 UT) capsule [id number] No Take 1 capsule (50,000 Units) by mouth 1 (one) time per week. Every monday Alma Rosa Bingham MD 2024 Active   estradiol (Estrace) 0.01 % (0.1 mg/gram) vaginal cream [id number] No Insert 0.25 Applicatorfuls (1 g) into the vagina 2 times a week. Alma Rosa Bingham MD Not Taking Active   evolocumab (Repatha SureClick) 140 mg/mL injection [id number] No Inject 1 mL (140 mg) under the skin every 14 (fourteen) days. Juan Manuel Castro MD 2024 Active   famotidine (Pepcid) 40 mg tablet [id number] No Take 1 tablet (40 mg) by mouth once daily at bedtime. Alma Rosa Bingham MD 2024 Active   hydrOXYzine pamoate (Vistaril) 25 mg capsule [id number]  Take 1 tablet by mouth every 6 hours. Alma Rosa Bingham MD  Active   levothyroxine (Synthroid, Levoxyl) 75 mcg tablet [id number] No Take 1 tablet (75 mcg) by mouth 5 times a week. Exclude weekends Alma Rosa Bingham MD 2024 Active   metoprolol succinate XL (Toprol-XL) 100 mg 24 hr tablet [id number]  Take 1 tablet (100 mg) by mouth 2 times a day. Do not crush or chew. Juan Manuel Castro MD  Active   multivitamin (MULTIPLE VITAMINS ORAL) [id number] No Take 1  "tablet by mouth once daily. Historical Provider, MD 12/7/2024 Morning Active   sertraline (Zoloft) 50 mg tablet [id number] No Take 1 tablet (50 mg) by mouth once daily. Alma Rosa Bingham MD 12/7/2024 Morning Active   sodium chloride (Ocean) 0.65 % nasal spray [id number]  Administer 1 spray into each nostril 4 times a day as needed for congestion. Jemal Helton MD  Active                 Allergies: Amoxicillian, Cephalexin, Ciprofloxin, Esomeprazole, Magnesium, Ezetimibe, Oxycodone-acetaminophen, Sulfamethoxazole-trimethoprim                Medication compliance: Yes   Uses pill box/organizer: Yes    Carries medication list: Yes     Blood Pressure Management  History of Hypertension: Yes   Medication Changes: No   Resting BP:  144/80 on 1/8/2025 ( See Session reports for all VS documented during class sessions)    Heart Failure Management  Hx of Heart Failure: No    Smoking/Tobacco Assessment  Social History     Tobacco Use   Smoking Status Never   Smokeless Tobacco Never   Other forms of tobacco: No,denies                  Anyone in the home smoke: No. denies    Other Core Component Plan    Goal Status: In Progress    Other Core Component Goals:   Medication compliance established during initial phone interview.  Pt carries updated medication list with them at all times  Resting BP <130/80    Other Core Component Interventions/Education:   Patient given AHA educational handout \"How Do I Manage My Medications\"?  Resting BP readings taken pre and post exercise at each session.  Pt attended education lecture on Cardiac Medications provided by Pharmacist.  Patient attended education on Heart Failure, Anatomy of the Heart, Coronary Stents, Stress Testing and Physiology of Aging.    [facility] AMB CR/KS/VR Other Core Components- Discharge   Continue taken Repatha as ordered. Continue Heart Healthy diet and exercise.     EDUCATIONAL Classes:   Fiber and Plant based eating(Dietitian) 1/29/25, Anatomy of the Heart 02/05/25, " "Heart Failure 02/12/25, Cardiac Medications (Pharmacist) 02/19/25, Benefits of Exercise 2/26/25, Don't be \"Salty\" (Dietitian) 3/5/25, Coronary Stents 3/12/25, Stress Testing 3/19/25, Physiology of aging 3/26/25, Cardiac Medications( Pharmacist/) 4/2/25     Cardiac Related ER Visits: 0                                     Hospital Admits: 0      Individual Patient Goals:    Patient wants to establish exercise routine 3-5 days/week,30-60 min/ day by completion of program.  Goal Status: In Progress- She [facility]. She is planning on going 3-5 days/week,30-60 min/day for cardiovascular exercise.   Patient wants to build strength and endurance in the program by completion of program.  Goal Status: Met- She stated she did build strength and endurance.     Staff Comments:  Pt is being discharged from the program after completing all 36 sessions. She is planning to [facility] the local [facility]. She is going to go 3-5 days/week,30-60 min/day for cardiovascular exercise. She did state she gained strength and endurance. [place] score reviewed and the three lowest sections addressed. Healthy eating tip sheet provided. PH Q-9 reviewed and the Out Patient counseling resource list given. Med list reviewed.       Rehab Staff Signature: Aby Santana RN    PHYSICIAN REVIEW AND RESPONSE: ( See Physician electronic signature)             "

## 2025-04-30 ENCOUNTER — OFFICE VISIT (OUTPATIENT)
Dept: CARDIOLOGY | Facility: CLINIC | Age: 80
End: 2025-04-30
Payer: MEDICARE

## 2025-04-30 ENCOUNTER — HOSPITAL ENCOUNTER (OUTPATIENT)
Dept: RADIOLOGY | Facility: HOSPITAL | Age: 80
Discharge: HOME | End: 2025-04-30
Payer: MEDICARE

## 2025-04-30 VITALS
BODY MASS INDEX: 28.68 KG/M2 | DIASTOLIC BLOOD PRESSURE: 78 MMHG | OXYGEN SATURATION: 94 % | HEIGHT: 64 IN | HEART RATE: 62 BPM | WEIGHT: 168 LBS | SYSTOLIC BLOOD PRESSURE: 120 MMHG

## 2025-04-30 DIAGNOSIS — I25.10 CORONARY ARTERY DISEASE INVOLVING NATIVE CORONARY ARTERY OF NATIVE HEART WITHOUT ANGINA PECTORIS: ICD-10-CM

## 2025-04-30 DIAGNOSIS — E78.00 PURE HYPERCHOLESTEROLEMIA: ICD-10-CM

## 2025-04-30 DIAGNOSIS — E03.9 HYPOTHYROIDISM, UNSPECIFIED TYPE: ICD-10-CM

## 2025-04-30 DIAGNOSIS — I10 PRIMARY HYPERTENSION: ICD-10-CM

## 2025-04-30 DIAGNOSIS — K21.9 GASTROESOPHAGEAL REFLUX DISEASE WITHOUT ESOPHAGITIS: ICD-10-CM

## 2025-04-30 DIAGNOSIS — Z95.1 S/P CABG X 4: ICD-10-CM

## 2025-04-30 DIAGNOSIS — R06.09 EXERTIONAL DYSPNEA: Primary | ICD-10-CM

## 2025-04-30 LAB
ATRIAL RATE: 57 BPM
P AXIS: 61 DEGREES
P OFFSET: 188 MS
P ONSET: 124 MS
PR INTERVAL: 186 MS
Q ONSET: 217 MS
QRS COUNT: 10 BEATS
QRS DURATION: 88 MS
QT INTERVAL: 432 MS
QTC CALCULATION(BAZETT): 420 MS
QTC FREDERICIA: 424 MS
R AXIS: 7 DEGREES
T AXIS: 18 DEGREES
T OFFSET: 433 MS
VENTRICULAR RATE: 57 BPM

## 2025-04-30 PROCEDURE — 71046 X-RAY EXAM CHEST 2 VIEWS: CPT | Performed by: STUDENT IN AN ORGANIZED HEALTH CARE EDUCATION/TRAINING PROGRAM

## 2025-04-30 PROCEDURE — 1159F MED LIST DOCD IN RCRD: CPT | Performed by: INTERNAL MEDICINE

## 2025-04-30 PROCEDURE — 93005 ELECTROCARDIOGRAM TRACING: CPT | Performed by: INTERNAL MEDICINE

## 2025-04-30 PROCEDURE — 99214 OFFICE O/P EST MOD 30 MIN: CPT | Performed by: INTERNAL MEDICINE

## 2025-04-30 PROCEDURE — 3074F SYST BP LT 130 MM HG: CPT | Performed by: INTERNAL MEDICINE

## 2025-04-30 PROCEDURE — 1160F RVW MEDS BY RX/DR IN RCRD: CPT | Performed by: INTERNAL MEDICINE

## 2025-04-30 PROCEDURE — 71046 X-RAY EXAM CHEST 2 VIEWS: CPT

## 2025-04-30 PROCEDURE — 93010 ELECTROCARDIOGRAM REPORT: CPT | Performed by: INTERNAL MEDICINE

## 2025-04-30 PROCEDURE — 1036F TOBACCO NON-USER: CPT | Performed by: INTERNAL MEDICINE

## 2025-04-30 PROCEDURE — 3078F DIAST BP <80 MM HG: CPT | Performed by: INTERNAL MEDICINE

## 2025-04-30 NOTE — ASSESSMENT & PLAN NOTE
12/10/24 coronary angiography ([facility] mild LAD 45-50% with atretic LIMA to LAD, mid RCA  with left to right collaterals PLV, patent SVG to diagonal

## 2025-04-30 NOTE — PROGRESS NOTES
"  Subjective  Kristin Martines  is a 79 y.o. year old female who presents for ASHD F/U.  Notes LI with going up the steps. No chest pain, occ. Palpitations, no edema    Blood pressure 120/78, pulse 62, height 1.626 m (5' 4\"), weight 76.2 kg (168 lb), SpO2 94%.   Amoxicillin-pot clavulanate, Cephalexin, Ciprofloxacin, Codeine, Erythromycin base, Esomeprazole magnesium, Ezetimibe, Oxycodone-acetaminophen, and Sulfamethoxazole-trimethoprim  Medical History[1]  Surgical History[2]  Family History[3]  @SOC    Current Medications[4]     ROS  Review of Systems   All other systems reviewed and are negative.      Physical Exam  Physical Exam  Constitutional:       Appearance: Normal appearance.   HENT:      Head: Normocephalic and atraumatic.   Cardiovascular:      Rate and Rhythm: Normal rate and regular rhythm.   Pulmonary:      Effort: Pulmonary effort is normal.      Breath sounds: Normal breath sounds.   Abdominal:      General: Abdomen is flat.   Musculoskeletal:      Right lower leg: No edema.      Left lower leg: No edema.   Skin:     General: Skin is warm and dry.   Neurological:      General: No focal deficit present.      Mental Status: She is alert and oriented to person, place, and time.   Psychiatric:         Mood and Affect: Mood normal.         Behavior: Behavior normal.          EKG  Encounter Date: 03/26/25   ECG 12 Lead   Result Value    Ventricular Rate 61    Atrial Rate 61    MD Interval 172    QRS Duration 86    QT Interval 414    QTC Calculation(Bazett) 416    P Axis 41    R Axis 8    T Axis 16    QRS Count 10    Q Onset 217    P Onset 131    P Offset 190    T Offset 424    QTC Fredericia 416    Narrative    Normal sinus rhythm  Minimal voltage criteria for LVH, may be normal variant ( R in aVL )  Possible Inferior infarct (cited on or before 17-DEC-2024)  Abnormal ECG  When compared with ECG of 08-JAN-2025 10:31,  No significant change was found  Confirmed by Juan Manuel Castro ([id number]) on 3/26/2025 " 6:25:50 PM       Problem List Items Addressed This Visit       Coronary artery disease    Relevant Orders    XR chest 2 views    Follow Up In Cardiology    Exertional dyspnea - Primary    3/26/25 normal CBC, BMP         Hyperlipidemia    Primary hypertension    Relevant Orders    ECG 12 Lead    Hypothyroidism    S/P CABG x 4    [facility] 45-50% with atretic LIMA to LAD, mid RCA  with left to right collaterals PLV, patent SVG to diagonal         GERD (gastroesophageal reflux disease)         Chest xray PA/Lat  Return 1 month      Juan Manuel Castro MD        [1]   Past Medical History:  Diagnosis Date    Encounter for screening for malignant neoplasm of vagina     Vaginal Pap smear    Other specified noninflammatory disorders of vulva and perineum     Vulval lesion    Personal history of other medical treatment 11/01/2019    H/O bone density study    Personal history of other medical treatment     H/O mammogram   [2]   Past Surgical History:  Procedure Laterality Date    CARDIAC CATHETERIZATION N/A 12/10/2024    Procedure: Left Heart Cath, With LV;  Surgeon: Lambert Elizabeth MD PhD;  Location: [facility] Cardiac Cath Lab;  Service: Cardiovascular;  Laterality: N/A;  Radial    FOOT SURGERY  12/02/2019    Foot Surgery    OTHER SURGICAL HISTORY  08/09/2021    Cardiac catheterization    OTHER SURGICAL HISTORY  04/29/2020    Coronary artery bypass graft    OTHER SURGICAL HISTORY  12/02/2019    Wrist Surgery    OTHER SURGICAL HISTORY  12/02/2019    Biopsy Vulvar   [3]   Family History  Problem Relation Name Age of Onset    Hypertension Mother      Heart attack Father      Asthma Other family hx     Other (cardiac disorder) Other family hx     Hypertension Other family hx    [4]   Current Outpatient Medications   Medication Sig Dispense Refill    acetaminophen (Tylenol) 325 mg tablet Take 1 tablet (325 mg) by mouth every 4 hours if needed for mild pain (1 - 3).      acetaminophen (Tylenol)  325 mg tablet Take 2 tablets (650 mg) by mouth every 4 hours if needed for mild pain (1 - 3). 30 tablet 0    ALPRAZolam (Xanax) 0.25 mg tablet Take 1 tablet (0.25 mg) by mouth once daily at bedtime.      apixaban (Eliquis) 5 mg tablet Take 1 tablet (5 mg) by mouth every 12 hours. 180 tablet 3    aspirin 81 mg chewable tablet Chew 1 tablet (81 mg) once daily.      benzonatate (Tessalon) 200 mg capsule Take 1 capsule (200 mg) by mouth 3 times a day as needed for cough. Do not crush or chew. 20 capsule 0    cycloSPORINE (Restasis) 0.05 % ophthalmic emulsion Administer 1 drop into both eyes 2 times a day.      doxazosin (Cardura) 2 mg tablet Take 2 tablets (4 mg) by mouth once daily at bedtime.      ergocalciferol (Vitamin D-2) 1.25 MG (11218 UT) capsule Take 1 capsule (50,000 Units) by mouth 1 (one) time per week. Every monday      estradiol (Estrace) 0.01 % (0.1 mg/gram) vaginal cream Insert 0.25 Applicatorfuls (1 g) into the vagina 2 times a week.      evolocumab (Repatha SureClick) 140 mg/mL injection Inject 1 mL (140 mg) under the skin every 14 (fourteen) days. 2 each 3    famotidine (Pepcid) 40 mg tablet Take 1 tablet (40 mg) by mouth once daily at bedtime.      hydrOXYzine pamoate (Vistaril) 25 mg capsule Take 1 tablet by mouth every 6 hours.      levothyroxine (Synthroid, Levoxyl) 75 mcg tablet Take 1 tablet (75 mcg) by mouth 5 times a week. Exclude weekends      metoprolol succinate XL (Toprol-XL) 100 mg 24 hr tablet Take 1 tablet (100 mg) by mouth 2 times a day. Do not crush or chew. 180 tablet 3    multivitamin (MULTIPLE VITAMINS ORAL) Take 1 tablet by mouth once daily.      sertraline (Zoloft) 50 mg tablet Take 1 tablet (50 mg) by mouth once daily.      sodium chloride (Ocean) 0.65 % nasal spray Administer 1 spray into each nostril 4 times a day as needed for congestion. 44 mL 12     No current facility-administered medications for this visit.

## 2025-05-14 ENCOUNTER — TELEPHONE (OUTPATIENT)
Dept: CARDIOLOGY | Facility: CLINIC | Age: 80
End: 2025-05-14
Payer: MEDICARE

## 2025-05-14 DIAGNOSIS — E78.00 PURE HYPERCHOLESTEROLEMIA: ICD-10-CM

## 2025-05-14 RX ORDER — EVOLOCUMAB 140 MG/ML
140 INJECTION, SOLUTION SUBCUTANEOUS
Qty: 6 EACH | Refills: 3 | Status: SHIPPED | OUTPATIENT
Start: 2025-05-14

## 2025-05-29 ENCOUNTER — OFFICE VISIT (OUTPATIENT)
Dept: CARDIOLOGY | Facility: CLINIC | Age: 80
End: 2025-05-29
Payer: MEDICARE

## 2025-05-29 VITALS — DIASTOLIC BLOOD PRESSURE: 80 MMHG | HEART RATE: 63 BPM | OXYGEN SATURATION: 98 % | SYSTOLIC BLOOD PRESSURE: 118 MMHG

## 2025-05-29 DIAGNOSIS — K21.9 GASTROESOPHAGEAL REFLUX DISEASE WITHOUT ESOPHAGITIS: ICD-10-CM

## 2025-05-29 DIAGNOSIS — E78.00 PURE HYPERCHOLESTEROLEMIA: ICD-10-CM

## 2025-05-29 DIAGNOSIS — I10 PRIMARY HYPERTENSION: ICD-10-CM

## 2025-05-29 DIAGNOSIS — Z95.1 S/P CABG X 4: Primary | ICD-10-CM

## 2025-05-29 DIAGNOSIS — I25.10 CORONARY ARTERY DISEASE INVOLVING NATIVE CORONARY ARTERY OF NATIVE HEART WITHOUT ANGINA PECTORIS: ICD-10-CM

## 2025-05-29 DIAGNOSIS — M48.00 SPINAL STENOSIS, UNSPECIFIED SPINAL REGION: ICD-10-CM

## 2025-05-29 DIAGNOSIS — R06.09 EXERTIONAL DYSPNEA: ICD-10-CM

## 2025-05-29 PROCEDURE — 1160F RVW MEDS BY RX/DR IN RCRD: CPT | Performed by: INTERNAL MEDICINE

## 2025-05-29 PROCEDURE — 3078F DIAST BP <80 MM HG: CPT | Performed by: INTERNAL MEDICINE

## 2025-05-29 PROCEDURE — 1036F TOBACCO NON-USER: CPT | Performed by: INTERNAL MEDICINE

## 2025-05-29 PROCEDURE — 99212 OFFICE O/P EST SF 10 MIN: CPT | Performed by: INTERNAL MEDICINE

## 2025-05-29 PROCEDURE — 1159F MED LIST DOCD IN RCRD: CPT | Performed by: INTERNAL MEDICINE

## 2025-05-29 PROCEDURE — 3074F SYST BP LT 130 MM HG: CPT | Performed by: INTERNAL MEDICINE

## 2025-05-29 PROCEDURE — 99214 OFFICE O/P EST MOD 30 MIN: CPT | Performed by: INTERNAL MEDICINE

## 2025-05-29 RX ORDER — LEVOFLOXACIN 500 MG/1
1 TABLET, FILM COATED ORAL
COMMUNITY
Start: 2025-01-03

## 2025-05-29 NOTE — ASSESSMENT & PLAN NOTE
12/10/24 coronary angiograms ([facility]) mild LAD 45-50% with atretic LIMA to LAD, mid RCA  with left to right collaterals, PLV, patent SVG to diag.

## 2025-05-29 NOTE — PROGRESS NOTES
Subjective  Kristin Martines  is a 79 y.o. year old female who presents for Dyspnea f/U    Blood pressure 118/80, pulse 63, SpO2 98%.   Amoxicillin-pot clavulanate, Cephalexin, Ciprofloxacin, Codeine, Erythromycin base, Esomeprazole magnesium, Ezetimibe, Oxycodone-acetaminophen, and Sulfamethoxazole-trimethoprim  Medical History[1]  Surgical History[2]  Family History[3]  @SOC    Current Medications[4]     ROS  Review of Systems   All other systems reviewed and are negative.      Physical Exam  Physical Exam  Constitutional:       Appearance: Normal appearance.   HENT:      Head: Normocephalic.   Cardiovascular:      Rate and Rhythm: Normal rate and regular rhythm.   Pulmonary:      Effort: Pulmonary effort is normal.      Breath sounds: Normal breath sounds.   Abdominal:      General: Abdomen is flat.   Musculoskeletal:      Right lower leg: No edema.      Left lower leg: No edema.   Skin:     General: Skin is warm and dry.   Neurological:      General: No focal deficit present.      Mental Status: She is alert and oriented to person, place, and time.   Psychiatric:         Mood and Affect: Mood normal.         Behavior: Behavior normal.          EKG  Encounter Date: 04/30/25   ECG 12 Lead   Result Value    Ventricular Rate 57    Atrial Rate 57    OK Interval 186    QRS Duration 88    QT Interval 432    QTC Calculation(Bazett) 420    P Axis 61    R Axis 7    T Axis 18    QRS Count 10    Q Onset 217    P Onset 124    P Offset 188    T Offset 433    QTC Fredericia 424    Narrative    Sinus bradycardia  Possible Left atrial enlargement  Minimal voltage criteria for LVH, may be normal variant ( R in aVL )  Possible Inferior infarct (cited on or before 17-DEC-2024)  Abnormal ECG  When compared with ECG of 26-MAR-2025 11:11,  No significant change was found  Confirmed by Juan Manuel Castro ([id number]) on 4/30/2025 5:09:55 PM       Problem List Items Addressed This Visit       Coronary artery disease    Relevant Orders    Follow  Up In Cardiology    Exertional dyspnea    4/30/25 CXR  NAD, 3/26/25 H/H =12.7/38.6, BMP okay         Hyperlipidemia    Primary hypertension    Spinal stenosis    S/P CABG x 4 - Primary    12/10/24 coronary angiograms ([facility]) mild LAD 45-50% with atretic LIMA to LAD, mid RCA  with left to right collaterals, PLV, patent SVG to diag.         Relevant Orders    Follow Up In Cardiology    GERD (gastroesophageal reflux disease)         Same meds  Return 3 months with EKG      Juan Manuel Castro MD        [1]   Past Medical History:  Diagnosis Date    Encounter for screening for malignant neoplasm of vagina     Vaginal Pap smear    Other specified noninflammatory disorders of vulva and perineum     Vulval lesion    Personal history of other medical treatment 11/01/2019    H/O bone density study    Personal history of other medical treatment     H/O mammogram   [2]   Past Surgical History:  Procedure Laterality Date    CARDIAC CATHETERIZATION N/A 12/10/2024    Procedure: Left Heart Cath, With LV;  Surgeon: Lambert Elizabeth MD PhD;  Location: [facility] Cardiac Cath Lab;  Service: Cardiovascular;  Laterality: N/A;  Radial    FOOT SURGERY  12/02/2019    Foot Surgery    OTHER SURGICAL HISTORY  08/09/2021    Cardiac catheterization    OTHER SURGICAL HISTORY  04/29/2020    Coronary artery bypass graft    OTHER SURGICAL HISTORY  12/02/2019    Wrist Surgery    OTHER SURGICAL HISTORY  12/02/2019    Biopsy Vulvar   [3]   Family History  Problem Relation Name Age of Onset    Hypertension Mother      Heart attack Father      Asthma Other family hx     Other (cardiac disorder) Other family hx     Hypertension Other family hx    [4]   Current Outpatient Medications   Medication Sig Dispense Refill    levoFLOXacin (Levaquin) 500 mg tablet Take 1 tablet (500 mg) by mouth early in the morning..      acetaminophen (Tylenol) 325 mg tablet Take 1 tablet (325 mg) by mouth every 4 hours if needed for mild pain (1 - 3).      acetaminophen  (Tylenol) 325 mg tablet Take 2 tablets (650 mg) by mouth every 4 hours if needed for mild pain (1 - 3). 30 tablet 0    ALPRAZolam (Xanax) 0.25 mg tablet Take 1 tablet (0.25 mg) by mouth once daily at bedtime.      apixaban (Eliquis) 5 mg tablet Take 1 tablet (5 mg) by mouth every 12 hours. 180 tablet 3    aspirin 81 mg chewable tablet Chew 1 tablet (81 mg) once daily.      benzonatate (Tessalon) 200 mg capsule Take 1 capsule (200 mg) by mouth 3 times a day as needed for cough. Do not crush or chew. 20 capsule 0    cycloSPORINE (Restasis) 0.05 % ophthalmic emulsion Administer 1 drop into both eyes 2 times a day.      doxazosin (Cardura) 2 mg tablet Take 2 tablets (4 mg) by mouth once daily at bedtime.      ergocalciferol (Vitamin D-2) 1.25 MG (65637 UT) capsule Take 1 capsule (50,000 Units) by mouth 1 (one) time per week. Every monday      estradiol (Estrace) 0.01 % (0.1 mg/gram) vaginal cream Insert 0.25 Applicatorfuls (1 g) into the vagina 2 times a week.      evolocumab (Repatha SureClick) 140 mg/mL injection Inject 1 mL (140 mg) under the skin every 14 (fourteen) days. 6 each 3    famotidine (Pepcid) 40 mg tablet Take 1 tablet (40 mg) by mouth once daily at bedtime.      hydrOXYzine pamoate (Vistaril) 25 mg capsule Take 1 tablet by mouth every 6 hours.      levothyroxine (Synthroid, Levoxyl) 75 mcg tablet Take 1 tablet (75 mcg) by mouth 5 times a week. Exclude weekends      metoprolol succinate XL (Toprol-XL) 100 mg 24 hr tablet Take 1 tablet (100 mg) by mouth 2 times a day. Do not crush or chew. 180 tablet 3    multivitamin (MULTIPLE VITAMINS ORAL) Take 1 tablet by mouth once daily.      sertraline (Zoloft) 50 mg tablet Take 1 tablet (50 mg) by mouth once daily.      sodium chloride (Ocean) 0.65 % nasal spray Administer 1 spray into each nostril 4 times a day as needed for congestion. 44 mL 12     No current facility-administered medications for this visit.

## 2025-06-25 ENCOUNTER — APPOINTMENT (OUTPATIENT)
Dept: CARDIOLOGY | Facility: CLINIC | Age: 80
End: 2025-06-25
Payer: MEDICARE

## 2025-07-24 ENCOUNTER — OFFICE VISIT (OUTPATIENT)
Dept: CARDIOLOGY | Facility: CLINIC | Age: 80
End: 2025-07-24
Payer: MEDICARE

## 2025-07-24 VITALS
HEIGHT: 64 IN | SYSTOLIC BLOOD PRESSURE: 126 MMHG | WEIGHT: 165 LBS | HEART RATE: 57 BPM | BODY MASS INDEX: 28.17 KG/M2 | DIASTOLIC BLOOD PRESSURE: 82 MMHG

## 2025-07-24 DIAGNOSIS — I25.10 CORONARY ARTERY DISEASE INVOLVING NATIVE CORONARY ARTERY OF NATIVE HEART WITHOUT ANGINA PECTORIS: Primary | ICD-10-CM

## 2025-07-24 DIAGNOSIS — I48.0 PAROXYSMAL ATRIAL FIBRILLATION (MULTI): ICD-10-CM

## 2025-07-24 DIAGNOSIS — E03.9 HYPOTHYROIDISM, UNSPECIFIED TYPE: ICD-10-CM

## 2025-07-24 DIAGNOSIS — Z95.1 S/P CABG X 4: ICD-10-CM

## 2025-07-24 DIAGNOSIS — K21.9 GASTROESOPHAGEAL REFLUX DISEASE WITHOUT ESOPHAGITIS: ICD-10-CM

## 2025-07-24 DIAGNOSIS — E78.00 PURE HYPERCHOLESTEROLEMIA: ICD-10-CM

## 2025-07-24 PROBLEM — I25.2: Status: RESOLVED | Noted: 2023-09-06 | Resolved: 2025-07-24

## 2025-07-24 PROBLEM — I20.9 ACUTE ANGINA: Status: RESOLVED | Noted: 2023-09-06 | Resolved: 2025-07-24

## 2025-07-24 LAB
ATRIAL RATE: 55 BPM
P AXIS: 9 DEGREES
P OFFSET: 184 MS
P ONSET: 125 MS
PR INTERVAL: 184 MS
Q ONSET: 217 MS
QRS COUNT: 9 BEATS
QRS DURATION: 88 MS
QT INTERVAL: 428 MS
QTC CALCULATION(BAZETT): 409 MS
QTC FREDERICIA: 415 MS
R AXIS: 16 DEGREES
T AXIS: 23 DEGREES
T OFFSET: 431 MS
VENTRICULAR RATE: 55 BPM

## 2025-07-24 PROCEDURE — 3074F SYST BP LT 130 MM HG: CPT | Performed by: INTERNAL MEDICINE

## 2025-07-24 PROCEDURE — 99214 OFFICE O/P EST MOD 30 MIN: CPT | Performed by: INTERNAL MEDICINE

## 2025-07-24 PROCEDURE — 99212 OFFICE O/P EST SF 10 MIN: CPT | Mod: 25 | Performed by: MARRIAGE & FAMILY THERAPIST

## 2025-07-24 PROCEDURE — 1036F TOBACCO NON-USER: CPT | Performed by: INTERNAL MEDICINE

## 2025-07-24 PROCEDURE — 1159F MED LIST DOCD IN RCRD: CPT | Performed by: INTERNAL MEDICINE

## 2025-07-24 PROCEDURE — 93005 ELECTROCARDIOGRAM TRACING: CPT | Performed by: INTERNAL MEDICINE

## 2025-07-24 PROCEDURE — 93010 ELECTROCARDIOGRAM REPORT: CPT | Performed by: INTERNAL MEDICINE

## 2025-07-24 PROCEDURE — 1160F RVW MEDS BY RX/DR IN RCRD: CPT | Performed by: INTERNAL MEDICINE

## 2025-07-24 PROCEDURE — 3079F DIAST BP 80-89 MM HG: CPT | Performed by: INTERNAL MEDICINE

## 2025-07-24 NOTE — PROGRESS NOTES
"  Subjective  Kristin Martines  is a 79 y.o. year old female who presents for F/U ASHD.  Notes palpitations at night after takes doxazosin decreased when she decreased the dose.  No chest pain, no dyspnea, no edema    Blood pressure 126/82, pulse 57, height 1.626 m (5' 4\"), weight 74.8 kg (165 lb).   Amoxicillin-pot clavulanate, Cephalexin, Ciprofloxacin, Codeine, Erythromycin base, Esomeprazole magnesium, Ezetimibe, Oxycodone-acetaminophen, and Sulfamethoxazole-trimethoprim  Medical History[1]  Surgical History[2]  Family History[3]  @SOC    Current Medications[4]     ROS  Review of Systems   All other systems reviewed and are negative.      Physical Exam  Physical Exam  Constitutional:       Appearance: Normal appearance.   HENT:      Head: Normocephalic and atraumatic.     Cardiovascular:      Rate and Rhythm: Normal rate and regular rhythm.   Pulmonary:      Effort: Pulmonary effort is normal.      Breath sounds: Normal breath sounds.   Abdominal:      General: Abdomen is flat.     Musculoskeletal:      Right lower leg: No edema.      Left lower leg: No edema.     Skin:     General: Skin is warm and dry.     Neurological:      General: No focal deficit present.      Mental Status: She is alert and oriented to person, place, and time.     Psychiatric:         Mood and Affect: Mood normal.         Behavior: Behavior normal.          EKG  Encounter Date: 07/24/25   ECG 12 lead (Clinic Performed)   Result Value    Ventricular Rate 55    Atrial Rate 55    OR Interval 184    QRS Duration 88    QT Interval 428    QTC Calculation(Bazett) 409    P Axis 9    R Axis 16    T Axis 23    QRS Count 9    Q Onset 217    P Onset 125    P Offset 184    T Offset 431    QTC Fredericia 415    Narrative    Sinus bradycardia  Cannot rule out Anterior infarct , age undetermined  Abnormal ECG  When compared with ECG of 30-APR-2025 13:57,  Borderline criteria for Inferior infarct are no longer Present       Problem List Items Addressed " This Visit       Coronary artery disease - Primary    7/24/25 EKG sinus bradycardai at 58/min., otherwise WNL         Hyperlipidemia    Hypothyroidism    S/P CABG x 4    [facility]50% with atretic LIMA to LAD, mid RCA  with left to rightr collaerals         Paroxysmal atrial fibrillation (Multi)    Relevant Orders    ECG 12 lead (Clinic Performed) (Completed)    GERD (gastroesophageal reflux disease)         Same meds  Return August appointmentwith EKG      Juan Manuel Castro MD          [1]   Past Medical History:  Diagnosis Date    Encounter for screening for malignant neoplasm of vagina     Vaginal Pap smear    Other specified noninflammatory disorders of vulva and perineum     Vulval lesion    Personal history of other medical treatment 11/01/2019    H/O bone density study    Personal history of other medical treatment     H/O mammogram   [2]   Past Surgical History:  Procedure Laterality Date    CARDIAC CATHETERIZATION N/A 12/10/2024    Procedure: Left Heart Cath, With LV;  Surgeon: Lambert Elizabeth MD PhD;  Location: [facility] Cardiac Cath Lab;  Service: Cardiovascular;  Laterality: N/A;  Radial    FOOT SURGERY  12/02/2019    Foot Surgery    OTHER SURGICAL HISTORY  08/09/2021    Cardiac catheterization    OTHER SURGICAL HISTORY  04/29/2020    Coronary artery bypass graft    OTHER SURGICAL HISTORY  12/02/2019    Wrist Surgery    OTHER SURGICAL HISTORY  12/02/2019    Biopsy Vulvar   [3]   Family History  Problem Relation Name Age of Onset    Hypertension Mother      Heart attack Father      Asthma Other family hx     Other (cardiac disorder) Other family hx     Hypertension Other family hx    [4]   Current Outpatient Medications   Medication Sig Dispense Refill    acetaminophen (Tylenol) 325 mg tablet Take 2 tablets (650 mg) by mouth every 4 hours if needed for mild pain (1 - 3). 30 tablet 0    ALPRAZolam (Xanax) 0.25 mg tablet Take 1 tablet (0.25 mg) by mouth once daily at  bedtime.      apixaban (Eliquis) 5 mg tablet Take 1 tablet (5 mg) by mouth every 12 hours. 180 tablet 3    aspirin 81 mg chewable tablet Chew and swallow 1 tablet (81 mg) once daily.      benzonatate (Tessalon) 200 mg capsule Take 1 capsule (200 mg) by mouth 3 times a day as needed for cough. Do not crush or chew. 20 capsule 0    cycloSPORINE (Restasis) 0.05 % ophthalmic emulsion Administer 1 drop into both eyes 2 times a day.      doxazosin (Cardura) 2 mg tablet Take 2 tablets (4 mg) by mouth once daily at bedtime.      ergocalciferol (Vitamin D-2) 1.25 MG (19090 UT) capsule Take 1 capsule (50,000 Units) by mouth 1 (one) time per week. Every monday      evolocumab (Repatha SureClick) 140 mg/mL injection Inject 1 mL (140 mg) under the skin every 14 (fourteen) days. 6 each 3    famotidine (Pepcid) 40 mg tablet Take 1 tablet (40 mg) by mouth once daily at bedtime.      levothyroxine (Synthroid, Levoxyl) 75 mcg tablet Take 1 tablet (75 mcg) by mouth 5 times a week. Exclude weekends      metoprolol succinate XL (Toprol-XL) 100 mg 24 hr tablet Take 1 tablet (100 mg) by mouth 2 times a day. Do not crush or chew. 180 tablet 3    multivitamin (MULTIPLE VITAMINS ORAL) Take 1 tablet by mouth once daily.      sertraline (Zoloft) 50 mg tablet Take 1 tablet (50 mg) by mouth once daily.      sodium chloride (Ocean) 0.65 % nasal spray Administer 1 spray into each nostril 4 times a day as needed for congestion. 44 mL 12     No current facility-administered medications for this visit.

## 2025-07-24 NOTE — ASSESSMENT & PLAN NOTE
12/10/24 coronary angiograms ([facility]_ mild LAD 45-50% with atretic LIMA to LAD, mid RCA  with left to rightr collaerals

## 2025-08-05 ENCOUNTER — APPOINTMENT (OUTPATIENT)
Dept: CARDIOLOGY | Facility: CLINIC | Age: 80
End: 2025-08-05
Payer: MEDICARE

## 2025-08-11 ENCOUNTER — TELEPHONE (OUTPATIENT)
Dept: CARDIOLOGY | Facility: CLINIC | Age: 80
End: 2025-08-11
Payer: MEDICARE

## 2025-08-11 DIAGNOSIS — R00.2 PALPITATIONS: ICD-10-CM

## 2025-08-11 DIAGNOSIS — R06.00 DYSPNEA, UNSPECIFIED TYPE: ICD-10-CM

## 2025-08-12 ENCOUNTER — ANCILLARY PROCEDURE (OUTPATIENT)
Dept: CARDIOLOGY | Facility: CLINIC | Age: 80
End: 2025-08-12
Payer: MEDICARE

## 2025-08-21 ENCOUNTER — OFFICE VISIT (OUTPATIENT)
Dept: CARDIOLOGY | Facility: CLINIC | Age: 80
End: 2025-08-21
Payer: MEDICARE

## 2025-08-21 VITALS
HEIGHT: 64 IN | SYSTOLIC BLOOD PRESSURE: 120 MMHG | HEART RATE: 61 BPM | OXYGEN SATURATION: 96 % | DIASTOLIC BLOOD PRESSURE: 80 MMHG | BODY MASS INDEX: 28.85 KG/M2 | WEIGHT: 169 LBS

## 2025-08-21 DIAGNOSIS — R06.09 EXERTIONAL DYSPNEA: ICD-10-CM

## 2025-08-21 DIAGNOSIS — I10 PRIMARY HYPERTENSION: ICD-10-CM

## 2025-08-21 DIAGNOSIS — R00.2 PALPITATIONS: ICD-10-CM

## 2025-08-21 DIAGNOSIS — Z95.1 S/P CABG X 4: ICD-10-CM

## 2025-08-21 DIAGNOSIS — E78.00 PURE HYPERCHOLESTEROLEMIA: ICD-10-CM

## 2025-08-21 DIAGNOSIS — I25.10 CORONARY ARTERY DISEASE INVOLVING NATIVE CORONARY ARTERY OF NATIVE HEART WITHOUT ANGINA PECTORIS: Primary | ICD-10-CM

## 2025-08-21 DIAGNOSIS — Z86.69 HISTORY OF MIGRAINE: ICD-10-CM

## 2025-08-21 DIAGNOSIS — K21.9 GASTROESOPHAGEAL REFLUX DISEASE WITHOUT ESOPHAGITIS: ICD-10-CM

## 2025-08-21 DIAGNOSIS — I48.0 PAROXYSMAL ATRIAL FIBRILLATION (MULTI): ICD-10-CM

## 2025-08-21 DIAGNOSIS — I21.4 NSTEMI (NON-ST ELEVATED MYOCARDIAL INFARCTION) (MULTI): ICD-10-CM

## 2025-08-21 LAB
ATRIAL RATE: 58 BPM
P AXIS: 4 DEGREES
P OFFSET: 186 MS
P ONSET: 123 MS
PR INTERVAL: 190 MS
Q ONSET: 218 MS
QRS COUNT: 10 BEATS
QRS DURATION: 86 MS
QT INTERVAL: 432 MS
QTC CALCULATION(BAZETT): 424 MS
QTC FREDERICIA: 426 MS
R AXIS: 15 DEGREES
T AXIS: 16 DEGREES
T OFFSET: 434 MS
VENTRICULAR RATE: 58 BPM

## 2025-08-21 PROCEDURE — 3074F SYST BP LT 130 MM HG: CPT | Performed by: INTERNAL MEDICINE

## 2025-08-21 PROCEDURE — 99212 OFFICE O/P EST SF 10 MIN: CPT

## 2025-08-21 PROCEDURE — 1036F TOBACCO NON-USER: CPT | Performed by: INTERNAL MEDICINE

## 2025-08-21 PROCEDURE — 93005 ELECTROCARDIOGRAM TRACING: CPT | Performed by: INTERNAL MEDICINE

## 2025-08-21 PROCEDURE — 3079F DIAST BP 80-89 MM HG: CPT | Performed by: INTERNAL MEDICINE

## 2025-08-21 PROCEDURE — 1159F MED LIST DOCD IN RCRD: CPT | Performed by: INTERNAL MEDICINE

## 2025-08-21 PROCEDURE — 99214 OFFICE O/P EST MOD 30 MIN: CPT | Performed by: INTERNAL MEDICINE

## 2025-08-21 PROCEDURE — 1160F RVW MEDS BY RX/DR IN RCRD: CPT | Performed by: INTERNAL MEDICINE

## 2025-09-16 ENCOUNTER — APPOINTMENT (OUTPATIENT)
Dept: CARDIOLOGY | Facility: CLINIC | Age: 80
End: 2025-09-16
Payer: MEDICARE

## (undated) DEVICE — SHEATH, PINNACLE, W/.038 GUIDEWIRE, 10 CM,  6FR INTRODUCER, 6FR DIA, 2.5 CM DIALATOR

## (undated) DEVICE — CATHETER, EXPO, MODEL-D, 6FR FR4

## (undated) DEVICE — ACCESS KIT, MINI MAK, 4FR X 10CML, 0.018 X 40CM, SS/SS, ECHO ENHANCED 7CM NDL

## (undated) DEVICE — CATHETER, DIAGNOSTIC, DXTERITY, 6 FR, JL 4.0, 100C

## (undated) DEVICE — DEPOT, BAG 1400ML, 48IN LG BORE, AIRLESS MALE, MACRO DRIP

## (undated) DEVICE — SYRINGE, CORONARY CONTROL, INJECT10N, 10ML, ROTATING ADAPTER

## (undated) DEVICE — GUIDEWIRE, INQWIRE, 3MM J, .035 X 210CM, FIXED

## (undated) DEVICE — Device

## (undated) DEVICE — KIT, MANIFOLD, PARMA

## (undated) DEVICE — ELECTRODE, MULTIFUNCTION, QUICK-COMBO, EDGE SYSTEM, 2 FT

## (undated) DEVICE — CATHETER, EXPO, MODEL-D, 6FR IM